# Patient Record
Sex: FEMALE | Race: BLACK OR AFRICAN AMERICAN | NOT HISPANIC OR LATINO | Employment: UNEMPLOYED | URBAN - METROPOLITAN AREA
[De-identification: names, ages, dates, MRNs, and addresses within clinical notes are randomized per-mention and may not be internally consistent; named-entity substitution may affect disease eponyms.]

---

## 2020-02-19 ENCOUNTER — HISTORICAL (OUTPATIENT)
Dept: ADMINISTRATIVE | Facility: HOSPITAL | Age: 25
End: 2020-02-19

## 2020-02-19 LAB
HAV IGM SERPL QL IA: NONREACTIVE
HBV CORE IGM SERPL QL IA: NONREACTIVE
HBV SURFACE AG SERPL QL IA: NONREACTIVE
HCV AB SERPL QL IA: NONREACTIVE
HIV 1+2 AB+HIV1 P24 AG SERPL QL IA: NONREACTIVE
RPR SER QL: REACTIVE
T PALLIDUM AB SER QL: REACTIVE

## 2022-01-03 ENCOUNTER — PATIENT OUTREACH (OUTPATIENT)
Dept: EMERGENCY MEDICINE | Facility: HOSPITAL | Age: 27
End: 2022-01-03

## 2022-04-10 ENCOUNTER — HISTORICAL (OUTPATIENT)
Dept: ADMINISTRATIVE | Facility: HOSPITAL | Age: 27
End: 2022-04-10
Payer: MEDICAID

## 2022-04-29 VITALS
HEIGHT: 64 IN | BODY MASS INDEX: 50.02 KG/M2 | DIASTOLIC BLOOD PRESSURE: 76 MMHG | SYSTOLIC BLOOD PRESSURE: 120 MMHG | OXYGEN SATURATION: 98 % | WEIGHT: 293 LBS
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
## 2022-05-11 ENCOUNTER — HOSPITAL ENCOUNTER (EMERGENCY)
Facility: HOSPITAL | Age: 27
Discharge: HOME | End: 2022-05-11
Attending: EMERGENCY MEDICINE
Payer: MEDICAID

## 2022-05-11 VITALS
TEMPERATURE: 99 F | WEIGHT: 293 LBS | HEART RATE: 94 BPM | RESPIRATION RATE: 20 BRPM | SYSTOLIC BLOOD PRESSURE: 116 MMHG | BODY MASS INDEX: 50.02 KG/M2 | DIASTOLIC BLOOD PRESSURE: 85 MMHG | OXYGEN SATURATION: 98 % | HEIGHT: 64 IN

## 2022-05-11 DIAGNOSIS — R10.84 GENERALIZED ABDOMINAL PAIN: Primary | ICD-10-CM

## 2022-05-11 DIAGNOSIS — R11.0 NAUSEA: ICD-10-CM

## 2022-05-11 DIAGNOSIS — N39.0 URINARY TRACT INFECTION WITHOUT HEMATURIA, SITE UNSPECIFIED: ICD-10-CM

## 2022-05-11 LAB
ALBUMIN SERPL-MCNC: 3.6 GM/DL (ref 3.5–5)
ALBUMIN/GLOB SERPL: 1 RATIO (ref 1.1–2)
ALP SERPL-CCNC: 85 UNIT/L (ref 40–150)
ALT SERPL-CCNC: 42 UNIT/L (ref 0–55)
APPEARANCE UR: CLEAR
AST SERPL-CCNC: 31 UNIT/L (ref 5–34)
B-HCG SERPL QL: NEGATIVE
BACTERIA #/AREA URNS AUTO: ABNORMAL /HPF
BASOPHILS # BLD AUTO: 0.02 X10(3)/MCL (ref 0–0.2)
BASOPHILS NFR BLD AUTO: 0.2 %
BILIRUB UR QL STRIP.AUTO: NEGATIVE MG/DL
BILIRUBIN DIRECT+TOT PNL SERPL-MCNC: 0.2 MG/DL (ref 0–0.5)
BILIRUBIN DIRECT+TOT PNL SERPL-MCNC: 0.3 MG/DL (ref 0–0.8)
BILIRUBIN DIRECT+TOT PNL SERPL-MCNC: 0.5 MG/DL
BUN SERPL-MCNC: 11.5 MG/DL (ref 7–18.7)
CALCIUM SERPL-MCNC: 9.6 MG/DL (ref 8.4–10.2)
CHLORIDE SERPL-SCNC: 105 MMOL/L (ref 98–107)
CO2 SERPL-SCNC: 27 MMOL/L (ref 22–29)
COLOR UR AUTO: YELLOW
CREAT SERPL-MCNC: 0.77 MG/DL (ref 0.55–1.02)
EOSINOPHIL # BLD AUTO: 0.13 X10(3)/MCL (ref 0–0.9)
EOSINOPHIL NFR BLD AUTO: 1.4 %
ERYTHROCYTE [DISTWIDTH] IN BLOOD BY AUTOMATED COUNT: 12.7 % (ref 11.5–17)
GLOBULIN SER-MCNC: 3.7 GM/DL (ref 2.4–3.5)
GLUCOSE SERPL-MCNC: 122 MG/DL (ref 74–100)
GLUCOSE UR QL STRIP.AUTO: NEGATIVE MG/DL
HCT VFR BLD AUTO: 44.2 % (ref 37–47)
HGB BLD-MCNC: 14.6 GM/DL (ref 12–16)
IMM GRANULOCYTES # BLD AUTO: 0.03 X10(3)/MCL (ref 0–0.02)
IMM GRANULOCYTES NFR BLD AUTO: 0.3 % (ref 0–0.43)
KETONES UR QL STRIP.AUTO: NEGATIVE MG/DL
LEUKOCYTE ESTERASE UR QL STRIP.AUTO: ABNORMAL UNIT/L
LIPASE SERPL-CCNC: 14 U/L
LYMPHOCYTES # BLD AUTO: 1.52 X10(3)/MCL (ref 0.6–4.6)
LYMPHOCYTES NFR BLD AUTO: 16.7 %
MCH RBC QN AUTO: 28.6 PG (ref 27–31)
MCHC RBC AUTO-ENTMCNC: 33 MG/DL (ref 33–36)
MCV RBC AUTO: 86.7 FL (ref 80–94)
MONOCYTES # BLD AUTO: 0.64 X10(3)/MCL (ref 0.1–1.3)
MONOCYTES NFR BLD AUTO: 7 %
NEUTROPHILS # BLD AUTO: 6.8 X10(3)/MCL (ref 2.1–9.2)
NEUTROPHILS NFR BLD AUTO: 74.4 %
NITRITE UR QL STRIP.AUTO: NEGATIVE
NRBC BLD AUTO-RTO: 0 %
PH UR STRIP.AUTO: 5.5 [PH]
PLATELET # BLD AUTO: 223 X10(3)/MCL (ref 130–400)
PMV BLD AUTO: 11.4 FL (ref 9.4–12.4)
POTASSIUM SERPL-SCNC: 4.3 MMOL/L (ref 3.5–5.1)
PROT SERPL-MCNC: 7.3 GM/DL (ref 6.4–8.3)
PROT UR QL STRIP.AUTO: NEGATIVE MG/DL
RBC # BLD AUTO: 5.1 X10(6)/MCL (ref 4.2–5.4)
RBC #/AREA URNS AUTO: ABNORMAL /HPF
RBC UR QL AUTO: NEGATIVE UNIT/L
SODIUM SERPL-SCNC: 142 MMOL/L (ref 136–145)
SP GR UR STRIP.AUTO: >=1.03
SQUAMOUS #/AREA URNS AUTO: ABNORMAL /LPF
UROBILINOGEN UR STRIP-ACNC: 0.2 MG/DL
WBC # SPEC AUTO: 9.1 X10(3)/MCL (ref 4.5–11.5)
WBC #/AREA URNS AUTO: ABNORMAL /HPF

## 2022-05-11 PROCEDURE — 96372 THER/PROPH/DIAG INJ SC/IM: CPT | Mod: 59 | Performed by: PHYSICIAN ASSISTANT

## 2022-05-11 PROCEDURE — 25000003 PHARM REV CODE 250: Performed by: PHYSICIAN ASSISTANT

## 2022-05-11 PROCEDURE — 81003 URINALYSIS AUTO W/O SCOPE: CPT | Performed by: PHYSICIAN ASSISTANT

## 2022-05-11 PROCEDURE — 85025 COMPLETE CBC W/AUTO DIFF WBC: CPT | Performed by: PHYSICIAN ASSISTANT

## 2022-05-11 PROCEDURE — 36415 COLL VENOUS BLD VENIPUNCTURE: CPT | Performed by: PHYSICIAN ASSISTANT

## 2022-05-11 PROCEDURE — 96361 HYDRATE IV INFUSION ADD-ON: CPT

## 2022-05-11 PROCEDURE — 81025 URINE PREGNANCY TEST: CPT | Performed by: PHYSICIAN ASSISTANT

## 2022-05-11 PROCEDURE — 81015 MICROSCOPIC EXAM OF URINE: CPT | Performed by: PHYSICIAN ASSISTANT

## 2022-05-11 PROCEDURE — 80053 COMPREHEN METABOLIC PANEL: CPT | Performed by: PHYSICIAN ASSISTANT

## 2022-05-11 PROCEDURE — 99284 EMERGENCY DEPT VISIT MOD MDM: CPT | Mod: 25

## 2022-05-11 PROCEDURE — 96374 THER/PROPH/DIAG INJ IV PUSH: CPT

## 2022-05-11 PROCEDURE — 63600175 PHARM REV CODE 636 W HCPCS: Performed by: PHYSICIAN ASSISTANT

## 2022-05-11 PROCEDURE — 83690 ASSAY OF LIPASE: CPT | Performed by: PHYSICIAN ASSISTANT

## 2022-05-11 RX ORDER — DICYCLOMINE HYDROCHLORIDE 20 MG/1
20 TABLET ORAL 4 TIMES DAILY
Qty: 20 TABLET | Refills: 0 | Status: SHIPPED | OUTPATIENT
Start: 2022-05-11 | End: 2022-05-16

## 2022-05-11 RX ORDER — FLUTICASONE PROPIONATE 50 MCG
SPRAY, SUSPENSION (ML) NASAL
COMMUNITY
Start: 2022-02-07 | End: 2022-06-30

## 2022-05-11 RX ORDER — DICYCLOMINE HYDROCHLORIDE 10 MG/ML
20 INJECTION INTRAMUSCULAR
Status: COMPLETED | OUTPATIENT
Start: 2022-05-11 | End: 2022-05-11

## 2022-05-11 RX ORDER — ONDANSETRON 2 MG/ML
4 INJECTION INTRAMUSCULAR; INTRAVENOUS
Status: COMPLETED | OUTPATIENT
Start: 2022-05-11 | End: 2022-05-11

## 2022-05-11 RX ORDER — NITROFURANTOIN 25; 75 MG/1; MG/1
100 CAPSULE ORAL 2 TIMES DAILY
Qty: 10 CAPSULE | Refills: 0 | Status: SHIPPED | OUTPATIENT
Start: 2022-05-11 | End: 2022-05-16

## 2022-05-11 RX ORDER — ONDANSETRON 4 MG/1
4 TABLET, ORALLY DISINTEGRATING ORAL EVERY 6 HOURS PRN
Qty: 20 TABLET | Refills: 0 | Status: SHIPPED | OUTPATIENT
Start: 2022-05-11 | End: 2022-05-16

## 2022-05-11 RX ORDER — CETIRIZINE HYDROCHLORIDE 10 MG/1
10 TABLET ORAL DAILY
COMMUNITY
Start: 2022-02-07 | End: 2022-06-30

## 2022-05-11 RX ADMIN — ONDANSETRON HYDROCHLORIDE 4 MG: 2 SOLUTION INTRAMUSCULAR; INTRAVENOUS at 12:05

## 2022-05-11 RX ADMIN — DICYCLOMINE HYDROCHLORIDE 20 MG: 20 INJECTION, SOLUTION INTRAMUSCULAR at 12:05

## 2022-05-11 RX ADMIN — SODIUM CHLORIDE 1000 ML: 9 INJECTION, SOLUTION INTRAVENOUS at 12:05

## 2022-05-11 NOTE — ED TRIAGE NOTES
Pt complains of weakness with diffuse abd pain. Onset this mor Nausea. Denies vomiting. One bout of diarrhea. Denies dysuria.

## 2022-05-11 NOTE — ED PROVIDER NOTES
Encounter Date: 5/11/2022       History     Chief Complaint   Patient presents with    Abdominal Pain    Weakness     Pt complains of weakness with diffuse abd pain. Onset this mor Nausea. Denies vomiting. One bout of diarrhea. Denies dysuria.      27 yo female presents to ED for evaluation of generalized abdominal pain and nausea since this morning. States feels like she needs to vomit but unable. Denies any urinary complaints or fever. Deneis any constipation or diarrhea. Hx of marijuana use.       Abdominal Pain  The current episode started just prior to arrival. The onset of the illness was abrupt. The abdominal pain is generalized. The abdominal pain does not radiate. The other symptoms of the illness include nausea. The other symptoms of the illness do not include fever, fatigue, shortness of breath, vomiting, diarrhea or dysuria.   Nausea began today.   The patient has not had a change in bowel habit. Additional symptoms associated with the illness include frequency. Symptoms associated with the illness do not include chills, diaphoresis, constipation, hematuria or back pain.     Review of patient's allergies indicates:  No Known Allergies  No past medical history on file.  No past surgical history on file.  No family history on file.  Social History     Tobacco Use    Smoking status: Former Smoker     Review of Systems   Constitutional: Negative for chills, diaphoresis, fatigue and fever.   Respiratory: Negative for cough and shortness of breath.    Cardiovascular: Negative for chest pain.   Gastrointestinal: Positive for abdominal pain and nausea. Negative for constipation, diarrhea and vomiting.   Genitourinary: Positive for frequency. Negative for dysuria and hematuria.   Musculoskeletal: Negative for back pain.   Neurological: Negative for dizziness, weakness, light-headedness and numbness.       Physical Exam     Initial Vitals [05/11/22 1111]   BP Pulse Resp Temp SpO2   (!) 141/93 (!) 115 18 97.7 °F  (36.5 °C) 97 %      MAP       --         Physical Exam    Nursing note and vitals reviewed.  Constitutional: She appears well-developed and well-nourished.   HENT:   Head: Normocephalic and atraumatic.   Eyes: EOM are normal. Pupils are equal, round, and reactive to light.   Neck: Neck supple.   Normal range of motion.  Cardiovascular: Normal rate, regular rhythm and normal heart sounds.   Pulmonary/Chest: Breath sounds normal. She has no wheezes. She has no rhonchi. She has no rales.   Abdominal: Abdomen is soft. Bowel sounds are normal. There is no abdominal tenderness. There is no rebound and no guarding.   Musculoskeletal:         General: Normal range of motion.      Cervical back: Normal range of motion and neck supple.     Neurological: She is alert and oriented to person, place, and time.   Skin: Skin is warm and dry.   Psychiatric: She has a normal mood and affect.         ED Course   Procedures  Labs Reviewed   COMPREHENSIVE METABOLIC PANEL - Abnormal; Notable for the following components:       Result Value    Glucose Level 122 (*)     Globulin 3.7 (*)     Albumin/Globulin Ratio 1.0 (*)     All other components within normal limits   URINALYSIS - Abnormal; Notable for the following components:    Leukocyte Esterase, UA Trace (*)     All other components within normal limits   CBC WITH DIFFERENTIAL - Abnormal; Notable for the following components:    IG# 0.03 (*)     All other components within normal limits   URINALYSIS, MICROSCOPIC - Abnormal; Notable for the following components:    Squamous Epithelial Cells, UA Many (*)     Bacteria, UA Moderate (*)     All other components within normal limits   LIPASE - Normal   PREGNANCY TEST, URINE RAPID - Normal   CBC W/ AUTO DIFFERENTIAL    Narrative:     The following orders were created for panel order CBC Auto Differential.  Procedure                               Abnormality         Status                     ---------                                -----------         ------                     CBC with Differential[[id number]]        Abnormal            Final result                 Please view results for these tests on the individual orders.          Imaging Results    None          Medications   sodium chloride 0.9% bolus 1,000 mL (1,000 mLs Intravenous New Bag 5/11/22 1245)   ondansetron injection 4 mg (4 mg Intravenous Given 5/11/22 1236)   dicyclomine injection 20 mg (20 mg Intramuscular Given 5/11/22 1238)     Medical Decision Making:   Differential Diagnosis:   Differential Diagnosis includes, but is not limited to:  AAA, aortic dissection, mesenteric ischemia, perforated viscous, MI/ACS, SBO/volvulus, incarcerated/strangulated hernia, intussusception, ileus, appendicitis, cholecystitis, cholangitis, diverticulitis, esophagitis, hepatitis, nephrolithiasis, pancreatitis, gastroenteritis, colitis, IBD/IBS, biliary colic, GERD, PUD, constipation, UTI/pyelonephritis,  disorder, cannabis hyperemesis.    ED Management:  Reassessed the pt.  The pt is resting comfortably and is NAD.  Pt states their sx have improved. Labs unremarkable. Abdomen, soft non tender and nonsurgical. Signs of UTI noted, will treat with antibiotics. Will give symptomatic care with zofran and bentyl. Discussed test results, shared treatment plan, specific conditions for return, and the need for f/u.  Answered their questions at this time.  Pt understands and agrees to the plan.  The pt has remained hemodynamically stable through ED course and is stable for discharge.                ED Course as of 05/11/22 1335   Wed May 11, 2022   1306 Bacteria, UA(!): Moderate  Patient with moderate bacteria and leukocytes in urine. Patient denies any dysuria or frequency. [SL]   1307 Patient reports pain and nausea better after zofran and bentyl. Awaiting completed lab work.  [SL]   1325 Labs unremarkable.  [SL]      ED Course User Index  [SL] ALIREZA Thomas             Clinical Impression:   Final  diagnoses:  [R10.84] Generalized abdominal pain (Primary)  [N39.0] Urinary tract infection without hematuria, site unspecified  [R11.0] Nausea          ED Disposition Condition    Discharge Stable        ED Prescriptions     Medication Sig Dispense Start Date End Date Auth. Provider    dicyclomine (BENTYL) 20 mg tablet Take 1 tablet (20 mg total) by mouth 4 (four) times daily. for 5 days 20 tablet 5/11/2022 5/16/2022 ALIREZA Thomas    ondansetron (ZOFRAN-ODT) 4 MG TbDL Take 1 tablet (4 mg total) by mouth every 6 (six) hours as needed (Nausea). 20 tablet 5/11/2022 5/16/2022 ALIREZA Thomas    nitrofurantoin, macrocrystal-monohydrate, (MACROBID) 100 MG capsule Take 1 capsule (100 mg total) by mouth 2 (two) times daily. for 5 days 10 capsule 5/11/2022 5/16/2022 ALIREZA Thomas        Follow-up Information    None          ALIREZA Thomas  05/11/22 1683

## 2022-06-02 ENCOUNTER — PATIENT OUTREACH (OUTPATIENT)
Dept: EMERGENCY MEDICINE | Facility: HOSPITAL | Age: 27
End: 2022-06-02
Payer: MEDICAID

## 2022-06-07 NOTE — PROGRESS NOTES
Patient did not return call 6/6/2022.  Attempted to reach patient 4 times without success.  Encounter closed.

## 2022-06-30 ENCOUNTER — HOSPITAL ENCOUNTER (OUTPATIENT)
Facility: HOSPITAL | Age: 27
Discharge: HOME | End: 2022-07-02
Attending: EMERGENCY MEDICINE | Admitting: STUDENT IN AN ORGANIZED HEALTH CARE EDUCATION/TRAINING PROGRAM
Payer: MEDICAID

## 2022-06-30 DIAGNOSIS — R07.9 CHEST PAIN: ICD-10-CM

## 2022-06-30 DIAGNOSIS — I47.10 SVT (SUPRAVENTRICULAR TACHYCARDIA): Primary | ICD-10-CM

## 2022-06-30 PROBLEM — E66.01 MORBID OBESITY: Status: ACTIVE | Noted: 2022-06-30

## 2022-06-30 PROBLEM — R03.0 ELEVATED BLOOD-PRESSURE READING, WITHOUT DIAGNOSIS OF HYPERTENSION: Status: ACTIVE | Noted: 2022-02-07

## 2022-06-30 PROBLEM — K64.4 HEMORRHOIDS, EXTERNAL: Status: ACTIVE | Noted: 2022-06-30

## 2022-06-30 PROBLEM — A53.9 SYPHILIS: Status: ACTIVE | Noted: 2022-06-30

## 2022-06-30 LAB
ALBUMIN SERPL-MCNC: 3.4 GM/DL (ref 3.5–5)
ALBUMIN/GLOB SERPL: 1 RATIO (ref 1.1–2)
ALP SERPL-CCNC: 91 UNIT/L (ref 40–150)
ALT SERPL-CCNC: 50 UNIT/L (ref 0–55)
AMPHET UR QL SCN: NEGATIVE
AST SERPL-CCNC: 33 UNIT/L (ref 5–34)
BARBITURATE SCN PRESENT UR: NEGATIVE
BASOPHILS # BLD AUTO: 0.04 X10(3)/MCL (ref 0–0.2)
BASOPHILS NFR BLD AUTO: 0.5 %
BENZODIAZ UR QL SCN: NEGATIVE
BILIRUBIN DIRECT+TOT PNL SERPL-MCNC: 0.3 MG/DL
BNP BLD-MCNC: 11.2 PG/ML
BUN SERPL-MCNC: 13 MG/DL (ref 7–18.7)
CALCIUM SERPL-MCNC: 10 MG/DL (ref 8.4–10.2)
CANNABINOIDS UR QL SCN: POSITIVE
CHLORIDE SERPL-SCNC: 107 MMOL/L (ref 98–107)
CO2 SERPL-SCNC: 24 MMOL/L (ref 22–29)
COCAINE UR QL SCN: NEGATIVE
CREAT SERPL-MCNC: 0.77 MG/DL (ref 0.55–1.02)
EOSINOPHIL # BLD AUTO: 0.12 X10(3)/MCL (ref 0–0.9)
EOSINOPHIL NFR BLD AUTO: 1.4 %
ERYTHROCYTE [DISTWIDTH] IN BLOOD BY AUTOMATED COUNT: 12.7 % (ref 11.5–17)
GLOBULIN SER-MCNC: 3.5 GM/DL (ref 2.4–3.5)
GLUCOSE SERPL-MCNC: 125 MG/DL (ref 74–100)
HCT VFR BLD AUTO: 42.9 % (ref 37–47)
HGB BLD-MCNC: 13.7 GM/DL (ref 12–16)
LYMPHOCYTES # BLD AUTO: 4.16 X10(3)/MCL (ref 0.6–4.6)
LYMPHOCYTES NFR BLD AUTO: 47.9 %
MCH RBC QN AUTO: 28.1 PG (ref 27–31)
MCHC RBC AUTO-ENTMCNC: 31.9 MG/DL (ref 33–36)
MCV RBC AUTO: 88.1 FL (ref 80–94)
MONOCYTES # BLD AUTO: 0.64 X10(3)/MCL (ref 0.1–1.3)
MONOCYTES NFR BLD AUTO: 7.4 %
NEUTROPHILS # BLD AUTO: 3.7 X10(3)/MCL (ref 2.1–9.2)
NEUTROPHILS NFR BLD AUTO: 42.6 %
OPIATES UR QL SCN: NEGATIVE
PCP UR QL: NEGATIVE
PH UR: 6 [PH] (ref 3–11)
PLATELET # BLD AUTO: 277 X10(3)/MCL (ref 130–400)
PMV BLD AUTO: 11.3 FL (ref 7.4–10.4)
POC CARDIAC TROPONIN I: 0 NG/ML
POC CARDIAC TROPONIN I: 0.16 NG/ML
POC CARDIAC TROPONIN I: 0.43 NG/ML
POTASSIUM SERPL-SCNC: 4.3 MMOL/L (ref 3.5–5.1)
PROT SERPL-MCNC: 6.9 GM/DL (ref 6.4–8.3)
RBC # BLD AUTO: 4.87 X10(6)/MCL (ref 4.2–5.4)
SAMPLE: ABNORMAL
SAMPLE: ABNORMAL
SAMPLE: NORMAL
SARS-COV-2 RDRP RESP QL NAA+PROBE: NEGATIVE
SODIUM SERPL-SCNC: 143 MMOL/L (ref 136–145)
SPECIFIC GRAVITY, URINE AUTO (.000) (OHS): >=1.03 (ref 1–1.03)
TROPONIN I SERPL-MCNC: 0.34 NG/ML (ref 0–0.04)
WBC # SPEC AUTO: 8.7 X10(3)/MCL (ref 4.5–11.5)

## 2022-06-30 PROCEDURE — G0378 HOSPITAL OBSERVATION PER HR: HCPCS

## 2022-06-30 PROCEDURE — 83880 ASSAY OF NATRIURETIC PEPTIDE: CPT | Performed by: EMERGENCY MEDICINE

## 2022-06-30 PROCEDURE — 99285 EMERGENCY DEPT VISIT HI MDM: CPT | Mod: 25

## 2022-06-30 PROCEDURE — 80307 DRUG TEST PRSMV CHEM ANLYZR: CPT | Performed by: STUDENT IN AN ORGANIZED HEALTH CARE EDUCATION/TRAINING PROGRAM

## 2022-06-30 PROCEDURE — 84484 ASSAY OF TROPONIN QUANT: CPT | Performed by: STUDENT IN AN ORGANIZED HEALTH CARE EDUCATION/TRAINING PROGRAM

## 2022-06-30 PROCEDURE — 36415 COLL VENOUS BLD VENIPUNCTURE: CPT | Performed by: EMERGENCY MEDICINE

## 2022-06-30 PROCEDURE — 93005 ELECTROCARDIOGRAM TRACING: CPT

## 2022-06-30 PROCEDURE — 25000003 PHARM REV CODE 250: Performed by: STUDENT IN AN ORGANIZED HEALTH CARE EDUCATION/TRAINING PROGRAM

## 2022-06-30 PROCEDURE — 87635 SARS-COV-2 COVID-19 AMP PRB: CPT | Performed by: EMERGENCY MEDICINE

## 2022-06-30 PROCEDURE — 96374 THER/PROPH/DIAG INJ IV PUSH: CPT

## 2022-06-30 PROCEDURE — 85025 COMPLETE CBC W/AUTO DIFF WBC: CPT | Performed by: EMERGENCY MEDICINE

## 2022-06-30 PROCEDURE — 63600175 PHARM REV CODE 636 W HCPCS

## 2022-06-30 PROCEDURE — 84484 ASSAY OF TROPONIN QUANT: CPT

## 2022-06-30 PROCEDURE — 25000003 PHARM REV CODE 250: Performed by: EMERGENCY MEDICINE

## 2022-06-30 PROCEDURE — 81025 URINE PREGNANCY TEST: CPT | Performed by: EMERGENCY MEDICINE

## 2022-06-30 PROCEDURE — 80053 COMPREHEN METABOLIC PANEL: CPT | Performed by: EMERGENCY MEDICINE

## 2022-06-30 PROCEDURE — 63600175 PHARM REV CODE 636 W HCPCS: Performed by: STUDENT IN AN ORGANIZED HEALTH CARE EDUCATION/TRAINING PROGRAM

## 2022-06-30 PROCEDURE — 96372 THER/PROPH/DIAG INJ SC/IM: CPT | Performed by: STUDENT IN AN ORGANIZED HEALTH CARE EDUCATION/TRAINING PROGRAM

## 2022-06-30 PROCEDURE — 36415 COLL VENOUS BLD VENIPUNCTURE: CPT | Performed by: STUDENT IN AN ORGANIZED HEALTH CARE EDUCATION/TRAINING PROGRAM

## 2022-06-30 RX ORDER — ADENOSINE 3 MG/ML
INJECTION INTRAVENOUS
Status: COMPLETED
Start: 2022-06-30 | End: 2022-06-30

## 2022-06-30 RX ORDER — TALC
9 POWDER (GRAM) TOPICAL NIGHTLY PRN
Status: DISCONTINUED | OUTPATIENT
Start: 2022-06-30 | End: 2022-07-02 | Stop reason: HOSPADM

## 2022-06-30 RX ORDER — ACETAMINOPHEN 325 MG/1
650 TABLET ORAL EVERY 8 HOURS PRN
Status: DISCONTINUED | OUTPATIENT
Start: 2022-06-30 | End: 2022-07-02 | Stop reason: HOSPADM

## 2022-06-30 RX ORDER — HYDROCODONE BITARTRATE AND ACETAMINOPHEN 5; 325 MG/1; MG/1
1 TABLET ORAL EVERY 6 HOURS PRN
Status: DISCONTINUED | OUTPATIENT
Start: 2022-06-30 | End: 2022-07-02 | Stop reason: HOSPADM

## 2022-06-30 RX ORDER — ENOXAPARIN SODIUM 100 MG/ML
40 INJECTION SUBCUTANEOUS EVERY 24 HOURS
Status: DISCONTINUED | OUTPATIENT
Start: 2022-06-30 | End: 2022-07-02 | Stop reason: HOSPADM

## 2022-06-30 RX ORDER — SODIUM CHLORIDE 0.9 % (FLUSH) 0.9 %
2 SYRINGE (ML) INJECTION EVERY 12 HOURS PRN
Status: DISCONTINUED | OUTPATIENT
Start: 2022-06-30 | End: 2022-07-02 | Stop reason: HOSPADM

## 2022-06-30 RX ORDER — ACETAMINOPHEN 325 MG/1
650 TABLET ORAL EVERY 4 HOURS PRN
Status: DISCONTINUED | OUTPATIENT
Start: 2022-06-30 | End: 2022-07-02 | Stop reason: HOSPADM

## 2022-06-30 RX ORDER — ASPIRIN 325 MG
325 TABLET ORAL
Status: COMPLETED | OUTPATIENT
Start: 2022-06-30 | End: 2022-06-30

## 2022-06-30 RX ORDER — METOPROLOL TARTRATE 25 MG/1
25 TABLET, FILM COATED ORAL 2 TIMES DAILY
Status: DISCONTINUED | OUTPATIENT
Start: 2022-06-30 | End: 2022-07-02 | Stop reason: HOSPADM

## 2022-06-30 RX ORDER — ONDANSETRON 2 MG/ML
4 INJECTION INTRAMUSCULAR; INTRAVENOUS EVERY 8 HOURS PRN
Status: DISCONTINUED | OUTPATIENT
Start: 2022-06-30 | End: 2022-07-02 | Stop reason: HOSPADM

## 2022-06-30 RX ORDER — LABETALOL HCL 20 MG/4 ML
10 SYRINGE (ML) INTRAVENOUS EVERY 4 HOURS PRN
Status: DISCONTINUED | OUTPATIENT
Start: 2022-06-30 | End: 2022-07-02 | Stop reason: HOSPADM

## 2022-06-30 RX ADMIN — ADENOSINE 6 MG: 3 INJECTION INTRAVENOUS at 08:06

## 2022-06-30 RX ADMIN — ASPIRIN 325 MG ORAL TABLET 325 MG: 325 PILL ORAL at 08:06

## 2022-06-30 RX ADMIN — METOPROLOL TARTRATE 25 MG: 25 TABLET, FILM COATED ORAL at 08:06

## 2022-06-30 RX ADMIN — ACETAMINOPHEN 650 MG: 325 TABLET ORAL at 08:06

## 2022-06-30 RX ADMIN — ENOXAPARIN SODIUM 40 MG: 100 INJECTION SUBCUTANEOUS at 05:06

## 2022-06-30 NOTE — ED NOTES
Faxed packet to [facility] called and spoke with Carmencita will talk to  And call back to let us know who will be taking consult

## 2022-06-30 NOTE — ASSESSMENT & PLAN NOTE
-resolved in the ED  -metoprolol 25 BID per cardio recs from ED  -Consult cardio in AM  -Pending echo

## 2022-06-30 NOTE — ED PROVIDER NOTES
Encounter Date: 6/30/2022       History     Chief Complaint   Patient presents with    Chest Pain     Was at work when chest pain started states had a panic attack chest pain ongoing     27 y/o began having chest pain an hour PTA while at work. She began to have a panic attack. EMS brought her in and her heart rate is 187. She denies smoking, drinking, street drugs, caffiene, and sinus meds. No prior episodes. Chest pain is anterior central 9/10. Ekg  Shows sinus tach at 187 with ST elevation in AVR and depression in I, II, AVF and V 4-6 suggestive of inferolateral subendocardial injury.        Review of patient's allergies indicates:  No Known Allergies  Past Medical History:   Diagnosis Date    Hypertension     Morbid obesity      History reviewed. No pertinent surgical history.  History reviewed. No pertinent family history.  Social History     Tobacco Use    Smoking status: Never Smoker    Smokeless tobacco: Never Used   Substance Use Topics    Alcohol use: Not Currently    Drug use: Never     Review of Systems   Constitutional: Negative for fever.   HENT: Negative for sore throat.    Respiratory: Negative for shortness of breath.    Cardiovascular: Negative for chest pain.   Gastrointestinal: Negative for nausea.   Genitourinary: Negative for dysuria.   Musculoskeletal: Negative for back pain.   Skin: Negative for rash.   Neurological: Negative for weakness.   Hematological: Does not bruise/bleed easily.       Physical Exam     Initial Vitals [06/30/22 0804]   BP Pulse Resp Temp SpO2   (!) 163/70 (!) 186 (!) 22 97.9 °F (36.6 °C) 99 %      MAP       --         Physical Exam    Nursing note and vitals reviewed.  Constitutional: She appears well-developed and well-nourished.   HENT:   Head: Normocephalic and atraumatic.   Eyes: Conjunctivae and EOM are normal. Pupils are equal, round, and reactive to light.   Neck: Neck supple.   Normal range of motion.  Cardiovascular: Regular rhythm, normal heart sounds  and intact distal pulses.   Tachycardia   Pulmonary/Chest: Breath sounds normal.   Abdominal: Abdomen is soft. Bowel sounds are normal.   Musculoskeletal:         General: Normal range of motion.      Cervical back: Normal range of motion and neck supple.     Neurological: She is alert and oriented to person, place, and time. She has normal strength.   Skin: Skin is warm and dry.   clammy   Psychiatric: She has a normal mood and affect. Her behavior is normal. Judgment and thought content normal.         ED Course   Procedures  Labs Reviewed   COMPREHENSIVE METABOLIC PANEL - Abnormal; Notable for the following components:       Result Value    Glucose Level 125 (*)     Albumin Level 3.4 (*)     Albumin/Globulin Ratio 1.0 (*)     All other components within normal limits   CBC WITH DIFFERENTIAL - Abnormal; Notable for the following components:    MCHC 31.9 (*)     MPV 11.3 (*)     All other components within normal limits   TROPONIN ISTAT - Abnormal; Notable for the following components:    POC Cardiac Troponin I 0.16 (*)     All other components within normal limits   B-TYPE NATRIURETIC PEPTIDE - Normal   TROPONIN ISTAT   CBC W/ AUTO DIFFERENTIAL    Narrative:     The following orders were created for panel order CBC auto differential.  Procedure                               Abnormality         Status                     ---------                               -----------         ------                     CBC with Differential[[id number]]        Abnormal            Final result                 Please view results for these tests on the individual orders.   PREGNANCY TEST, URINE RAPID   SARS-COV-2 RNA AMPLIFICATION, QUAL   POCT TROPONIN     EKG Readings: (Independently Interpreted)   Initial Reading: Ischemia. Rhythm: Sinus Tachycardia. Ectopy: No Ectopy. Conduction: Normal. ST Segment Elevation: AVR. ST Segment Depression: I, II, AVF, V4, V5 and V6. Clinical Impression: Supraventricular Tachycardia (SVT)   6/30/22  07:58   Other EKG Interpretations: EKG #2 (after adenosine 6 mg) 08:03  Rate 85, Normal sinus rhythm, Nonspecific ST abnormality       Imaging Results    None          Medications   adenosine (ADENOCARD) 3 mg/mL injection (6 mg Intravenous Given 6/30/22 0801)   aspirin tablet 325 mg (325 mg Oral Given 6/30/22 0824)                 ED Course as of 06/30/22 1154   Thu Jun 30, 2022 0812 The patient responded well to a single 6 mg push of adenocard and converted to sinus rhythm with resolution of her chest pain. [GA]   1034 Initial troponin was 0.00. 2 hours later 0.16. Will consult cardiology. [GA]   1144 I spoke with Dr Rubens Carson cardiology who reccomends metoprolol 25 mg BID and 24 hour observation with cardiology follow up as outpatient. [GA]      ED Course User Index  [GA] Chris Rosario MD             Clinical Impression:   Final diagnoses:  [R07.9] Chest pain  [I47.1] SVT (supraventricular tachycardia) (Primary)          ED Disposition Condition    Observation               Chris Rosario MD  06/30/22 5834

## 2022-06-30 NOTE — H&P
[facility] - Medical Surgical Unit  [facility] Medicine  History & Physical    Patient Name: Esthela Maddox  MRN: [id number]  Patient Class: OP- Observation  Admission Date: 6/30/2022  Attending Physician: No att. providers found   Primary Care Provider: Primary Doctor No         Patient information was obtained from patient and ER records.     Subjective:     Principal Problem:SVT (supraventricular tachycardia)    Chief Complaint:   Chief Complaint   Patient presents with    Chest Pain     Was at work when chest pain started states had a panic attack chest pain ongoing        HPI: 18-year-old female a past medical history as morbid obesity and marijuana use who presents with complaint of chest pain today.  Patient reports she was at work when all of a sudden had sudden-onset chest pain.  Patient reports it felt like she was having a panic attack however these symptoms were different and worse in severity.  She describes the pain as sternal, constant and did not relieved until she got medication in the ED.  A friend's to associated shortness of breath.  Denies cocaine use.  Heart rate in the ED was 187. Initial troponin was 0.00 and repeat was 0.16.  EKG done on presentation showed sinus tachycardia at a rate of 187 beats per minute with marked ST abnormalities repeat done once heart rate was well controlled showed normal sinus rhythm at 85 beats per minute with resolved ST depressions. Cardiology was consulted from the ED who recommended metoprolol 25 mg b.i.d. and 24 hour observation.  With outpatient follow-up.        Past Medical History:   Diagnosis Date    Hypertension     Morbid obesity        History reviewed. No pertinent surgical history.    Review of patient's allergies indicates:  No Known Allergies    No current facility-administered medications on file prior to encounter.     Current Outpatient Medications on File Prior to Encounter   Medication Sig    [DISCONTINUED] cetirizine (ZYRTEC) 10  MG tablet Take 10 mg by mouth once daily.    [DISCONTINUED] fluticasone propionate (FLONASE) 50 mcg/actuation nasal spray by Each Nostril route.     Family History    None       Tobacco Use    Smoking status: Never Smoker    Smokeless tobacco: Never Used   Substance and Sexual Activity    Alcohol use: Not Currently    Drug use: Never    Sexual activity: Yes     Review of Systems   Constitutional:  Negative for fatigue and fever.   HENT:  Negative for congestion, ear pain, sinus pain and sore throat.    Eyes:  Negative for pain, discharge and redness.   Respiratory:  Positive for shortness of breath. Negative for cough and wheezing.    Cardiovascular:  Positive for chest pain and palpitations. Negative for leg swelling.   Gastrointestinal:  Negative for abdominal pain, diarrhea, nausea and vomiting.   Endocrine: Negative for cold intolerance and heat intolerance.   Genitourinary:  Negative for dysuria, frequency and urgency.   Musculoskeletal:  Negative for back pain, joint swelling and neck pain.   Skin:  Negative for rash.   Neurological:  Negative for dizziness, weakness, numbness and headaches.   Hematological:  Does not bruise/bleed easily.   Objective:     Vital Signs (Most Recent):  Temp: 98.1 °F (36.7 °C) (06/30/22 1345)  Pulse: 93 (06/30/22 1345)  Resp: 18 (06/30/22 1345)  BP: 108/72 (06/30/22 1345)  SpO2: 98 % (06/30/22 1345) Vital Signs (24h Range):  Temp:  [97.9 °F (36.6 °C)-98.1 °F (36.7 °C)] 98.1 °F (36.7 °C)  Pulse:  [] 93  Resp:  [16-24] 18  SpO2:  [95 %-99 %] 98 %  BP: (101-163)/(62-87) 108/72     Weight: (!) 189 kg (416 lb 10.7 oz)  Body mass index is 71.49 kg/m².    Physical Exam  Constitutional:       General: She is not in acute distress.     Appearance: Normal appearance. She is morbidly obese.   HENT:      Mouth/Throat:      Mouth: Mucous membranes are moist.      Pharynx: Oropharynx is clear. No oropharyngeal exudate or posterior oropharyngeal erythema.   Eyes:      Extraocular  Movements: Extraocular movements intact.      Conjunctiva/sclera: Conjunctivae normal.      Pupils: Pupils are equal, round, and reactive to light.   Neck:      Thyroid: No thyromegaly.   Cardiovascular:      Rate and Rhythm: Normal rate and regular rhythm.      Heart sounds: No murmur heard.    No friction rub. No gallop.   Pulmonary:      Breath sounds: Normal breath sounds.   Abdominal:      General: Bowel sounds are normal. There is no distension.      Palpations: Abdomen is soft.      Tenderness: There is no abdominal tenderness.   Lymphadenopathy:      Cervical: No cervical adenopathy.   Skin:     General: Skin is warm.      Findings: No rash.   Neurological:      General: No focal deficit present.      Mental Status: She is oriented to person, place, and time.      Cranial Nerves: No cranial nerve deficit.   Psychiatric:         Mood and Affect: Mood is not anxious or depressed. Affect is not inappropriate.         CRANIAL NERVES     CN III, IV, VI   Pupils are equal, round, and reactive to light.     Significant Labs: All pertinent labs within the past 24 hours have been reviewed.    Significant Imaging: I have reviewed all pertinent imaging results/findings within the past 24 hours.    Assessment/Plan:     * SVT (supraventricular tachycardia)  -resolved in the ED  -metoprolol 25 BID per cardio recs from ED  -Consult cardio in AM  -Pending echo      Chest pain  -resolved, see above      Morbid obesity  Body mass index is 71.49 kg/m². Morbid obesity complicates all aspects of disease management from diagnostic modalities to treatment. Weight loss encouraged and health benefits explained to patient.     -dietary consult     Please admit observation status under my care  VTE Risk Mitigation (From admission, onward)         Ordered     enoxaparin injection 40 mg  Daily         06/30/22 1341     IP VTE HIGH RISK PATIENT  Once         06/30/22 1341                   Thairy G Reyes, DO  Department of Hospital  Medicine [facility] - Medical Surgical Unit

## 2022-06-30 NOTE — SUBJECTIVE & OBJECTIVE
Past Medical History:   Diagnosis Date    Hypertension     Morbid obesity        History reviewed. No pertinent surgical history.    Review of patient's allergies indicates:  No Known Allergies    No current facility-administered medications on file prior to encounter.     Current Outpatient Medications on File Prior to Encounter   Medication Sig    [DISCONTINUED] cetirizine (ZYRTEC) 10 MG tablet Take 10 mg by mouth once daily.    [DISCONTINUED] fluticasone propionate (FLONASE) 50 mcg/actuation nasal spray by Each Nostril route.     Family History    None       Tobacco Use    Smoking status: Never Smoker    Smokeless tobacco: Never Used   Substance and Sexual Activity    Alcohol use: Not Currently    Drug use: Never    Sexual activity: Yes     Review of Systems   Constitutional:  Negative for fatigue and fever.   HENT:  Negative for congestion, ear pain, sinus pain and sore throat.    Eyes:  Negative for pain, discharge and redness.   Respiratory:  Positive for shortness of breath. Negative for cough and wheezing.    Cardiovascular:  Positive for chest pain and palpitations. Negative for leg swelling.   Gastrointestinal:  Negative for abdominal pain, diarrhea, nausea and vomiting.   Endocrine: Negative for cold intolerance and heat intolerance.   Genitourinary:  Negative for dysuria, frequency and urgency.   Musculoskeletal:  Negative for back pain, joint swelling and neck pain.   Skin:  Negative for rash.   Neurological:  Negative for dizziness, weakness, numbness and headaches.   Hematological:  Does not bruise/bleed easily.   Objective:     Vital Signs (Most Recent):  Temp: 98.1 °F (36.7 °C) (06/30/22 1345)  Pulse: 93 (06/30/22 1345)  Resp: 18 (06/30/22 1345)  BP: 108/72 (06/30/22 1345)  SpO2: 98 % (06/30/22 1345) Vital Signs (24h Range):  Temp:  [97.9 °F (36.6 °C)-98.1 °F (36.7 °C)] 98.1 °F (36.7 °C)  Pulse:  [] 93  Resp:  [16-24] 18  SpO2:  [95 %-99 %] 98 %  BP: (101-163)/(62-87) 108/72     Weight: (!)  189 kg (416 lb 10.7 oz)  Body mass index is 71.49 kg/m².    Physical Exam  Constitutional:       General: She is not in acute distress.     Appearance: Normal appearance. She is morbidly obese.   HENT:      Mouth/Throat:      Mouth: Mucous membranes are moist.      Pharynx: Oropharynx is clear. No oropharyngeal exudate or posterior oropharyngeal erythema.   Eyes:      Extraocular Movements: Extraocular movements intact.      Conjunctiva/sclera: Conjunctivae normal.      Pupils: Pupils are equal, round, and reactive to light.   Neck:      Thyroid: No thyromegaly.   Cardiovascular:      Rate and Rhythm: Normal rate and regular rhythm.      Heart sounds: No murmur heard.    No friction rub. No gallop.   Pulmonary:      Breath sounds: Normal breath sounds.   Abdominal:      General: Bowel sounds are normal. There is no distension.      Palpations: Abdomen is soft.      Tenderness: There is no abdominal tenderness.   Lymphadenopathy:      Cervical: No cervical adenopathy.   Skin:     General: Skin is warm.      Findings: No rash.   Neurological:      General: No focal deficit present.      Mental Status: She is oriented to person, place, and time.      Cranial Nerves: No cranial nerve deficit.   Psychiatric:         Mood and Affect: Mood is not anxious or depressed. Affect is not inappropriate.         CRANIAL NERVES     CN III, IV, VI   Pupils are equal, round, and reactive to light.     Significant Labs: All pertinent labs within the past 24 hours have been reviewed.    Significant Imaging: I have reviewed all pertinent imaging results/findings within the past 24 hours.

## 2022-06-30 NOTE — LETTER
July 1, 2022         [address] [place] [address]  [place] [address]  Phone: [phone]       Patient: Esthela Maddox   YOB: 1995  Date of Visit: 07/01/2022    To Whom It May Concern:    Satish Maddox  was at [facility] from 6/29/2022- 7/2/2022. The patient may return to work/school on 7/2/2022. If you have any questions or concerns, or if I can be of further assistance, please do not hesitate to contact me.    Sincerely,          Thairy G Reyes, DO

## 2022-06-30 NOTE — LETTER
July 1, 2022         [address] [place] [address]  [place] [address]  Phone: [phone]       Patient: Esthela Maddox   YOB: 1995  Date of Visit: 07/01/2022    To Whom It May Concern:    Satish Maddox  was at [facility] on 07/01/2022. The patient may return to work/school on *** {With/no:47409} restrictions. If you have any questions or concerns, or if I can be of further assistance, please do not hesitate to contact me.    Sincerely,    Rubio Tam RN

## 2022-06-30 NOTE — ASSESSMENT & PLAN NOTE
Body mass index is 71.49 kg/m². Morbid obesity complicates all aspects of disease management from diagnostic modalities to treatment. Weight loss encouraged and health benefits explained to patient.     -dietary consult

## 2022-06-30 NOTE — PLAN OF CARE
[facility] - Medical Surgical Unit  Initial Discharge Assessment       Primary Care Provider: Primary Doctor No    Admission Diagnosis: SVT (supraventricular tachycardia) [I47.1]  Chest pain [R07.9]    Admission Date: 6/30/2022  Expected Discharge Date:     Discharge Barriers Identified: None    Payor: MEDICAID / Plan: [place] [employer] / Product Type: Managed Medicaid /     Extended Emergency Contact Information  Primary Emergency Contact: Nevin Campbell  Mobile Phone: [phone]  Relation: Friend  Preferred language: English   needed? No    Discharge Plan A: Home with family, Home Health         [id number] - [place], [place] - [address] AT [address]  [address]  [facility] [address]  Phone: [phone] Fax: [phone]      Initial Assessment (most recent)     Adult Discharge Assessment - 06/30/22 1803        Discharge Assessment    Assessment Type Discharge Planning Assessment     Confirmed/corrected address, phone number and insurance Yes     Confirmed Demographics Correct on Facesheet     Source of Information family     If unable to respond/provide information was family/caregiver contacted? Yes     Contact Name/Number Nevin Randle friend      Does patient/caregiver understand observation status Yes     Communicated NOEMI with patient/caregiver Date not available/Unable to determine     Reason For Admission Chest Pain     Lives With alone     Facility Arrived From: Home     Do you expect to return to your current living situation? Yes     Do you have help at home or someone to help you manage your care at home? Yes     Who are your caregiver(s) and their phone number(s)? Nevin Randle friend      Prior to hospitilization cognitive status: Alert/Oriented     Current cognitive status: Alert/Oriented     Walking or Climbing Stairs Difficulty none     Dressing/Bathing Difficulty none     Home Accessibility  wheelchair accessible     Home Layout Able to live on 1st floor     Equipment Currently Used at Home none     Readmission within 30 days? No     Patient currently being followed by outpatient case management? No     Do you currently have service(s) that help you manage your care at home? No     Do you take prescription medications? Yes     Do you have prescription coverage? Yes     Do you have any problems affording any of your prescribed medications? TBD     Is the patient taking medications as prescribed? yes     Who is going to help you get home at discharge? Nevin Randle friend      How do you get to doctors appointments? family or friend will provide     Are you on dialysis? No     Do you take coumadin? No     Discharge Plan A Home with family;Home Health     DME Needed Upon Discharge  none     Discharge Plan discussed with: Friend     Name(s) and Number(s) Nevin Randle friend      Discharge Barriers Identified None

## 2022-06-30 NOTE — ED NOTES
Patient reports she was at work, started with a panic attack, and started having chest pain. Denies drug use, caffeine intake, or medication use. Reports she has never had an episode like this.

## 2022-06-30 NOTE — HPI
18-year-old female a past medical history as morbid obesity and marijuana use who presents with complaint of chest pain today.  Patient reports she was at work when all of a sudden had sudden-onset chest pain.  Patient reports it felt like she was having a panic attack however these symptoms were different and worse in severity.  She describes the pain as sternal, constant and did not relieved until she got medication in the ED.  A friend's to associated shortness of breath.  Denies cocaine use.  Heart rate in the ED was 187. Initial troponin was 0.00 and repeat was 0.16.  EKG done on presentation showed sinus tachycardia at a rate of 187 beats per minute with marked ST abnormalities repeat done once heart rate was well controlled showed normal sinus rhythm at 85 beats per minute with resolved ST depressions. Cardiology was consulted from the ED who recommended metoprolol 25 mg b.i.d. and 24 hour observation.  With outpatient follow-up.

## 2022-06-30 NOTE — LETTER
July 2, 2022         [address] [place] [address]  [place] [address]  Phone: [phone]       Patient: Esthela Maddox   YOB: 1995  Date of Visit: 07/02/2022    To Whom It May Concern:    Satish Maddox  was at [facility] on 07/02/2022. The patient may return to work/school on *** {With/no:32667} restrictions. If you have any questions or concerns, or if I can be of further assistance, please do not hesitate to contact me.    Sincerely,    Angela Pinto LPN

## 2022-07-01 LAB
ANION GAP SERPL CALC-SCNC: 7 MEQ/L
B-HCG SERPL QL: NEGATIVE
BASOPHILS # BLD AUTO: 0.03 X10(3)/MCL (ref 0–0.2)
BASOPHILS NFR BLD AUTO: 0.4 %
BUN SERPL-MCNC: 10.3 MG/DL (ref 7–18.7)
CALCIUM SERPL-MCNC: 9.5 MG/DL (ref 8.4–10.2)
CHLORIDE SERPL-SCNC: 107 MMOL/L (ref 98–107)
CHOLEST SERPL-MCNC: 161 MG/DL
CHOLEST/HDLC SERPL: 4 {RATIO} (ref 0–5)
CO2 SERPL-SCNC: 26 MMOL/L (ref 22–29)
CREAT SERPL-MCNC: 0.68 MG/DL (ref 0.55–1.02)
CREAT/UREA NIT SERPL: 15
EOSINOPHIL # BLD AUTO: 0.14 X10(3)/MCL (ref 0–0.9)
EOSINOPHIL NFR BLD AUTO: 2 %
ERYTHROCYTE [DISTWIDTH] IN BLOOD BY AUTOMATED COUNT: 13 % (ref 11.5–17)
EST. AVERAGE GLUCOSE BLD GHB EST-MCNC: 119.8 MG/DL
GLUCOSE SERPL-MCNC: 113 MG/DL (ref 74–100)
HBA1C MFR BLD: 5.8 %
HCT VFR BLD AUTO: 39.7 % (ref 37–47)
HDLC SERPL-MCNC: 41 MG/DL (ref 35–60)
HGB BLD-MCNC: 12.5 GM/DL (ref 12–16)
IMM GRANULOCYTES # BLD AUTO: 0.01 X10(3)/MCL (ref 0–0.04)
IMM GRANULOCYTES NFR BLD AUTO: 0.1 %
LDLC SERPL CALC-MCNC: 95 MG/DL (ref 50–140)
LYMPHOCYTES # BLD AUTO: 3.27 X10(3)/MCL (ref 0.6–4.6)
LYMPHOCYTES NFR BLD AUTO: 46.6 %
MAGNESIUM SERPL-MCNC: 2.1 MG/DL (ref 1.6–2.6)
MCH RBC QN AUTO: 28.2 PG (ref 27–31)
MCHC RBC AUTO-ENTMCNC: 31.5 MG/DL (ref 33–36)
MCV RBC AUTO: 89.6 FL (ref 80–94)
MONOCYTES # BLD AUTO: 0.62 X10(3)/MCL (ref 0.1–1.3)
MONOCYTES NFR BLD AUTO: 8.8 %
NEUTROPHILS # BLD AUTO: 2.9 X10(3)/MCL (ref 2.1–9.2)
NEUTROPHILS NFR BLD AUTO: 42.1 %
PLATELET # BLD AUTO: 263 X10(3)/MCL (ref 130–400)
PMV BLD AUTO: 11.3 FL (ref 7.4–10.4)
POTASSIUM SERPL-SCNC: 3.6 MMOL/L (ref 3.5–5.1)
RBC # BLD AUTO: 4.43 X10(6)/MCL (ref 4.2–5.4)
SODIUM SERPL-SCNC: 140 MMOL/L (ref 136–145)
TRIGL SERPL-MCNC: 123 MG/DL (ref 37–140)
TSH SERPL-ACNC: 0.77 UIU/ML (ref 0.35–4.94)
VLDLC SERPL CALC-MCNC: 25 MG/DL
WBC # SPEC AUTO: 7 X10(3)/MCL (ref 4.5–11.5)

## 2022-07-01 PROCEDURE — 36415 COLL VENOUS BLD VENIPUNCTURE: CPT | Performed by: STUDENT IN AN ORGANIZED HEALTH CARE EDUCATION/TRAINING PROGRAM

## 2022-07-01 PROCEDURE — 96372 THER/PROPH/DIAG INJ SC/IM: CPT | Performed by: STUDENT IN AN ORGANIZED HEALTH CARE EDUCATION/TRAINING PROGRAM

## 2022-07-01 PROCEDURE — 25000003 PHARM REV CODE 250: Performed by: STUDENT IN AN ORGANIZED HEALTH CARE EDUCATION/TRAINING PROGRAM

## 2022-07-01 PROCEDURE — 63600175 PHARM REV CODE 636 W HCPCS: Performed by: STUDENT IN AN ORGANIZED HEALTH CARE EDUCATION/TRAINING PROGRAM

## 2022-07-01 PROCEDURE — G0378 HOSPITAL OBSERVATION PER HR: HCPCS

## 2022-07-01 PROCEDURE — 80048 BASIC METABOLIC PNL TOTAL CA: CPT | Performed by: STUDENT IN AN ORGANIZED HEALTH CARE EDUCATION/TRAINING PROGRAM

## 2022-07-01 PROCEDURE — 36415 COLL VENOUS BLD VENIPUNCTURE: CPT | Performed by: NURSE PRACTITIONER

## 2022-07-01 PROCEDURE — 83036 HEMOGLOBIN GLYCOSYLATED A1C: CPT | Performed by: STUDENT IN AN ORGANIZED HEALTH CARE EDUCATION/TRAINING PROGRAM

## 2022-07-01 PROCEDURE — 83735 ASSAY OF MAGNESIUM: CPT | Performed by: NURSE PRACTITIONER

## 2022-07-01 PROCEDURE — 84443 ASSAY THYROID STIM HORMONE: CPT | Performed by: NURSE PRACTITIONER

## 2022-07-01 PROCEDURE — 85025 COMPLETE CBC W/AUTO DIFF WBC: CPT | Performed by: STUDENT IN AN ORGANIZED HEALTH CARE EDUCATION/TRAINING PROGRAM

## 2022-07-01 PROCEDURE — 80061 LIPID PANEL: CPT | Performed by: STUDENT IN AN ORGANIZED HEALTH CARE EDUCATION/TRAINING PROGRAM

## 2022-07-01 PROCEDURE — 25000003 PHARM REV CODE 250: Performed by: NURSE PRACTITIONER

## 2022-07-01 RX ORDER — METOPROLOL TARTRATE 25 MG/1
25 TABLET, FILM COATED ORAL 2 TIMES DAILY
Qty: 60 TABLET | Refills: 11 | Status: SHIPPED | OUTPATIENT
Start: 2022-07-01 | End: 2022-08-01 | Stop reason: SDUPTHER

## 2022-07-01 RX ORDER — POTASSIUM CHLORIDE 20 MEQ/1
40 TABLET, EXTENDED RELEASE ORAL ONCE
Status: DISCONTINUED | OUTPATIENT
Start: 2022-07-01 | End: 2022-07-01

## 2022-07-01 RX ORDER — ASPIRIN 81 MG/1
81 TABLET ORAL DAILY
Status: DISCONTINUED | OUTPATIENT
Start: 2022-07-01 | End: 2022-07-02 | Stop reason: HOSPADM

## 2022-07-01 RX ORDER — ASPIRIN 81 MG/1
81 TABLET ORAL DAILY
Qty: 30 TABLET | Refills: 3 | Status: SHIPPED | OUTPATIENT
Start: 2022-07-02 | End: 2023-02-02 | Stop reason: SDUPTHER

## 2022-07-01 RX ADMIN — ENOXAPARIN SODIUM 40 MG: 100 INJECTION SUBCUTANEOUS at 04:07

## 2022-07-01 RX ADMIN — ASPIRIN 81 MG: 81 TABLET, COATED ORAL at 10:07

## 2022-07-01 RX ADMIN — POTASSIUM BICARBONATE 25 MEQ: 977.5 TABLET, EFFERVESCENT ORAL at 09:07

## 2022-07-01 RX ADMIN — POTASSIUM BICARBONATE 25 MEQ: 977.5 TABLET, EFFERVESCENT ORAL at 02:07

## 2022-07-01 RX ADMIN — METOPROLOL TARTRATE 25 MG: 25 TABLET, FILM COATED ORAL at 09:07

## 2022-07-01 RX ADMIN — METOPROLOL TARTRATE 25 MG: 25 TABLET, FILM COATED ORAL at 08:07

## 2022-07-01 NOTE — HOSPITAL COURSE
In supraventricular tachycardia broke in the ED after 6 mg IV push of adenosine, she has maintained heart rate in the 80s with metoprolol tartrate 25 p.o. b.i.d., no further recurrence.  Troponin did uptrend to a peak of 0.4 that has now started to decrease to 0.345.  A1c 5.8, TSH 0.76, lipid panel all within normal limits-->assessed for risk stratification. Echocardiogram Echocardiogram performed for further risk stratification, preliminary read is showing normal systolic function, preserved ejection fraction and normal diastolic function.  Aortic valve appears structurally normal, pulmonic valve also structurally normal.  Pericardium is normal and no pericardial effusion.  We have already scheduled patient her follow-up appointments with cardiology for preventive monitor and stress test.  Patient has been educated that she is to adhere to those follow-up appointments. In the meantime continue with aspirin, metoprolol.

## 2022-07-01 NOTE — PLAN OF CARE
Problem: Adult Inpatient Plan of Care  Goal: Plan of Care Review  Outcome: Ongoing, Progressing  Goal: Patient-Specific Goal (Individualized)  Outcome: Ongoing, Progressing  Goal: Absence of Hospital-Acquired Illness or Injury  Outcome: Ongoing, Progressing  Intervention: Identify and Manage Fall Risk  Flowsheets (Taken 7/1/2022 1721)  Safety Promotion/Fall Prevention:   assistive device/personal item within reach   bed alarm set   instructed to call staff for mobility   nonskid shoes/socks when out of bed  Intervention: Prevent and Manage VTE (Venous Thromboembolism) Risk  Flowsheets (Taken 7/1/2022 1721)  Activity Management: Ambulated to bathroom - L4  VTE Prevention/Management: bleeding risk assessed  Intervention: Prevent Infection  Flowsheets (Taken 7/1/2022 1721)  Infection Prevention:   hand hygiene promoted   personal protective equipment utilized  Goal: Optimal Comfort and Wellbeing  Outcome: Ongoing, Progressing  Intervention: Monitor Pain and Promote Comfort  Flowsheets (Taken 7/1/2022 1721)  Pain Management Interventions: quiet environment facilitated  Intervention: Provide Person-Centered Care  Flowsheets (Taken 7/1/2022 1721)  Trust Relationship/Rapport:   care explained   choices provided   questions answered   thoughts/feelings acknowledged   reassurance provided   questions encouraged  Goal: Readiness for Transition of Care  Outcome: Ongoing, Progressing     Problem: Bariatric Environmental Safety  Goal: Safety Maintained with Care  Outcome: Ongoing, Progressing     Problem: Fall Injury Risk  Goal: Absence of Fall and Fall-Related Injury  Outcome: Ongoing, Progressing  Intervention: Identify and Manage Contributors  Flowsheets (Taken 7/1/2022 1721)  Self-Care Promotion: independence encouraged  Medication Review/Management: medications reviewed     Problem: Cardiac Output Decreased  Goal: Effective Cardiac Output  Outcome: Ongoing, Progressing  Intervention: Optimize Cardiac Output  Flowsheets  (Taken 7/1/2022 1721)  VTE Prevention/Management: bleeding risk assessed  Environmental Support: calm environment promoted

## 2022-07-01 NOTE — CONSULTS
" [facility]  Adult Nutrition  Consult Note    SUMMARY     Recommendations    Recommendation/Intervention: 1. continue heart healthy diet  Goals: Consumed more fruits and vegetables, whole grains with meal. Wt loss 1-2# per week.  Nutrition Goal Status: new    Assessment and Plan    No new Assessment & Plan notes have been filed under this hospital service since the last note was generated.  Service: Nutrition       Malnutrition Assessment                                       Reason for Assessment    Reason For Assessment: consult  Diagnosis: other (see comments) (Noted   SVT (supraventricular tachycardia) 6/30/2022    Morbid obesity 6/30/2022    Chest pain)  Relevant Medical History: HTN, morbid obesity  General Information Comments: Pt intake is good. Pt felt hungry during evening. Consult for wt loss and calorie count. Educated patient on heart healthy diet, choosing healthier food options, limiting fast food, calorie counting ([employer] pal) as journal, caloric needs to lose weight. Verbalized understanding. Pt reports does not drink soda. Sprite at bedside table. Expect fair compliance. Pt did set up my fitness pal on her phone. Educational materials in discharged folder.    Nutrition Risk Screen    Nutrition Risk Screen: no indicators present    Nutrition/Diet History         Anthropometrics    Temp: 97.6 °F (36.4 °C)  Height Method: Stated  Height: 5' 4.02" (162.6 cm)  Height (inches): 64.02 in  Weight Method: Bed Scale  Weight: (!) 189 kg (416 lb 10.7 oz)  Weight (lb): (!) 416.67 lb  Ideal Body Weight (IBW), Female: 120.1 lb  % Ideal Body Weight, Female (lb): 346.94 %  BMI (Calculated): 71.5  BMI Grade: greater than 40 - morbid obesity       Lab/Procedures/Meds    Pertinent Labs Reviewed: reviewed  Pertinent Labs Comments: 7/1/22: Glucose 113 H  Pertinent Medications Reviewed: reviewed    Physical Findings/Assessment         Estimated/Assessed Needs    Weight Used For Calorie " Calculations: (!) 189 kg (416 lb 10.7 oz)  Energy Calorie Requirements (kcal): 2105.32 (MSJ X 1.0 stress factor,-500 kcal )  Energy Need Method: [place]  Protein Requirements: 151.52 (0.8 gm/kg/CBW)  Weight Used For Protein Calculations: (!) 189 kg (416 lb 10.7 oz)     Estimated Fluid Requirement Method: RDA Method  RDA Method (mL): 2105.32         Nutrition Prescription Ordered    Current Diet Order: heart healthy    Evaluation of Received Nutrient/Fluid Intake    Tolerance: tolerating      Nutrition Risk    Level of Risk/Frequency of Follow-up: low       Monitor and Evaluation    Food and Nutrient Intake: food and beverage intake  Food and Nutrient Adminstration: diet order  Knowledge/Beliefs/Attitudes: food and nutrition knowledge/skill  Anthropometric Measurements: weight  Biochemical Data, Medical Tests and Procedures: electrolyte and renal panel       Nutrition Follow-Up    RD Follow-up?: Yes

## 2022-07-01 NOTE — ASSESSMENT & PLAN NOTE
Body mass index is 71.48 kg/m². Morbid obesity complicates all aspects of disease management from diagnostic modalities to treatment. Weight loss encouraged and health benefits explained to patient.     -dietary consult

## 2022-07-01 NOTE — PLAN OF CARE
Problem: Adult Inpatient Plan of Care  Goal: Plan of Care Review  7/1/2022 0455 by Nancie Corral RN  Outcome: Ongoing, Progressing  Flowsheets (Taken 7/1/2022 0455)  Plan of Care Reviewed With: patient  7/1/2022 0452 by Nancie Corral RN  Outcome: Ongoing, Progressing  Flowsheets (Taken 7/1/2022 0452)  Plan of Care Reviewed With: patient  Goal: Absence of Hospital-Acquired Illness or Injury  7/1/2022 0455 by Nancie Corral RN  Outcome: Ongoing, Progressing  7/1/2022 0452 by Nancie Corral RN  Outcome: Ongoing, Progressing  Intervention: Identify and Manage Fall Risk  7/1/2022 0455 by LEANA Bravoheets (Taken 7/1/2022 0455)  Safety Promotion/Fall Prevention:   assistive device/personal item within reach   bed alarm set   nonskid shoes/socks when out of bed   Fall Risk reviewed with patient/family   side rails raised x 3   supervised activity   Supervised toileting - stay within arms reach   instructed to call staff for mobility  7/1/2022 0452 by Nancie Corral RN  Flowsheets (Taken 7/1/2022 0452)  Safety Promotion/Fall Prevention:   assistive device/personal item within reach   bed alarm set   Fall Risk reviewed with patient/family   nonskid shoes/socks when out of bed   side rails raised x 3   supervised activity   instructed to call staff for mobility   Supervised toileting - stay within arms reach  Intervention: Prevent Skin Injury  7/1/2022 0455 by LEANA Bravoheets (Taken 7/1/2022 0455)  Body Position: position changed independently  7/1/2022 0452 by LEANA Bravo (Taken 7/1/2022 0452)  Body Position: position changed independently  Intervention: Prevent and Manage VTE (Venous Thromboembolism) Risk  7/1/2022 0455 by LEANA Bravoheets (Taken 7/1/2022 0455)  Activity Management: Ambulated -L4  VTE Prevention/Management: ambulation promoted  7/1/2022 0452 by LEANA Bravo (Taken 7/1/2022 0452)  Activity Management: Ambulated -L4  VTE  Prevention/Management: ambulation promoted  Intervention: Prevent Infection  7/1/2022 0455 by Nancie Corral RN  Flowsheets (Taken 7/1/2022 0455)  Infection Prevention: hand hygiene promoted  7/1/2022 0452 by Nancie Corral RN  Flowsheets (Taken 7/1/2022 0452)  Infection Prevention: hand hygiene promoted

## 2022-07-01 NOTE — PROGRESS NOTES
[facility]  [facility] Medicine  Progress Note    Patient Name: Esthela Maddox  MRN: [id number]  Patient Class: OP- Observation   Admission Date: 6/30/2022  Length of Stay: 0 days  Attending Physician: No att. providers found  Primary Care Provider: Primary Doctor No        Subjective:     Principal Problem:SVT (supraventricular tachycardia)        HPI:  18-year-old female a past medical history as morbid obesity and marijuana use who presents with complaint of chest pain today.  Patient reports she was at work when all of a sudden had sudden-onset chest pain.  Patient reports it felt like she was having a panic attack however these symptoms were different and worse in severity.  She describes the pain as sternal, constant and did not relieved until she got medication in the ED.  A friend's to associated shortness of breath.  Denies cocaine use.  Heart rate in the ED was 187. Initial troponin was 0.00 and repeat was 0.16.  EKG done on presentation showed sinus tachycardia at a rate of 187 beats per minute with marked ST abnormalities repeat done once heart rate was well controlled showed normal sinus rhythm at 85 beats per minute with resolved ST depressions. Cardiology was consulted from the ED who recommended metoprolol 25 mg b.i.d. and 24 hour observation.  With outpatient follow-up.        Overview/Hospital Course:  In supraventricular tachycardia broke in the ED after 6 mg IV push of adenosine, she has maintained heart rate in the 80s with metoprolol tartrate 25 p.o. b.i.d., no further recurrence.  Troponin did uptrend to a peak of 0.4 that has now started to decrease to 0.345.  A1c 5.8, TSH 0.76, lipid panel all within normal limits an order for risk stratification.  Pending echocardiogram for further recommendations regarding ischemic workup.  In the meantime continue with aspirin, metoprolol.      Interval History:  Seen and evaluated lying comfortably in bed.  Eyes any  further chest pain or palpitations    Review of Systems   Constitutional:  Negative for fatigue and fever.   HENT:  Negative for congestion, sore throat and tinnitus.    Respiratory:  Negative for chest tightness, shortness of breath and wheezing.    Cardiovascular:  Negative for chest pain and leg swelling.   Gastrointestinal:  Negative for diarrhea and rectal pain.   Endocrine: Negative for cold intolerance and heat intolerance.   Genitourinary:  Negative for dysuria and urgency.   Musculoskeletal:  Negative for back pain.   Skin:  Negative for wound.   Neurological:  Negative for dizziness, syncope and weakness.   Hematological:  Does not bruise/bleed easily.   Psychiatric/Behavioral:  Negative for agitation. The patient is not nervous/anxious.    Objective:     Vital Signs (Most Recent):  Temp: 97.8 °F (36.6 °C) (07/01/22 1136)  Pulse: 72 (07/01/22 1136)  Resp: 20 (07/01/22 0345)  BP: 116/68 (07/01/22 1136)  SpO2: 98 % (07/01/22 1136) Vital Signs (24h Range):  Temp:  [97.6 °F (36.4 °C)-98.4 °F (36.9 °C)] 97.8 °F (36.6 °C)  Pulse:  [72-98] 72  Resp:  [18-20] 20  SpO2:  [96 %-98 %] 98 %  BP: (116-158)/(68-95) 116/68     Weight: (!) 189 kg (416 lb 10.7 oz)  Body mass index is 71.48 kg/m².    Intake/Output Summary (Last 24 hours) at 7/1/2022 1415  Last data filed at 7/1/2022 1200  Gross per 24 hour   Intake 1080 ml   Output 2 ml   Net 1078 ml      Physical Exam  Constitutional:       General: She is not in acute distress.     Appearance: She is morbidly obese.   HENT:      Head: Normocephalic and atraumatic.      Nose: No congestion.   Cardiovascular:      Rate and Rhythm: Normal rate and regular rhythm.      Heart sounds: No murmur heard.  Pulmonary:      Effort: Pulmonary effort is normal.      Breath sounds: Normal breath sounds.   Abdominal:      General: Bowel sounds are normal. There is no distension.      Palpations: Abdomen is soft. There is no mass.      Tenderness: There is no abdominal tenderness.    Musculoskeletal:         General: Normal range of motion.   Skin:     General: Skin is warm.      Findings: No lesion or rash.   Neurological:      General: No focal deficit present.      Cranial Nerves: No cranial nerve deficit.   Psychiatric:         Mood and Affect: Mood normal.         Behavior: Behavior normal.       Significant Labs: All pertinent labs within the past 24 hours have been reviewed.    Significant Imaging: I have reviewed all pertinent imaging results/findings within the past 24 hours.      Assessment/Plan:      * SVT (supraventricular tachycardia)  -resolved in the ED w/adenosine  -metoprolol 25 BID per cardio recs from ED  -Pending echo  -If wall motion intact, plan outpatient Nuclear Exercise Treadmill Stress Test & follow up with [facility] in [place] for Results.  Plan 2-Week Preventice (MCT) Monitor at discharge Re: SVT (To be fit in clinic)  Recommend Outpatient Sleep Study         Chest pain  -resolved, see above      Morbid obesity  Body mass index is 71.48 kg/m². Morbid obesity complicates all aspects of disease management from diagnostic modalities to treatment. Weight loss encouraged and health benefits explained to patient.     -dietary consult         VTE Risk Mitigation (From admission, onward)         Ordered     enoxaparin injection 40 mg  Daily         06/30/22 1341     IP VTE HIGH RISK PATIENT  Once         06/30/22 1341                Discharge Planning   NOEMI:      Code Status: Full Code   Is the patient medically ready for discharge?:     Reason for patient still in hospital (select all that apply): Patient trending condition, Laboratory test, Treatment and Consult recommendations  Discharge Plan A: Home with family, Home Health                  Thairy G Reyes, DO  Department of Hospital Medicine   [facility]

## 2022-07-01 NOTE — SUBJECTIVE & OBJECTIVE
Interval History:  Seen and evaluated lying comfortably in bed.  Eyes any further chest pain or palpitations    Review of Systems   Constitutional:  Negative for fatigue and fever.   HENT:  Negative for congestion, sore throat and tinnitus.    Respiratory:  Negative for chest tightness, shortness of breath and wheezing.    Cardiovascular:  Negative for chest pain and leg swelling.   Gastrointestinal:  Negative for diarrhea and rectal pain.   Endocrine: Negative for cold intolerance and heat intolerance.   Genitourinary:  Negative for dysuria and urgency.   Musculoskeletal:  Negative for back pain.   Skin:  Negative for wound.   Neurological:  Negative for dizziness, syncope and weakness.   Hematological:  Does not bruise/bleed easily.   Psychiatric/Behavioral:  Negative for agitation. The patient is not nervous/anxious.    Objective:     Vital Signs (Most Recent):  Temp: 97.8 °F (36.6 °C) (07/01/22 1136)  Pulse: 72 (07/01/22 1136)  Resp: 20 (07/01/22 0345)  BP: 116/68 (07/01/22 1136)  SpO2: 98 % (07/01/22 1136) Vital Signs (24h Range):  Temp:  [97.6 °F (36.4 °C)-98.4 °F (36.9 °C)] 97.8 °F (36.6 °C)  Pulse:  [72-98] 72  Resp:  [18-20] 20  SpO2:  [96 %-98 %] 98 %  BP: (116-158)/(68-95) 116/68     Weight: (!) 189 kg (416 lb 10.7 oz)  Body mass index is 71.48 kg/m².    Intake/Output Summary (Last 24 hours) at 7/1/2022 1415  Last data filed at 7/1/2022 1200  Gross per 24 hour   Intake 1080 ml   Output 2 ml   Net 1078 ml      Physical Exam  Constitutional:       General: She is not in acute distress.     Appearance: She is morbidly obese.   HENT:      Head: Normocephalic and atraumatic.      Nose: No congestion.   Cardiovascular:      Rate and Rhythm: Normal rate and regular rhythm.      Heart sounds: No murmur heard.  Pulmonary:      Effort: Pulmonary effort is normal.      Breath sounds: Normal breath sounds.   Abdominal:      General: Bowel sounds are normal. There is no distension.      Palpations: Abdomen is soft.  There is no mass.      Tenderness: There is no abdominal tenderness.   Musculoskeletal:         General: Normal range of motion.   Skin:     General: Skin is warm.      Findings: No lesion or rash.   Neurological:      General: No focal deficit present.      Cranial Nerves: No cranial nerve deficit.   Psychiatric:         Mood and Affect: Mood normal.         Behavior: Behavior normal.       Significant Labs: All pertinent labs within the past 24 hours have been reviewed.    Significant Imaging: I have reviewed all pertinent imaging results/findings within the past 24 hours.

## 2022-07-01 NOTE — CONSULTS
Inpatient consult to Cardiology  Consult performed by: GILDARDO Ross  Consult ordered by: Thairy G Reyes, DO  Reason for consult: SVT         [facility]  Cardiology  Consult Note    Patient Name: Esthela Maddox  MRN: [id number]  Admission Date: 6/30/2022  Hospital Length of Stay: 0 days  Code Status: Full Code   Attending Provider: No att. providers found   Consulting Provider: GILDARDO Ross  Primary Care Physician: Primary Doctor No  Principal Problem:SVT (supraventricular tachycardia)    Patient information was obtained from patient and ER records.     Subjective:     Chief Complaint: Consultation Reason: SVT     HPI:  Ms. Maddox is a 26 year old female, unknown to [facility], who presented to the hospital with anterior chest pressure in the setting of tachycardia. Patient found to be in SVT with HR in the 180's. She was given Adenosine 6 Mg IVP x 1 Dose with conversion to SR. Patient was evaluated by [facility] via Tele Cardiology Services and started on oral beta blocker. Troponin value is noted to be 0.4, max value. Patient reports no previous history of CP/SOB. Reports relief of symptoms once rhythm conversion took place. She is hemodynamically stable. No prior history of CAD or MI. Reports history of hypertension but takes no medications on outpatient basis. She is active. [facility] is consulted for cardiac evaluation concerning her SVT.    PMH: Hypertension, Elevated BMI  PSH: Negative  Family History: Mother- DM   Social History: Tobacco- Negative, Alcohol- Occasional Use, Substance Abuse- Drug Tox Positive for Marijuana    Review of patient's allergies indicates:  No Known Allergies    No current facility-administered medications on file prior to encounter.     Current Outpatient Medications on File Prior to Encounter   Medication Sig    [DISCONTINUED] cetirizine (ZYRTEC) 10 MG tablet Take 10 mg by mouth once daily.     Review of Systems:  Review of Systems   Respiratory:  Negative for chest tightness and shortness of breath.    Cardiovascular: Negative for chest pain and leg swelling.   All other systems reviewed and are negative.    Objective:     Vital Signs (Most Recent):  Temp: 97.6 °F (36.4 °C) (07/01/22 0724)  Pulse: 83 (07/01/22 0724)  Resp: 20 (07/01/22 0345)  BP: 119/74 (07/01/22 0724)  SpO2: 97 % (07/01/22 0724) Vital Signs (24h Range):  Temp:  [97.6 °F (36.4 °C)-98.4 °F (36.9 °C)] 97.6 °F (36.4 °C)  Pulse:  [83-99] 83  Resp:  [16-20] 20  SpO2:  [95 %-98 %] 97 %  BP: (101-158)/(62-95) 119/74     Weight: (!) 189 kg (416 lb 10.7 oz)  Body mass index is 71.48 kg/m².    SpO2: 97 %  O2 Device (Oxygen Therapy): room air      Intake/Output Summary (Last 24 hours) at 7/1/2022 0906  Last data filed at 7/1/2022 0200  Gross per 24 hour   Intake 720 ml   Output 302 ml   Net 418 ml       Lines/Drains/Airways     Peripheral Intravenous Line  Duration                Peripheral IV - Single Lumen 06/30/22 2225 20 G Posterior;Right Hand <1 day              Significant Labs:  Recent Results (from the past 72 hour(s))   Troponin ISTAT    Collection Time: 06/30/22  8:05 AM   Result Value Ref Range    POC Cardiac Troponin I 0.00 <0.09 ng/mL    Sample unknown    Comprehensive metabolic panel    Collection Time: 06/30/22  8:29 AM   Result Value Ref Range    Sodium Level 143 136 - 145 mmol/L    Potassium Level 4.3 3.5 - 5.1 mmol/L    Chloride 107 98 - 107 mmol/L    Carbon Dioxide 24 22 - 29 mmol/L    Glucose Level 125 (H) 74 - 100 mg/dL    Blood Urea Nitrogen 13.0 7.0 - 18.7 mg/dL    Creatinine 0.77 0.55 - 1.02 mg/dL    Calcium Level Total 10.0 8.4 - 10.2 mg/dL    Protein Total 6.9 6.4 - 8.3 gm/dL    Albumin Level 3.4 (L) 3.5 - 5.0 gm/dL    Globulin 3.5 2.4 - 3.5 gm/dL    Albumin/Globulin Ratio 1.0 (L) 1.1 - 2.0 ratio    Bilirubin Total 0.3 <=1.5 mg/dL    Alkaline Phosphatase 91 40 - 150 unit/L    Alanine Aminotransferase 50 0 - 55 unit/L    Aspartate Aminotransferase 33 5 - 34 unit/L    Estimated  GFR- >60 mls/min/1.73/m2   CBC with Differential    Collection Time: 06/30/22  8:29 AM   Result Value Ref Range    WBC 8.7 4.5 - 11.5 x10(3)/mcL    RBC 4.87 4.20 - 5.40 x10(6)/mcL    Hgb 13.7 12.0 - 16.0 gm/dL    Hct 42.9 37.0 - 47.0 %    MCV 88.1 80.0 - 94.0 fL    MCH 28.1 27.0 - 31.0 pg    MCHC 31.9 (L) 33.0 - 36.0 mg/dL    RDW 12.7 11.5 - 17.0 %    Platelet 277 130 - 400 x10(3)/mcL    MPV 11.3 (H) 7.4 - 10.4 fL    Neut % 42.6 %    Lymph % 47.9 %    Mono % 7.4 %    Eos % 1.4 %    Basophil % 0.5 %    Lymph # 4.16 0.6 - 4.6 x10(3)/mcL    Neut # 3.7 2.1 - 9.2 x10(3)/mcL    Mono # 0.64 0.1 - 1.3 x10(3)/mcL    Eos # 0.12 0 - 0.9 x10(3)/mcL    Baso # 0.04 0 - 0.2 x10(3)/mcL   BNP    Collection Time: 06/30/22  8:29 AM   Result Value Ref Range    Natriuretic Peptide 11.2 <=100.0 pg/mL   Troponin ISTAT    Collection Time: 06/30/22  9:58 AM   Result Value Ref Range    POC Cardiac Troponin I 0.16 (H) <0.09 ng/mL    Sample unknown    COVID-19 Rapid Screening    Collection Time: 06/30/22 11:56 AM   Result Value Ref Range    SARS COV-2 MOLECULAR Negative Negative   Troponin ISTAT    Collection Time: 06/30/22 12:24 PM   Result Value Ref Range    POC Cardiac Troponin I 0.43 (H) <0.09 ng/mL    Sample unknown    Pregnancy, urine rapid    Collection Time: 06/30/22  3:06 PM   Result Value Ref Range    Beta hCG Qualitative, Urine Negative Negative   Drug Screen, Urine    Collection Time: 06/30/22  3:08 PM   Result Value Ref Range    Amphetamines, Urine Negative Negative    Barbituates, Urine Negative Negative    Benzodiazepine, Urine Negative Negative    Cannabinoids, Urine Positive (A) Negative    Cocaine, Urine Negative Negative    Opiates, Urine Negative Negative    Phencyclidine, Urine Negative Negative    pH, Urine 6.0 3.0 - 11.0    Specific Gravity, Urine Auto >=1.030 1.001 - 1.035   Troponin I    Collection Time: 06/30/22  7:32 PM   Result Value Ref Range    Troponin-I 0.345 (H) 0.000 - 0.045 ng/mL   Basic  Metabolic Panel    Collection Time: 07/01/22  6:23 AM   Result Value Ref Range    Sodium Level 140 136 - 145 mmol/L    Potassium Level 3.6 3.5 - 5.1 mmol/L    Chloride 107 98 - 107 mmol/L    Carbon Dioxide 26 22 - 29 mmol/L    Glucose Level 113 (H) 74 - 100 mg/dL    Blood Urea Nitrogen 10.3 7.0 - 18.7 mg/dL    Creatinine 0.68 0.55 - 1.02 mg/dL    BUN/Creatinine Ratio 15     Calcium Level Total 9.5 8.4 - 10.2 mg/dL    Estimated GFR- >60 mls/min/1.73/m2    Anion Gap 7.0 mEq/L   Lipid Panel    Collection Time: 07/01/22  6:23 AM   Result Value Ref Range    Cholesterol Total 161 <=200 mg/dL    HDL Cholesterol 41 35 - 60 mg/dL    Triglyceride 123 37 - 140 mg/dL    Cholesterol/HDL Ratio 4 0 - 5    Very Low Density Lipoprotein 25     LDL Cholesterol 95.00 50.00 - 140.00 mg/dL   Hemoglobin A1C    Collection Time: 07/01/22  6:23 AM   Result Value Ref Range    Hemoglobin A1c 5.8 <=7.0 %    Estimated Average Glucose 119.8 mg/dL   CBC with Differential    Collection Time: 07/01/22  6:23 AM   Result Value Ref Range    WBC 7.0 4.5 - 11.5 x10(3)/mcL    RBC 4.43 4.20 - 5.40 x10(6)/mcL    Hgb 12.5 12.0 - 16.0 gm/dL    Hct 39.7 37.0 - 47.0 %    MCV 89.6 80.0 - 94.0 fL    MCH 28.2 27.0 - 31.0 pg    MCHC 31.5 (L) 33.0 - 36.0 mg/dL    RDW 13.0 11.5 - 17.0 %    Platelet 263 130 - 400 x10(3)/mcL    MPV 11.3 (H) 7.4 - 10.4 fL    Neut % 42.1 %    Lymph % 46.6 %    Mono % 8.8 %    Eos % 2.0 %    Basophil % 0.4 %    Lymph # 3.27 0.6 - 4.6 x10(3)/mcL    Neut # 2.9 2.1 - 9.2 x10(3)/mcL    Mono # 0.62 0.1 - 1.3 x10(3)/mcL    Eos # 0.14 0 - 0.9 x10(3)/mcL    Baso # 0.03 0 - 0.2 x10(3)/mcL    IG# 0.01 0 - 0.04 x10(3)/mcL    IG% 0.1 %     EKG:        Telemetry: Sinus Rhythm    Physical Exam  Vitals reviewed.   Constitutional:       Appearance: She is obese.   HENT:      Head: Normocephalic.      Mouth/Throat:      Mouth: Mucous membranes are moist.      Pharynx: Oropharynx is clear.   Cardiovascular:      Rate and Rhythm: Normal rate  and regular rhythm.      Heart sounds: Normal heart sounds. No murmur heard.  Pulmonary:      Effort: Pulmonary effort is normal. No respiratory distress.      Breath sounds: Normal breath sounds. No wheezing.   Abdominal:      Palpations: Abdomen is soft.   Musculoskeletal:      Cervical back: Neck supple.      Right lower leg: No edema.      Left lower leg: No edema.   Skin:     General: Skin is warm and dry.   Neurological:      General: No focal deficit present.      Mental Status: She is alert and oriented to person, place, and time. Mental status is at baseline.   Psychiatric:         Mood and Affect: Mood normal.         Behavior: Behavior normal.       Home Medications:   No current facility-administered medications on file prior to encounter.     Current Outpatient Medications on File Prior to Encounter   Medication Sig Dispense Refill    [DISCONTINUED] cetirizine (ZYRTEC) 10 MG tablet Take 10 mg by mouth once daily.         Current Inpatient Medications:    Current Facility-Administered Medications:     acetaminophen tablet 650 mg, 650 mg, Oral, Q4H PRN, Emanuel G Reyes, DO, 650 mg at 06/30/22 2048    acetaminophen tablet 650 mg, 650 mg, Oral, Q8H PRN, Emanuel G Reyes, DO    enoxaparin injection 40 mg, 40 mg, Subcutaneous, Daily, Thairy G Reyes, DO, 40 mg at 06/30/22 1758    HYDROcodone-acetaminophen 5-325 mg per tablet 1 tablet, 1 tablet, Oral, Q6H PRN, Thairy G Reyes, DO    labetalol 20 mg/4 mL (5 mg/mL) IV syring, 10 mg, Intravenous, Q4H PRN, Emanuel Goldyes, DO    melatonin tablet 9 mg, 9 mg, Oral, Nightly PRN, Emanuel G Reyes, DO    metoprolol tartrate (LOPRESSOR) tablet 25 mg, 25 mg, Oral, BID, Emanuel G Reyes, DO, 25 mg at 07/01/22 0835    ondansetron injection 4 mg, 4 mg, Intravenous, Q8H PRN, Thairy G Reyes, DO    sodium chloride 0.9% flush 2 mL, 2 mL, Intravenous, Q12H PRN, Thairy G Reyes, DO    VTE Risk Mitigation (From admission, onward)         Ordered     enoxaparin injection 40 mg   Daily         06/30/22 1341     IP VTE HIGH RISK PATIENT  Once         06/30/22 1341              Assessment:   SVT (New Diagnosis) with Successful Conversion to SR with Adenosine  NSTEMI- Type II in the Setting of Tachycardia  Hypertension  Elevated BMI/Obesity  Nocturnal Snoring with no prior history of Diagnosed Sleep Apnea  No History of GI Bleed    Plan:   Continue Metoprolol Tartrate 25 Mg PO BID  Check Echocardiogram to rule out WMA  Replete K+; K Goal- 4, Mg Goal- 2  Continue Tele Monitoring while in Hospital  If wall motion intact, plan outpatient Nuclear Exercise Treadmill Stress Test & follow up with [facility] in [place] for Results.  Plan 2-Week Preventice (MCT) Monitor at discharge Re: SVT (To be fit in clinic)  Recommend Outpatient Sleep Study    Case/Plan of Care discussed with and approved by  Dr. Santamaria ([facility] Cardiologist).    GILDARDO Ross  Cardiology  [facility]  07/01/2022 9:06 AM

## 2022-07-01 NOTE — ASSESSMENT & PLAN NOTE
-resolved in the ED w/adenosine  -metoprolol 25 BID per cardio recs from ED  -Pending echo  -If wall motion intact, plan outpatient Nuclear Exercise Treadmill Stress Test & follow up with [facility] in [place] for Results.  Plan 2-Week Preventice (MCT) Monitor at discharge Re: SVT (To be fit in clinic)  Recommend Outpatient Sleep Study

## 2022-07-02 VITALS
HEIGHT: 64 IN | RESPIRATION RATE: 18 BRPM | SYSTOLIC BLOOD PRESSURE: 124 MMHG | DIASTOLIC BLOOD PRESSURE: 78 MMHG | TEMPERATURE: 98 F | WEIGHT: 293 LBS | BODY MASS INDEX: 50.02 KG/M2 | HEART RATE: 76 BPM | OXYGEN SATURATION: 97 %

## 2022-07-02 PROBLEM — I47.10 SVT (SUPRAVENTRICULAR TACHYCARDIA): Status: RESOLVED | Noted: 2022-06-30 | Resolved: 2022-07-02

## 2022-07-02 PROBLEM — R07.9 CHEST PAIN: Status: RESOLVED | Noted: 2022-06-30 | Resolved: 2022-07-02

## 2022-07-02 LAB
BSA FOR ECHO PROCEDURE: 2.92 M2
EJECTION FRACTION: 60 %

## 2022-07-02 PROCEDURE — 25000003 PHARM REV CODE 250: Performed by: NURSE PRACTITIONER

## 2022-07-02 PROCEDURE — G0378 HOSPITAL OBSERVATION PER HR: HCPCS

## 2022-07-02 PROCEDURE — 25000003 PHARM REV CODE 250: Performed by: STUDENT IN AN ORGANIZED HEALTH CARE EDUCATION/TRAINING PROGRAM

## 2022-07-02 RX ADMIN — POTASSIUM BICARBONATE 25 MEQ: 977.5 TABLET, EFFERVESCENT ORAL at 09:07

## 2022-07-02 RX ADMIN — METOPROLOL TARTRATE 25 MG: 25 TABLET, FILM COATED ORAL at 09:07

## 2022-07-02 RX ADMIN — ASPIRIN 81 MG: 81 TABLET, COATED ORAL at 09:07

## 2022-07-02 NOTE — PLAN OF CARE
Problem: Adult Inpatient Plan of Care  Goal: Plan of Care Review  Outcome: Met  Goal: Patient-Specific Goal (Individualized)  Outcome: Met  Goal: Absence of Hospital-Acquired Illness or Injury  Outcome: Met  Goal: Optimal Comfort and Wellbeing  Outcome: Met  Goal: Readiness for Transition of Care  Outcome: Met     Problem: Bariatric Environmental Safety  Goal: Safety Maintained with Care  Outcome: Met     Problem: Fall Injury Risk  Goal: Absence of Fall and Fall-Related Injury  Outcome: Met     Problem: Cardiac Output Decreased  Goal: Effective Cardiac Output  Outcome: Met

## 2022-07-02 NOTE — NURSING
Discharge instructions given to pt and significant other. Stressed the importance on all follow up appts listed and medication. Voiced understanding. Questions asked and answered.

## 2022-07-02 NOTE — PLAN OF CARE
[facility]  Discharge Final Note    Primary Care Provider: Primary Doctor No    Expected Discharge Date: 7/2/2022    Final Discharge Note (most recent)     Final Note - 07/02/22 1048        Final Note    Assessment Type Final Discharge Note     Anticipated Discharge Disposition Home or Self Care     What phone number can be called within the next 1-3 days to see how you are doing after discharge? [phone]     Hospital Resources/Appts/Education Provided Provided patient/caregiver with written discharge plan information        Post-Acute Status    Discharge Delays None known at this time                 Important Message from Medicare  Important Message from Medicare regarding Discharge Appeal Rights: Given to patient/caregiver          Contact Info     Darian Rogers MD   Specialty: Cardiovascular Disease, Cardiology    [address]  [facility] [address]   Phone: [phone]       Next Steps: Go to    Instructions: - Tuesday, July 5, 2022 @ 8:45 am ( Stress Test )     [address] [place] [address] / [phone]    -  Friday, July 8, 2022 @ 3:00 pm ( Preventice Monitor )     [address] [place] [address] / [phone]    -  Follow up appointment with Dr. Rogers's NP, Boyd, on Wednesday, July 27, 2022 @ 8:15 am.     [address], [place] [address] / [phone]    GILDARDO Oliva   Specialty: Nurse Practitioner    [place] [id number] [facility]  [address]  [facility] [place] [address]   Phone: [phone]       Next Steps: Follow up    Instructions: The office will contact the patient with appointment date and time.  Spoke to Yesica

## 2022-07-02 NOTE — ASSESSMENT & PLAN NOTE
-resolved in the ED w/adenosine  -metoprolol 25 BID per cardio recs from ED  -outpatient Nuclear Exercise Treadmill Stress Test & follow up with [facility] in [place] for Results.  Plan 2-Week Preventice (MCT) Monitor at discharge Re: SVT (To be fit in clinic)  Recommend Outpatient Sleep Study

## 2022-07-02 NOTE — PLAN OF CARE
Problem: Fall Injury Risk  Goal: Absence of Fall and Fall-Related Injury  Outcome: Ongoing, Progressing  Intervention: Identify and Manage Contributors  Flowsheets (Taken 7/2/2022 0358)  Self-Care Promotion: independence encouraged  Medication Review/Management: medications reviewed  Intervention: Promote Injury-Free Environment  Flowsheets (Taken 7/2/2022 0358)  Safety Promotion/Fall Prevention:   nonskid shoes/socks when out of bed   side rails raised x 2

## 2022-07-02 NOTE — ASSESSMENT & PLAN NOTE
Body mass index is 70.91 kg/m². Morbid obesity complicates all aspects of disease management from diagnostic modalities to treatment. Weight loss encouraged and health benefits explained to patient.     -dietary consult

## 2022-07-02 NOTE — DISCHARGE SUMMARY
[facility]  Hospital Medicine  Discharge Summary      Patient Name: Esthela Maddox  MRN: [id number]  Patient Class: OP- Observation  Admission Date: 6/30/2022  Hospital Length of Stay: 0 days  Discharge Date and Time:  07/02/2022 8:59 AM  Attending Physician: Kamryn att. providers found   Discharging Provider: Thairy G Reyes, DO  Primary Care Provider: Primary Doctor Kamryn      HPI:   18-year-old female a past medical history as morbid obesity and marijuana use who presents with complaint of chest pain today.  Patient reports she was at work when all of a sudden had sudden-onset chest pain.  Patient reports it felt like she was having a panic attack however these symptoms were different and worse in severity.  She describes the pain as sternal, constant and did not relieved until she got medication in the ED.  A friend's to associated shortness of breath.  Denies cocaine use.  Heart rate in the ED was 187. Initial troponin was 0.00 and repeat was 0.16.  EKG done on presentation showed sinus tachycardia at a rate of 187 beats per minute with marked ST abnormalities repeat done once heart rate was well controlled showed normal sinus rhythm at 85 beats per minute with resolved ST depressions. Cardiology was consulted from the ED who recommended metoprolol 25 mg b.i.d. and 24 hour observation.  With outpatient follow-up.        * No surgery found *      Hospital Course:   In supraventricular tachycardia broke in the ED after 6 mg IV push of adenosine, she has maintained heart rate in the 80s with metoprolol tartrate 25 p.o. b.i.d., no further recurrence.  Troponin did uptrend to a peak of 0.4 that has now started to decrease to 0.345.  A1c 5.8, TSH 0.76, lipid panel all within normal limits-->assessed for risk stratification. Echocardiogram Echocardiogram performed for further risk stratification, preliminary read is showing normal systolic function, preserved ejection fraction and normal  diastolic function.  Aortic valve appears structurally normal, pulmonic valve also structurally normal.  Pericardium is normal and no pericardial effusion.  We have already scheduled patient her follow-up appointments with cardiology for preventive monitor and stress test.  Patient has been educated that she is to adhere to those follow-up appointments. In the meantime continue with aspirin, metoprolol.       Goals of Care Treatment Preferences:  Code Status: Full Code      Consults:   Consults (From admission, onward)        Status Ordering Provider     Inpatient consult to Registered Dietitian/Nutritionist  Once        Provider:  (Not yet assigned)    Completed REYES, THAIRY G     Inpatient consult to Cardiology  Once        Provider:  Darian Rogers MD    Completed REYES, THAIRY G          * SVT (supraventricular tachycardia)  -resolved in the ED w/adenosine  -metoprolol 25 BID per cardio recs from ED  -outpatient Nuclear Exercise Treadmill Stress Test & follow up with [facility] in [place] for Results.  Plan 2-Week Preventice (MCT) Monitor at discharge Re: SVT (To be fit in clinic)  Recommend Outpatient Sleep Study         Chest pain  -resolved, see above      Morbid obesity  Body mass index is 70.91 kg/m². Morbid obesity complicates all aspects of disease management from diagnostic modalities to treatment. Weight loss encouraged and health benefits explained to patient.     -dietary consult         Final Active Diagnoses:    Diagnosis Date Noted POA    PRINCIPAL PROBLEM:  SVT (supraventricular tachycardia) [I47.1] 06/30/2022 Yes    Chest pain [R07.9] 06/30/2022 Yes    Morbid obesity [E66.01] 06/30/2022 Yes      Problems Resolved During this Admission:       Discharged Condition: stable    Disposition: Home or Self Care    Follow Up:   Follow-up Information     Darian Rogers MD. Go to.    Specialties: Cardiovascular Disease, Cardiology  Why: - Tuesday, July 5, 2022 @ 8:45 am ( Stress Test )     [address] [place] [address] / [phone]    -  Friday, July 8, 2022 @ 3:00 pm ( Preventice Monitor )     [place] [address] / [phone]    -  Follow up appointment with Dr. Rogers's NP, Hunter, on Wednesday, July 27, 2022 @ 8:15 am.     [address] [facility] [place] [address] / [phone]  Contact information:  [address] [facility] [address]  [phone]             GILDARDO Oliva Follow up.    Specialty: Nurse Practitioner  Why: The office will contact the patient with appointment date and time.  Spoke to Emely.  Contact information:  [address]  [address]  [facility] [address]  [phone]                       Patient Instructions:      Notify your health care provider if you experience any of the following:  difficulty breathing or increased cough       Significant Diagnostic Studies: Labs:   BMP:   Recent Labs   Lab 07/01/22  0623 07/01/22  1019     --    K 3.6  --    CO2 26  --    BUN 10.3  --    CREATININE 0.68  --    CALCIUM 9.5  --    MG  --  2.10       Pending Diagnostic Studies:     None         Medications:  Reconciled Home Medications:      Medication List      START taking these medications    aspirin 81 MG EC tablet  Commonly known as: ECOTRIN  Take 1 tablet (81 mg total) by mouth once daily.     metoprolol tartrate 25 MG tablet  Commonly known as: LOPRESSOR  Take 1 tablet (25 mg total) by mouth 2 (two) times daily.            Indwelling Lines/Drains at time of discharge:   Lines/Drains/Airways     None                 Time spent on the discharge of patient: 35 minutes         Thairy G Reyes, DO  Department of Hospital Medicine  [facility]

## 2022-07-19 ENCOUNTER — HOSPITAL ENCOUNTER (EMERGENCY)
Facility: HOSPITAL | Age: 27
Discharge: HOME | End: 2022-07-19
Attending: EMERGENCY MEDICINE
Payer: MEDICAID

## 2022-07-19 VITALS
OXYGEN SATURATION: 98 % | DIASTOLIC BLOOD PRESSURE: 78 MMHG | HEART RATE: 81 BPM | RESPIRATION RATE: 22 BRPM | BODY MASS INDEX: 50.02 KG/M2 | WEIGHT: 293 LBS | TEMPERATURE: 98 F | SYSTOLIC BLOOD PRESSURE: 133 MMHG | HEIGHT: 64 IN

## 2022-07-19 DIAGNOSIS — R00.2 PALPITATION: ICD-10-CM

## 2022-07-19 DIAGNOSIS — R00.2 PALPITATIONS: Primary | ICD-10-CM

## 2022-07-19 PROCEDURE — 93005 ELECTROCARDIOGRAM TRACING: CPT

## 2022-07-19 PROCEDURE — 93010 ELECTROCARDIOGRAM REPORT: CPT | Mod: ,,, | Performed by: INTERNAL MEDICINE

## 2022-07-19 PROCEDURE — 99283 EMERGENCY DEPT VISIT LOW MDM: CPT | Mod: 25

## 2022-07-19 PROCEDURE — 93010 EKG 12-LEAD: ICD-10-PCS | Mod: ,,, | Performed by: INTERNAL MEDICINE

## 2022-07-19 NOTE — ED PROVIDER NOTES
Encounter Date: 7/19/2022       History     Chief Complaint   Patient presents with    Palpitations     Reports when she woke up this morning was feeling weak with some chest tightness so she called the company that is monitoring her heart rate. They told her her heart rate was going up and down. Patient reports she took her metoprolol and it got better. The nurse told her to take another half of metoprolol  but she didn't. She reports she rather come check it out because she doesn't want to go to work and start feeling bad           Reports when she woke up this morning was feeling weak with some chest tightness so she called the company that is monitoring her heart rate. They told her her heart rate was going up and down. Patient reports she took her metoprolol and it got better. The nurse told her to take another half of metoprolol  but she didn't. She reports she rather come check it out because she doesn't want to go to work and start feeling bad          Review of patient's allergies indicates:  No Known Allergies  Past Medical History:   Diagnosis Date    Hypertension     Morbid obesity     SVT (supraventricular tachycardia)      History reviewed. No pertinent surgical history.  No family history on file.  Social History     Tobacco Use    Smoking status: Never Smoker    Smokeless tobacco: Never Used   Substance Use Topics    Alcohol use: Not Currently    Drug use: Never     Review of Systems   Cardiovascular: Positive for palpitations. Negative for chest pain.   All other systems reviewed and are negative.      Physical Exam     Initial Vitals [07/19/22 1235]   BP Pulse Resp Temp SpO2   135/81 84 (!) 22 98.3 °F (36.8 °C) 97 %      MAP       --         Physical Exam    Nursing note and vitals reviewed.  Constitutional: She appears well-developed and well-nourished.   HENT:   Head: Normocephalic and atraumatic.   Eyes: Conjunctivae are normal. Pupils are equal, round, and reactive to light.   Neck:  Neck supple.   Normal range of motion.  Cardiovascular: Normal rate, regular rhythm and normal heart sounds.   Pulmonary/Chest: Breath sounds normal.   Musculoskeletal:         General: Normal range of motion.      Cervical back: Normal range of motion and neck supple.     Neurological: She is alert and oriented to person, place, and time.   Skin: Skin is warm and dry. Capillary refill takes 2 to 3 seconds.   Psychiatric: She has a normal mood and affect.         ED Course   Procedures  Labs Reviewed - No data to display  EKG Readings: (Independently Interpreted)   Initial Reading: No STEMI. Rhythm: Normal Sinus Rhythm. Ectopy: No Ectopy. Conduction: Normal. Axis: Normal.       Imaging Results    None          Medications - No data to display              ED Course as of 07/19/22 1409   Tue Jul 19, 2022   1407 Follow up 7-21-22 as scheduled with Cardiology. Take Metoprolol as directed by Cardiology. Return to ER for any concerns [AH]      ED Course User Index  [AH] GILDARDO Silverman             Clinical Impression:   Final diagnoses:  [R00.2] Palpitation  [R00.2] Palpitations (Primary)          ED Disposition Condition    Discharge Stable        ED Prescriptions     None        Follow-up Information    None          Chris Rosario MD  07/20/22 8158

## 2022-07-19 NOTE — Clinical Note
"Esthela"Timoteo Maddox was seen and treated in our emergency department on 7/19/2022.  She may return to work on 07/20/2022.  PLEASE ALLOW THE PATIENT TO HAVE A CHAIR AT HER WORK STATION.     If you have any questions or concerns, please don't hesitate to call.      Chris Rosario MD"
English

## 2022-08-01 ENCOUNTER — OFFICE VISIT (OUTPATIENT)
Dept: FAMILY MEDICINE | Facility: CLINIC | Age: 27
End: 2022-08-01
Payer: MEDICAID

## 2022-08-01 VITALS
RESPIRATION RATE: 18 BRPM | SYSTOLIC BLOOD PRESSURE: 124 MMHG | BODY MASS INDEX: 50.02 KG/M2 | OXYGEN SATURATION: 100 % | DIASTOLIC BLOOD PRESSURE: 76 MMHG | HEIGHT: 64 IN | WEIGHT: 293 LBS | TEMPERATURE: 98 F | HEART RATE: 98 BPM

## 2022-08-01 DIAGNOSIS — Z13.31 POSITIVE DEPRESSION SCREENING: ICD-10-CM

## 2022-08-01 DIAGNOSIS — I47.10 SVT (SUPRAVENTRICULAR TACHYCARDIA): ICD-10-CM

## 2022-08-01 DIAGNOSIS — F32.A DEPRESSION, UNSPECIFIED DEPRESSION TYPE: ICD-10-CM

## 2022-08-01 DIAGNOSIS — E66.01 MORBID OBESITY: Primary | ICD-10-CM

## 2022-08-01 DIAGNOSIS — Z76.89 ENCOUNTER TO ESTABLISH CARE: ICD-10-CM

## 2022-08-01 DIAGNOSIS — I10 HYPERTENSION, UNSPECIFIED TYPE: ICD-10-CM

## 2022-08-01 DIAGNOSIS — F43.10 PTSD (POST-TRAUMATIC STRESS DISORDER): ICD-10-CM

## 2022-08-01 PROCEDURE — 3074F PR MOST RECENT SYSTOLIC BLOOD PRESSURE < 130 MM HG: ICD-10-PCS | Mod: CPTII,,, | Performed by: NURSE PRACTITIONER

## 2022-08-01 PROCEDURE — 1159F MED LIST DOCD IN RCRD: CPT | Mod: CPTII,,, | Performed by: NURSE PRACTITIONER

## 2022-08-01 PROCEDURE — 3008F PR BODY MASS INDEX (BMI) DOCUMENTED: ICD-10-PCS | Mod: CPTII,,, | Performed by: NURSE PRACTITIONER

## 2022-08-01 PROCEDURE — 3078F PR MOST RECENT DIASTOLIC BLOOD PRESSURE < 80 MM HG: ICD-10-PCS | Mod: CPTII,,, | Performed by: NURSE PRACTITIONER

## 2022-08-01 PROCEDURE — 99214 OFFICE O/P EST MOD 30 MIN: CPT | Mod: ,,, | Performed by: NURSE PRACTITIONER

## 2022-08-01 PROCEDURE — 1160F PR REVIEW ALL MEDS BY PRESCRIBER/CLIN PHARMACIST DOCUMENTED: ICD-10-PCS | Mod: CPTII,,, | Performed by: NURSE PRACTITIONER

## 2022-08-01 PROCEDURE — 3074F SYST BP LT 130 MM HG: CPT | Mod: CPTII,,, | Performed by: NURSE PRACTITIONER

## 2022-08-01 PROCEDURE — 3008F BODY MASS INDEX DOCD: CPT | Mod: CPTII,,, | Performed by: NURSE PRACTITIONER

## 2022-08-01 PROCEDURE — 1159F PR MEDICATION LIST DOCUMENTED IN MEDICAL RECORD: ICD-10-PCS | Mod: CPTII,,, | Performed by: NURSE PRACTITIONER

## 2022-08-01 PROCEDURE — 99214 PR OFFICE/OUTPT VISIT, EST, LEVL IV, 30-39 MIN: ICD-10-PCS | Mod: ,,, | Performed by: NURSE PRACTITIONER

## 2022-08-01 PROCEDURE — 1160F RVW MEDS BY RX/DR IN RCRD: CPT | Mod: CPTII,,, | Performed by: NURSE PRACTITIONER

## 2022-08-01 PROCEDURE — 3078F DIAST BP <80 MM HG: CPT | Mod: CPTII,,, | Performed by: NURSE PRACTITIONER

## 2022-08-01 RX ORDER — METOPROLOL TARTRATE 25 MG/1
25 TABLET, FILM COATED ORAL 2 TIMES DAILY
Qty: 60 TABLET | Refills: 11 | Status: SHIPPED | OUTPATIENT
Start: 2022-08-01 | End: 2023-02-02 | Stop reason: SDUPTHER

## 2022-08-01 NOTE — PROGRESS NOTES
Subjective:       Patient ID: Esthela Maddox is a 26 y.o. female.    Chief Complaint: No chief complaint on file.    This is a 26-year-old female who presents to the clinic to establish care.  Patient denies prior PCP.  Patient reports past medical history of hypertension, hemorrhoids, syphilis and morbid obesity.  Patient denies any past surgeries.  The    Review of Systems   Constitutional: Negative.    HENT: Negative.    Eyes: Negative.    Respiratory: Negative.    Cardiovascular: Negative.    Gastrointestinal: Negative.    Endocrine: Negative.    Genitourinary: Negative.    Musculoskeletal: Negative.    Integumentary:  Negative.   Allergic/Immunologic: Negative.    Neurological: Negative.    Hematological: Negative.    Psychiatric/Behavioral: Positive for dysphoric mood.         Objective:      Physical Exam  Constitutional:       Appearance: Normal appearance. She is obese.   HENT:      Head: Normocephalic.      Right Ear: Tympanic membrane, ear canal and external ear normal.      Left Ear: Tympanic membrane, ear canal and external ear normal.      Nose: Nose normal.      Mouth/Throat:      Mouth: Mucous membranes are moist.      Pharynx: Oropharynx is clear.   Eyes:      Extraocular Movements: Extraocular movements intact.      Conjunctiva/sclera: Conjunctivae normal.      Pupils: Pupils are equal, round, and reactive to light.   Cardiovascular:      Rate and Rhythm: Normal rate and regular rhythm.      Pulses: Normal pulses.      Heart sounds: Normal heart sounds.   Pulmonary:      Effort: Pulmonary effort is normal.      Breath sounds: Normal breath sounds.   Abdominal:      General: Bowel sounds are normal.      Palpations: Abdomen is soft.   Musculoskeletal:         General: Normal range of motion.      Cervical back: Normal range of motion and neck supple.   Skin:     General: Skin is warm and dry.   Neurological:      General: No focal deficit present.      Mental Status: She is alert and oriented  to person, place, and time. Mental status is at baseline.   Psychiatric:         Mood and Affect: Mood normal.         Behavior: Behavior normal.         Thought Content: Thought content normal.         Judgment: Judgment normal.         Assessment:       Problem List Items Addressed This Visit        Psychiatric    Depression     Referral sent to Mental Health.  Instructed patient to report to ED with any suicidal or homicidal ideations.           Relevant Orders    Ambulatory referral/consult to Psychiatry       Cardiac/Vascular    SVT (supraventricular tachycardia)     Stable.  Continue current management.  Keep scheduled follow-up appointments with cardiology.           Hypertension     Blood pressure 124/76.  Continue current management.  Dash diet advised.              Endocrine    Morbid obesity - Primary     Low fat, low carb diet advised.  Increase physical activity to least 30 minutes a day on most days of the week as tolerated.              Other    Encounter to establish care      Other Visit Diagnoses     Positive depression screening        I have reviewed the positive depression score which warrants active treatment with psychotherapy and/or medications.    PTSD (post-traumatic stress disorder)        Relevant Orders    Ambulatory referral/consult to Psychiatry          Plan:       Return to clinic as needed and 6 months for wellness exam.  Labs to be completed prior to visit.    I have used clinical judgement based on duration and functional status to consider definite necessity for treatment.

## 2022-08-01 NOTE — ASSESSMENT & PLAN NOTE
Low fat, low carb diet advised.  Increase physical activity to least 30 minutes a day on most days of the week as tolerated.

## 2022-08-01 NOTE — ASSESSMENT & PLAN NOTE
Referral sent to Mental Health.  Instructed patient to report to ED with any suicidal or homicidal ideations.

## 2022-08-14 ENCOUNTER — HOSPITAL ENCOUNTER (EMERGENCY)
Facility: HOSPITAL | Age: 27
Discharge: HOME | End: 2022-08-14
Payer: MEDICAID

## 2022-08-14 VITALS
HEIGHT: 64 IN | BODY MASS INDEX: 50.02 KG/M2 | TEMPERATURE: 98 F | SYSTOLIC BLOOD PRESSURE: 163 MMHG | WEIGHT: 293 LBS | DIASTOLIC BLOOD PRESSURE: 83 MMHG | HEART RATE: 85 BPM | OXYGEN SATURATION: 98 % | RESPIRATION RATE: 23 BRPM

## 2022-08-14 DIAGNOSIS — F41.9 ANXIETY: Primary | ICD-10-CM

## 2022-08-14 PROCEDURE — 99282 EMERGENCY DEPT VISIT SF MDM: CPT

## 2022-08-14 NOTE — ED PROVIDER NOTES
Encounter Date: 8/14/2022       History     Chief Complaint   Patient presents with    Chest Pain     Chest pain started last night. Came from urgent care with EKG NSR. Pt initial complaint SOB but in triage c/o CP.      27 yo F c/o lack of sleep, fatigue from working 10 days in a row and increased stress from work causing sob and chest pressure today        Review of patient's allergies indicates:  No Known Allergies  Past Medical History:   Diagnosis Date    Hypertension     Morbid obesity     SVT (supraventricular tachycardia)      No past surgical history on file.  No family history on file.  Social History     Tobacco Use    Smoking status: Never Smoker    Smokeless tobacco: Never Used   Substance Use Topics    Alcohol use: Not Currently     Comment: ocass    Drug use: Yes     Frequency: 2.0 times per week     Types: Marijuana     Review of Systems   Respiratory: Positive for shortness of breath.    Cardiovascular:        Chest pressure   All other systems reviewed and are negative.      Physical Exam     Initial Vitals [08/14/22 1220]   BP Pulse Resp Temp SpO2   135/79 89 20 97.8 °F (36.6 °C) 100 %      MAP       --         Physical Exam    Nursing note and vitals reviewed.  Constitutional: She appears well-developed and well-nourished.   HENT:   Head: Normocephalic and atraumatic.   Eyes: Conjunctivae are normal. Pupils are equal, round, and reactive to light.   Neck: Neck supple.   Normal range of motion.  Cardiovascular: Normal rate, regular rhythm and normal heart sounds.   Musculoskeletal:         General: Normal range of motion.      Cervical back: Normal range of motion and neck supple.     Neurological: She is alert and oriented to person, place, and time.   Skin: Skin is dry. Capillary refill takes less than 2 seconds.   Psychiatric: She has a normal mood and affect.         ED Course   Procedures  Labs Reviewed - No data to display  EKG Readings: (Independently Interpreted)   Rhythm: Normal  Sinus Rhythm. Ectopy: No Ectopy. Conduction: Normal. ST Segments: Normal ST Segments. T Waves: Normal. Clinical Impression: Normal Sinus Rhythm       Imaging Results    None          Medications - No data to display                       Clinical Impression:   Final diagnoses:  [F41.9] Anxiety (Primary)          ED Disposition Condition    Discharge Stable        ED Prescriptions     None        Follow-up Information     Follow up With Specialties Details Why Contact Info    GILDARDO Oliva Nurse Practitioner In 1 day  [address] [facility] [address]  [phone]             GILDARDO Silverman  08/14/22 4511     Render Note In Bullet Format When Appropriate: No Detail Level: Zone Duration Of Freeze Thaw-Cycle (Seconds): 5 Consent: The patient's consent was obtained including but not limited to risks of crusting, scabbing, blistering, scarring, darker or lighter pigmentary change, recurrence, incomplete removal and infection. Post-Care Instructions: I reviewed with the patient in detail post-care instructions. Patient is to wear sunprotection, and avoid picking at any of the treated lesions. Pt may apply Vaseline to crusted or scabbing areas.

## 2022-08-14 NOTE — Clinical Note
"Esthela Bellajune Maddox was seen and treated in our emergency department on 8/14/2022.  She may return to work on 08/17/2022.       If you have any questions or concerns, please don't hesitate to call.      GILDARDO Silverman"

## 2022-08-14 NOTE — ED NOTES
"Reports she started with chest pain and shortness of breath while at work today. "I don't know if I'm just too stressed at work".   "

## 2023-01-26 DIAGNOSIS — Z00.00 ENCOUNTER FOR WELLNESS EXAMINATION: Primary | ICD-10-CM

## 2023-01-26 DIAGNOSIS — I10 HYPERTENSION, UNSPECIFIED TYPE: ICD-10-CM

## 2023-01-26 DIAGNOSIS — Z00.00 WELL ADULT EXAM: Primary | ICD-10-CM

## 2023-02-01 ENCOUNTER — LAB VISIT (OUTPATIENT)
Dept: LAB | Facility: HOSPITAL | Age: 28
End: 2023-02-01
Attending: NURSE PRACTITIONER
Payer: MEDICAID

## 2023-02-01 DIAGNOSIS — Z00.00 ENCOUNTER FOR WELLNESS EXAMINATION: ICD-10-CM

## 2023-02-01 DIAGNOSIS — Z00.00 WELL ADULT EXAM: ICD-10-CM

## 2023-02-01 LAB
ALBUMIN SERPL-MCNC: 3.6 G/DL (ref 3.5–5)
ALBUMIN/GLOB SERPL: 0.9 RATIO (ref 1.1–2)
ALP SERPL-CCNC: 99 UNIT/L (ref 40–150)
ALT SERPL-CCNC: 71 UNIT/L (ref 0–55)
AST SERPL-CCNC: 49 UNIT/L (ref 5–34)
BASOPHILS # BLD AUTO: 0.02 X10(3)/MCL (ref 0–0.2)
BASOPHILS NFR BLD AUTO: 0.3 %
BILIRUBIN DIRECT+TOT PNL SERPL-MCNC: 0.6 MG/DL
BUN SERPL-MCNC: 6.3 MG/DL (ref 7–18.7)
CALCIUM SERPL-MCNC: 9.6 MG/DL (ref 8.4–10.2)
CHLORIDE SERPL-SCNC: 104 MMOL/L (ref 98–107)
CHOLEST SERPL-MCNC: 163 MG/DL
CHOLEST/HDLC SERPL: 4 {RATIO} (ref 0–5)
CO2 SERPL-SCNC: 28 MMOL/L (ref 22–29)
CREAT SERPL-MCNC: 0.79 MG/DL (ref 0.55–1.02)
CREAT UR-MCNC: 241.3 MG/DL (ref 47–110)
DEPRECATED CALCIDIOL+CALCIFEROL SERPL-MC: 5.4 NG/ML (ref 30–80)
EOSINOPHIL # BLD AUTO: 0.11 X10(3)/MCL (ref 0–0.9)
EOSINOPHIL NFR BLD AUTO: 1.6 %
ERYTHROCYTE [DISTWIDTH] IN BLOOD BY AUTOMATED COUNT: 12.7 % (ref 11.5–17)
EST. AVERAGE GLUCOSE BLD GHB EST-MCNC: 119.8 MG/DL
GFR SERPLBLD CREATININE-BSD FMLA CKD-EPI: >60 MLS/MIN/1.73/M2
GLOBULIN SER-MCNC: 3.8 GM/DL (ref 2.4–3.5)
GLUCOSE SERPL-MCNC: 103 MG/DL (ref 74–100)
HBA1C MFR BLD: 5.8 %
HCT VFR BLD AUTO: 45.4 % (ref 37–47)
HDLC SERPL-MCNC: 37 MG/DL (ref 35–60)
HGB BLD-MCNC: 14.4 GM/DL (ref 12–16)
IMM GRANULOCYTES # BLD AUTO: 0.01 X10(3)/MCL (ref 0–0.04)
IMM GRANULOCYTES NFR BLD AUTO: 0.1 %
LDLC SERPL CALC-MCNC: 104 MG/DL (ref 50–140)
LYMPHOCYTES # BLD AUTO: 3.01 X10(3)/MCL (ref 0.6–4.6)
LYMPHOCYTES NFR BLD AUTO: 42.9 %
MCH RBC QN AUTO: 28 PG
MCHC RBC AUTO-ENTMCNC: 31.7 MG/DL (ref 33–36)
MCV RBC AUTO: 88.2 FL (ref 80–94)
MICROALBUMIN UR-MCNC: 35.1 UG/ML
MICROALBUMIN/CREAT RATIO PNL UR: 14.5 MG/GM CR (ref 0–30)
MONOCYTES # BLD AUTO: 0.57 X10(3)/MCL (ref 0.1–1.3)
MONOCYTES NFR BLD AUTO: 8.1 %
NEUTROPHILS # BLD AUTO: 3.3 X10(3)/MCL (ref 2.1–9.2)
NEUTROPHILS NFR BLD AUTO: 47 %
PLATELET # BLD AUTO: 281 X10(3)/MCL (ref 130–400)
PMV BLD AUTO: 11 FL (ref 7.4–10.4)
POTASSIUM SERPL-SCNC: 3.8 MMOL/L (ref 3.5–5.1)
PROT SERPL-MCNC: 7.4 GM/DL (ref 6.4–8.3)
RBC # BLD AUTO: 5.15 X10(6)/MCL (ref 4.2–5.4)
SODIUM SERPL-SCNC: 141 MMOL/L (ref 136–145)
TRIGL SERPL-MCNC: 109 MG/DL (ref 37–140)
TSH SERPL-ACNC: 2.03 UIU/ML (ref 0.35–4.94)
VLDLC SERPL CALC-MCNC: 22 MG/DL
WBC # SPEC AUTO: 7 X10(3)/MCL (ref 4.5–11.5)

## 2023-02-01 PROCEDURE — 85025 COMPLETE CBC W/AUTO DIFF WBC: CPT

## 2023-02-01 PROCEDURE — 80053 COMPREHEN METABOLIC PANEL: CPT

## 2023-02-01 PROCEDURE — 84443 ASSAY THYROID STIM HORMONE: CPT

## 2023-02-01 PROCEDURE — 83036 HEMOGLOBIN GLYCOSYLATED A1C: CPT

## 2023-02-01 PROCEDURE — 82306 VITAMIN D 25 HYDROXY: CPT

## 2023-02-01 PROCEDURE — 82043 UR ALBUMIN QUANTITATIVE: CPT

## 2023-02-01 PROCEDURE — 80061 LIPID PANEL: CPT

## 2023-02-01 PROCEDURE — 36415 COLL VENOUS BLD VENIPUNCTURE: CPT

## 2023-02-02 ENCOUNTER — OFFICE VISIT (OUTPATIENT)
Dept: FAMILY MEDICINE | Facility: CLINIC | Age: 28
End: 2023-02-02
Payer: MEDICAID

## 2023-02-02 VITALS
BODY MASS INDEX: 50.02 KG/M2 | TEMPERATURE: 98 F | HEIGHT: 64 IN | RESPIRATION RATE: 18 BRPM | WEIGHT: 293 LBS | DIASTOLIC BLOOD PRESSURE: 78 MMHG | HEART RATE: 79 BPM | SYSTOLIC BLOOD PRESSURE: 122 MMHG | OXYGEN SATURATION: 99 %

## 2023-02-02 DIAGNOSIS — I10 HYPERTENSION, UNSPECIFIED TYPE: ICD-10-CM

## 2023-02-02 DIAGNOSIS — R79.89 ELEVATED LFTS: Primary | ICD-10-CM

## 2023-02-02 DIAGNOSIS — Z00.00 WELLNESS EXAMINATION: ICD-10-CM

## 2023-02-02 DIAGNOSIS — E55.9 VITAMIN D DEFICIENCY: ICD-10-CM

## 2023-02-02 DIAGNOSIS — Z00.00 ENCOUNTER FOR WELLNESS EXAMINATION: ICD-10-CM

## 2023-02-02 DIAGNOSIS — I47.10 SVT (SUPRAVENTRICULAR TACHYCARDIA): ICD-10-CM

## 2023-02-02 DIAGNOSIS — F32.A DEPRESSION, UNSPECIFIED DEPRESSION TYPE: ICD-10-CM

## 2023-02-02 DIAGNOSIS — E66.01 MORBID OBESITY: ICD-10-CM

## 2023-02-02 PROCEDURE — 3008F BODY MASS INDEX DOCD: CPT | Mod: CPTII,,, | Performed by: NURSE PRACTITIONER

## 2023-02-02 PROCEDURE — 99213 OFFICE O/P EST LOW 20 MIN: CPT | Mod: 25,,, | Performed by: NURSE PRACTITIONER

## 2023-02-02 PROCEDURE — 3066F PR DOCUMENTATION OF TREATMENT FOR NEPHROPATHY: ICD-10-PCS | Mod: CPTII,,, | Performed by: NURSE PRACTITIONER

## 2023-02-02 PROCEDURE — 1159F MED LIST DOCD IN RCRD: CPT | Mod: CPTII,,, | Performed by: NURSE PRACTITIONER

## 2023-02-02 PROCEDURE — 3074F SYST BP LT 130 MM HG: CPT | Mod: CPTII,,, | Performed by: NURSE PRACTITIONER

## 2023-02-02 PROCEDURE — 3008F PR BODY MASS INDEX (BMI) DOCUMENTED: ICD-10-PCS | Mod: CPTII,,, | Performed by: NURSE PRACTITIONER

## 2023-02-02 PROCEDURE — 3060F POS MICROALBUMINURIA REV: CPT | Mod: CPTII,,, | Performed by: NURSE PRACTITIONER

## 2023-02-02 PROCEDURE — 99213 PR OFFICE/OUTPT VISIT, EST, LEVL III, 20-29 MIN: ICD-10-PCS | Mod: 25,,, | Performed by: NURSE PRACTITIONER

## 2023-02-02 PROCEDURE — 99395 PR PREVENTIVE VISIT,EST,18-39: ICD-10-PCS | Mod: ,,, | Performed by: NURSE PRACTITIONER

## 2023-02-02 PROCEDURE — 99395 PREV VISIT EST AGE 18-39: CPT | Mod: ,,, | Performed by: NURSE PRACTITIONER

## 2023-02-02 PROCEDURE — 3074F PR MOST RECENT SYSTOLIC BLOOD PRESSURE < 130 MM HG: ICD-10-PCS | Mod: CPTII,,, | Performed by: NURSE PRACTITIONER

## 2023-02-02 PROCEDURE — 1159F PR MEDICATION LIST DOCUMENTED IN MEDICAL RECORD: ICD-10-PCS | Mod: CPTII,,, | Performed by: NURSE PRACTITIONER

## 2023-02-02 PROCEDURE — 3078F PR MOST RECENT DIASTOLIC BLOOD PRESSURE < 80 MM HG: ICD-10-PCS | Mod: CPTII,,, | Performed by: NURSE PRACTITIONER

## 2023-02-02 PROCEDURE — 3066F NEPHROPATHY DOC TX: CPT | Mod: CPTII,,, | Performed by: NURSE PRACTITIONER

## 2023-02-02 PROCEDURE — 3078F DIAST BP <80 MM HG: CPT | Mod: CPTII,,, | Performed by: NURSE PRACTITIONER

## 2023-02-02 PROCEDURE — 3060F PR POS MICROALBUMINURIA RESULT DOCUMENTED/REVIEW: ICD-10-PCS | Mod: CPTII,,, | Performed by: NURSE PRACTITIONER

## 2023-02-02 RX ORDER — ASPIRIN 81 MG/1
81 TABLET ORAL DAILY
Qty: 30 TABLET | Refills: 11 | Status: SHIPPED | OUTPATIENT
Start: 2023-02-02 | End: 2023-06-07 | Stop reason: SDUPTHER

## 2023-02-02 RX ORDER — ESCITALOPRAM OXALATE 10 MG/1
10 TABLET ORAL DAILY
Qty: 30 TABLET | Refills: 11 | Status: SHIPPED | OUTPATIENT
Start: 2023-02-02 | End: 2023-03-08 | Stop reason: SDUPTHER

## 2023-02-02 RX ORDER — ASPIRIN 325 MG
50000 TABLET, DELAYED RELEASE (ENTERIC COATED) ORAL WEEKLY
Qty: 12 CAPSULE | Refills: 0 | Status: SHIPPED | OUTPATIENT
Start: 2023-02-02 | End: 2023-03-08 | Stop reason: SDUPTHER

## 2023-02-02 RX ORDER — LEVOCETIRIZINE DIHYDROCHLORIDE 5 MG/1
5 TABLET, FILM COATED ORAL NIGHTLY
Qty: 30 TABLET | Refills: 11 | Status: SHIPPED | OUTPATIENT
Start: 2023-02-02 | End: 2023-11-09

## 2023-02-02 RX ORDER — METOPROLOL TARTRATE 25 MG/1
25 TABLET, FILM COATED ORAL 2 TIMES DAILY
Qty: 60 TABLET | Refills: 11 | Status: SHIPPED | OUTPATIENT
Start: 2023-02-02 | End: 2023-06-07 | Stop reason: SDUPTHER

## 2023-02-02 NOTE — PROGRESS NOTES
ANNUAL WELLNESS NOTE    Chief Complaint:  Annual Exam     HPI:  Esthela Maddox is a 27 y.o. female presents to the clinic for a wellness exam lab review.  Patient reports that she was contacted by mental health but did not make any of the appointments.  Patient reports depression symptoms are still present.  Denies any suicidal or homicidal ideations.  Symptoms are triggered due to recent break-up and history of PTSD.    ----------------------------  Hypertension  Morbid obesity  SVT (supraventricular tachycardia)    Patient Care Team:  GILDARDO Oliva as PCP - General (Nurse Practitioner)    Nursing Assessments and Health Risk Assessment:  No flowsheet data found.  Fall Risk Assessment - Outpatient 2/2/2023 8/1/2022   Mobility Status Ambulatory Ambulatory   Number of falls 0 0   Identified as fall risk 0 0     Review of Systems   Constitutional: Negative.    HENT: Negative.     Eyes: Negative.    Respiratory: Negative.     Cardiovascular: Negative.    Gastrointestinal: Negative.    Endocrine: Negative.    Genitourinary: Negative.    Musculoskeletal: Negative.    Integumentary:  Negative.   Allergic/Immunologic: Negative.    Neurological: Negative.    Hematological: Negative.    Psychiatric/Behavioral:  Positive for dysphoric mood. The patient is nervous/anxious.      Physical Exam  Constitutional:       Appearance: Normal appearance. She is obese.   HENT:      Head: Normocephalic.      Right Ear: Tympanic membrane, ear canal and external ear normal.      Left Ear: Tympanic membrane, ear canal and external ear normal.      Nose: Nose normal.      Mouth/Throat:      Mouth: Mucous membranes are moist.      Pharynx: Oropharynx is clear.   Eyes:      Extraocular Movements: Extraocular movements intact.      Conjunctiva/sclera: Conjunctivae normal.      Pupils: Pupils are equal, round, and reactive to light.   Cardiovascular:      Rate and Rhythm: Normal rate and regular rhythm.      Pulses: Normal pulses.       Heart sounds: Normal heart sounds.   Pulmonary:      Effort: Pulmonary effort is normal.      Breath sounds: Normal breath sounds.   Abdominal:      General: Bowel sounds are normal.      Palpations: Abdomen is soft.   Musculoskeletal:         General: Normal range of motion.      Cervical back: Normal range of motion and neck supple.   Skin:     General: Skin is warm and dry.   Neurological:      General: No focal deficit present.      Mental Status: She is alert and oriented to person, place, and time. Mental status is at baseline.   Psychiatric:         Mood and Affect: Mood normal. Affect is tearful.         Behavior: Behavior normal.         Thought Content: Thought content normal.         Judgment: Judgment normal.     Results for orders placed or performed in visit on 02/01/23   TSH   Result Value Ref Range    Thyroid Stimulating Hormone 2.026 0.350 - 4.940 uIU/mL   Lipid Panel   Result Value Ref Range    Cholesterol Total 163 <=200 mg/dL    HDL Cholesterol 37 35 - 60 mg/dL    Triglyceride 109 37 - 140 mg/dL    Cholesterol/HDL Ratio 4 0 - 5    Very Low Density Lipoprotein 22     LDL Cholesterol 104.00 50.00 - 140.00 mg/dL   Hemoglobin A1C   Result Value Ref Range    Hemoglobin A1c 5.8 <=7.0 %    Estimated Average Glucose 119.8 mg/dL   Vitamin D   Result Value Ref Range    Vit D 25 OH 5.4 (L) 30.0 - 80.0 ng/mL   Comprehensive Metabolic Panel   Result Value Ref Range    Sodium Level 141 136 - 145 mmol/L    Potassium Level 3.8 3.5 - 5.1 mmol/L    Chloride 104 98 - 107 mmol/L    Carbon Dioxide 28 22 - 29 mmol/L    Glucose Level 103 (H) 74 - 100 mg/dL    Blood Urea Nitrogen 6.3 (L) 7.0 - 18.7 mg/dL    Creatinine 0.79 0.55 - 1.02 mg/dL    Calcium Level Total 9.6 8.4 - 10.2 mg/dL    Protein Total 7.4 6.4 - 8.3 gm/dL    Albumin Level 3.6 3.5 - 5.0 g/dL    Globulin 3.8 (H) 2.4 - 3.5 gm/dL    Albumin/Globulin Ratio 0.9 (L) 1.1 - 2.0 ratio    Bilirubin Total 0.6 <=1.5 mg/dL    Alkaline Phosphatase 99 40 - 150  unit/L    Alanine Aminotransferase 71 (H) 0 - 55 unit/L    Aspartate Aminotransferase 49 (H) 5 - 34 unit/L    eGFR >60 mls/min/1.73/m2   Microalbumin/Creatinine Ratio, Urine   Result Value Ref Range    Urine Microalbumin 35.1 (H) <=30.0 ug/ml    Urine Creatinine 241.3 (H) 47.0 - 110.0 mg/dL    Microalbumin Creatinine Ratio 14.5 0.0 - 30.0 mg/gm Cr   CBC with Differential   Result Value Ref Range    WBC 7.0 4.5 - 11.5 x10(3)/mcL    RBC 5.15 4.20 - 5.40 x10(6)/mcL    Hgb 14.4 12.0 - 16.0 gm/dL    Hct 45.4 37.0 - 47.0 %    MCV 88.2 80.0 - 94.0 fL    MCH 28.0 pg    MCHC 31.7 (L) 33.0 - 36.0 mg/dL    RDW 12.7 11.5 - 17.0 %    Platelet 281 130 - 400 x10(3)/mcL    MPV 11.0 (H) 7.4 - 10.4 fL    Neut % 47.0 %    Lymph % 42.9 %    Mono % 8.1 %    Eos % 1.6 %    Basophil % 0.3 %    Lymph # 3.01 0.6 - 4.6 x10(3)/mcL    Neut # 3.30 2.1 - 9.2 x10(3)/mcL    Mono # 0.57 0.1 - 1.3 x10(3)/mcL    Eos # 0.11 0 - 0.9 x10(3)/mcL    Baso # 0.02 0 - 0.2 x10(3)/mcL    IG# 0.01 0 - 0.04 x10(3)/mcL    IG% 0.1 %         Health Maintenance:  Health Maintenance Topics with due status: Not Due       Topic Last Completion Date    Hemoglobin A1c (Prediabetes) 02/01/2023     Health Maintenance Due   Topic Date Due    COVID-19 Vaccine (1) Never done    Pap Smear  Never done    TETANUS VACCINE  03/12/2022    Influenza Vaccine (1) Never done       Assessment/Plan:  1. Elevated LFTs  -     US Abdomen Complete; Future; Expected date: 02/02/2023    2. Encounter for wellness examination    3. Hypertension, unspecified type  -     metoprolol tartrate (LOPRESSOR) 25 MG tablet; Take 1 tablet (25 mg total) by mouth 2 (two) times daily.  Dispense: 60 tablet; Refill: 11  -     aspirin (ECOTRIN) 81 MG EC tablet; Take 1 tablet (81 mg total) by mouth once daily.  Dispense: 30 tablet; Refill: 11    4. Wellness examination    5. Depression, unspecified depression type  Assessment & Plan:  Not at goal.  Lexapro 10 mg p.o. daily sent to pharmacy.  Report to ED with any  suicidal or homicidal ideations.  Return to clinic in 1 month for follow-up appointment.      6. SVT (supraventricular tachycardia)  Assessment & Plan:  Stable.  Keep scheduled follow-up appointments with cardiology.  ED precautions given.      7. Morbid obesity  Assessment & Plan:  Increase physical activity to at least 30 minutes a day most days of the week as tolerated.      8. Vitamin D deficiency  Assessment & Plan:  Vitamin D3 80702 IU p.o. q.week x12 weeks sent to pharmacy.      Other orders  -     EScitalopram oxalate (LEXAPRO) 10 MG tablet; Take 1 tablet (10 mg total) by mouth once daily.  Dispense: 30 tablet; Refill: 11  -     levocetirizine (XYZAL) 5 MG tablet; Take 1 tablet (5 mg total) by mouth every evening.  Dispense: 30 tablet; Refill: 11  -     cholecalciferol, vitamin D3, 1,250 mcg (50,000 unit) capsule; Take 1 capsule (50,000 Units total) by mouth once a week. for 12 doses  Dispense: 12 capsule; Refill: 0        Return to clinic as needed and in 1 month for follow-up appointment.

## 2023-02-15 ENCOUNTER — TELEPHONE (OUTPATIENT)
Dept: FAMILY MEDICINE | Facility: CLINIC | Age: 28
End: 2023-02-15
Payer: MEDICAID

## 2023-02-15 NOTE — TELEPHONE ENCOUNTER
Spoke with pt.    ----- Message from July Cowan sent at 2/15/2023  1:05 PM CST -----  Regarding: Mediation Question for Nurse  Please call patient back to talk about medication   [phone]

## 2023-02-16 ENCOUNTER — HOSPITAL ENCOUNTER (OUTPATIENT)
Dept: RADIOLOGY | Facility: HOSPITAL | Age: 28
Discharge: HOME | End: 2023-02-16
Attending: NURSE PRACTITIONER
Payer: MEDICAID

## 2023-02-16 DIAGNOSIS — R79.89 ELEVATED LFTS: Primary | ICD-10-CM

## 2023-02-16 DIAGNOSIS — R79.89 ELEVATED LFTS: ICD-10-CM

## 2023-02-16 PROCEDURE — 76700 US EXAM ABDOM COMPLETE: CPT | Mod: TC

## 2023-02-17 NOTE — ASSESSMENT & PLAN NOTE
Not at goal.  Lexapro 10 mg p.o. daily sent to pharmacy.  Report to ED with any suicidal or homicidal ideations.  Return to clinic in 1 month for follow-up appointment.

## 2023-03-08 ENCOUNTER — OFFICE VISIT (OUTPATIENT)
Dept: FAMILY MEDICINE | Facility: CLINIC | Age: 28
End: 2023-03-08
Payer: MEDICAID

## 2023-03-08 VITALS
HEIGHT: 64 IN | RESPIRATION RATE: 18 BRPM | HEART RATE: 80 BPM | DIASTOLIC BLOOD PRESSURE: 82 MMHG | BODY MASS INDEX: 50.02 KG/M2 | SYSTOLIC BLOOD PRESSURE: 112 MMHG | OXYGEN SATURATION: 98 % | WEIGHT: 293 LBS | TEMPERATURE: 98 F

## 2023-03-08 DIAGNOSIS — E55.9 VITAMIN D DEFICIENCY: ICD-10-CM

## 2023-03-08 DIAGNOSIS — F32.A DEPRESSION, UNSPECIFIED DEPRESSION TYPE: Primary | ICD-10-CM

## 2023-03-08 DIAGNOSIS — E66.01 MORBID OBESITY: ICD-10-CM

## 2023-03-08 PROCEDURE — 3066F NEPHROPATHY DOC TX: CPT | Mod: CPTII,,, | Performed by: NURSE PRACTITIONER

## 2023-03-08 PROCEDURE — 3074F SYST BP LT 130 MM HG: CPT | Mod: CPTII,,, | Performed by: NURSE PRACTITIONER

## 2023-03-08 PROCEDURE — 1159F PR MEDICATION LIST DOCUMENTED IN MEDICAL RECORD: ICD-10-PCS | Mod: CPTII,,, | Performed by: NURSE PRACTITIONER

## 2023-03-08 PROCEDURE — 3008F PR BODY MASS INDEX (BMI) DOCUMENTED: ICD-10-PCS | Mod: CPTII,,, | Performed by: NURSE PRACTITIONER

## 2023-03-08 PROCEDURE — 99213 PR OFFICE/OUTPT VISIT, EST, LEVL III, 20-29 MIN: ICD-10-PCS | Mod: ,,, | Performed by: NURSE PRACTITIONER

## 2023-03-08 PROCEDURE — 3079F PR MOST RECENT DIASTOLIC BLOOD PRESSURE 80-89 MM HG: ICD-10-PCS | Mod: CPTII,,, | Performed by: NURSE PRACTITIONER

## 2023-03-08 PROCEDURE — 3008F BODY MASS INDEX DOCD: CPT | Mod: CPTII,,, | Performed by: NURSE PRACTITIONER

## 2023-03-08 PROCEDURE — 3079F DIAST BP 80-89 MM HG: CPT | Mod: CPTII,,, | Performed by: NURSE PRACTITIONER

## 2023-03-08 PROCEDURE — 3060F PR POS MICROALBUMINURIA RESULT DOCUMENTED/REVIEW: ICD-10-PCS | Mod: CPTII,,, | Performed by: NURSE PRACTITIONER

## 2023-03-08 PROCEDURE — 1159F MED LIST DOCD IN RCRD: CPT | Mod: CPTII,,, | Performed by: NURSE PRACTITIONER

## 2023-03-08 PROCEDURE — 3066F PR DOCUMENTATION OF TREATMENT FOR NEPHROPATHY: ICD-10-PCS | Mod: CPTII,,, | Performed by: NURSE PRACTITIONER

## 2023-03-08 PROCEDURE — 3074F PR MOST RECENT SYSTOLIC BLOOD PRESSURE < 130 MM HG: ICD-10-PCS | Mod: CPTII,,, | Performed by: NURSE PRACTITIONER

## 2023-03-08 PROCEDURE — 3060F POS MICROALBUMINURIA REV: CPT | Mod: CPTII,,, | Performed by: NURSE PRACTITIONER

## 2023-03-08 PROCEDURE — 99213 OFFICE O/P EST LOW 20 MIN: CPT | Mod: ,,, | Performed by: NURSE PRACTITIONER

## 2023-03-08 RX ORDER — ESCITALOPRAM OXALATE 5 MG/1
5 TABLET ORAL DAILY
Qty: 30 TABLET | Refills: 11 | Status: SHIPPED | OUTPATIENT
Start: 2023-03-08 | End: 2023-11-09

## 2023-03-08 RX ORDER — ASPIRIN 325 MG
50000 TABLET, DELAYED RELEASE (ENTERIC COATED) ORAL WEEKLY
Qty: 12 CAPSULE | Refills: 0 | Status: SHIPPED | OUTPATIENT
Start: 2023-03-08 | End: 2023-05-25

## 2023-03-08 NOTE — PROGRESS NOTES
"   Patient ID: [id number]     Chief Complaint: Depression (1 mo fu)      HPI:     Esthela Maddox is a 27 y.o. female here today for a one-month follow up for depression.  Patient reports symptoms are controlled with Lexapro 5 mg p.o. daily.  Denies any suicidal homicidal ideations.  No other complaints today.       Past Medical History:  has a past medical history of Hypertension, Morbid obesity, and SVT (supraventricular tachycardia).    Surgical History:  has no past surgical history on file.    Family History: family history is not on file.    Social History:  reports that she has never smoked. She has never used smokeless tobacco. She reports that she does not currently use alcohol. She reports current drug use. Frequency: 2.00 times per week. Drug: Marijuana.    Current Outpatient Medications   Medication Instructions    aspirin (ECOTRIN) 81 mg, Oral, Daily    cholecalciferol (vitamin D3) 50,000 Units, Oral, Weekly    EScitalopram oxalate (LEXAPRO) 5 mg, Oral, Daily    levocetirizine (XYZAL) 5 mg, Oral, Nightly    metoprolol tartrate (LOPRESSOR) 25 mg, Oral, 2 times daily       Patient has No Known Allergies.     Patient Care Team:  GILDARDO Oliva as PCP - General (Nurse Practitioner)       Subjective:     Review of Systems    12 point review of systems conducted, negative except as stated in the history of present illness. See HPI for details.      Objective:     Visit Vitals  /82 (BP Location: Left arm, Patient Position: Sitting)   Pulse 80   Temp 98 °F (36.7 °C) (Oral)   Resp 18   Ht 5' 4" (1.626 m)   Wt (!) 186 kg (410 lb 1.6 oz)   LMP 02/15/2023 (Within Weeks)   SpO2 98%   BMI 70.39 kg/m²       Physical Exam    General: Alert and oriented, No acute distress.  Head: Normocephalic, Atraumatic.  Eye: Pupils are equal, round and reactive to light, Extraocular movements are intact, Sclera non-icteric.  Ears/Nose/Throat: Normal, Mucosa moist,Clear.  Neck/Thyroid: Supple, Non-tender, No carotid " bruit, No lymphadenopathy, No JVD, Full range of motion.  Respiratory: Clear to auscultation bilaterally; No wheezes, rales or rhonchi,Non-labored respirations, Symmetrical chest wall expansion.  Cardiovascular: Regular rate and rhythm, S1/S2 normal, No murmurs, rubs or gallops.  Gastrointestinal: Soft, Non-tender, Non-distended, Normal bowel sounds, No palpable organomegaly.  Musculoskeletal: Normal range of motion.  Integumentary: Warm, Dry, Intact, No suspicious lesions or rashes.  Extremities: No clubbing, cyanosis or edema  Neurologic: No focal deficits, Cranial Nerves II-XII are grossly intact, Motor strength normal upper and lower extremities, Sensory exam intact.  Psychiatric: Normal interaction, Coherent speech, Euthymic mood, Appropriate affect     Labs Reviewed:     Chemistry:  Lab Results   Component Value Date     02/01/2023    K 3.8 02/01/2023    CHLORIDE 104 02/01/2023    BUN 6.3 (L) 02/01/2023    CREATININE 0.79 02/01/2023    EGFRNORACEVR >60 02/01/2023    GLUCOSE 103 (H) 02/01/2023    CALCIUM 9.6 02/01/2023    ALKPHOS 99 02/01/2023    LABPROT 7.4 02/01/2023    ALBUMIN 3.6 02/01/2023    BILIDIR 0.2 05/11/2022    IBILI 0.30 05/11/2022    AST 49 (H) 02/01/2023    ALT 71 (H) 02/01/2023    MG 2.10 07/01/2022    [id number] 5.4 (L) 02/01/2023    TSH 2.026 02/01/2023        Lab Results   Component Value Date    HGBA1C 5.8 02/01/2023        Hematology:  Lab Results   Component Value Date    WBC 7.0 02/01/2023    HGB 14.4 02/01/2023    HCT 45.4 02/01/2023     02/01/2023       Lipid Panel:  Lab Results   Component Value Date    CHOL 163 02/01/2023    HDL 37 02/01/2023    .00 02/01/2023    TRIG 109 02/01/2023    TOTALCHOLEST 4 02/01/2023        Urine:  Lab Results   Component Value Date    COLORUA Yellow 05/11/2022    APPEARANCEUA Clear 05/11/2022    SGUA >=1.030 05/11/2022    PHUA 5.5 05/11/2022    PROTEINUA Negative 05/11/2022    GLUCOSEUA Negative 05/11/2022    KETONESUA Negative  05/11/2022    BLOODUA Negative 05/11/2022    NITRITESUA Negative 05/11/2022    LEUKOCYTESUR Trace (A) 05/11/2022    RBCUA None Seen 05/11/2022    WBCUA 0-2 05/11/2022    BACTERIA Moderate (A) 05/11/2022    CREATRANDUR 241.3 (H) 02/01/2023          Assessment:         Plan:   1. Depression, unspecified depression type  Controlled.  Continue Lexapro 5 mg p.o. daily.  Report to ED with any suicidal or homicidal ideations.  - EScitalopram oxalate (LEXAPRO) 5 MG Tab; Take 1 tablet (5 mg total) by mouth once daily.  Dispense: 30 tablet; Refill: 11    2. Vitamin D deficiency  - cholecalciferol, vitamin D3, 1,250 mcg (50,000 unit) capsule; Take 1 capsule (50,000 Units total) by mouth once a week. for 12 doses  Dispense: 12 capsule; Refill: 0    3. Morbid obesity  Increase physical activity to at least 30 minutes a day most days of the week as tolerated.    Follow up in about 6 months (around 9/8/2023) for Anxiety/Depression. In addition to their scheduled follow up, the patient has also been instructed to follow up on as needed basis.     Future Appointments   Date Time Provider Department Center   5/23/2023  9:15 AM Benjamin Porter MD [facility]   8/2/2023 10:10 AM DALTON Jj [facility] GYN [place]   9/12/2023 10:00 AM GILDARDO Oliva [facility]        GILDARDO Oliva

## 2023-05-08 PROBLEM — Z00.00 WELLNESS EXAMINATION: Status: RESOLVED | Noted: 2023-02-02 | Resolved: 2023-05-08

## 2023-05-22 ENCOUNTER — HOSPITAL ENCOUNTER (EMERGENCY)
Facility: HOSPITAL | Age: 28
Discharge: HOME | End: 2023-05-22
Attending: SPECIALIST
Payer: MEDICAID

## 2023-05-22 VITALS
OXYGEN SATURATION: 99 % | HEIGHT: 64 IN | SYSTOLIC BLOOD PRESSURE: 147 MMHG | DIASTOLIC BLOOD PRESSURE: 92 MMHG | BODY MASS INDEX: 50.02 KG/M2 | HEART RATE: 84 BPM | RESPIRATION RATE: 19 BRPM | TEMPERATURE: 99 F | WEIGHT: 293 LBS

## 2023-05-22 DIAGNOSIS — R07.9 CHEST PAIN: ICD-10-CM

## 2023-05-22 DIAGNOSIS — R07.89 CHEST WALL PAIN: Primary | ICD-10-CM

## 2023-05-22 LAB
ALBUMIN SERPL-MCNC: 3.4 G/DL (ref 3.5–5)
ALBUMIN/GLOB SERPL: 0.9 RATIO (ref 1.1–2)
ALP SERPL-CCNC: 86 UNIT/L (ref 40–150)
ALT SERPL-CCNC: 41 UNIT/L (ref 0–55)
AST SERPL-CCNC: 22 UNIT/L (ref 5–34)
B-HCG SERPL QL: NEGATIVE
BASOPHILS # BLD AUTO: 0.04 X10(3)/MCL
BASOPHILS NFR BLD AUTO: 0.5 %
BILIRUBIN DIRECT+TOT PNL SERPL-MCNC: 0.2 MG/DL
BUN SERPL-MCNC: 10.8 MG/DL (ref 7–18.7)
CALCIUM SERPL-MCNC: 9.3 MG/DL (ref 8.4–10.2)
CHLORIDE SERPL-SCNC: 104 MMOL/L (ref 98–107)
CO2 SERPL-SCNC: 25 MMOL/L (ref 22–29)
CREAT SERPL-MCNC: 0.71 MG/DL (ref 0.55–1.02)
EOSINOPHIL # BLD AUTO: 0.14 X10(3)/MCL (ref 0–0.9)
EOSINOPHIL NFR BLD AUTO: 1.9 %
ERYTHROCYTE [DISTWIDTH] IN BLOOD BY AUTOMATED COUNT: 12.7 % (ref 11.5–17)
GFR SERPLBLD CREATININE-BSD FMLA CKD-EPI: >60 MLS/MIN/1.73/M2
GLOBULIN SER-MCNC: 3.9 GM/DL (ref 2.4–3.5)
GLUCOSE SERPL-MCNC: 96 MG/DL (ref 74–100)
HCT VFR BLD AUTO: 42.7 % (ref 37–47)
HGB BLD-MCNC: 13.6 G/DL (ref 12–16)
IMM GRANULOCYTES # BLD AUTO: 0.01 X10(3)/MCL (ref 0–0.04)
IMM GRANULOCYTES NFR BLD AUTO: 0.1 %
LYMPHOCYTES # BLD AUTO: 2.62 X10(3)/MCL (ref 0.6–4.6)
LYMPHOCYTES NFR BLD AUTO: 35.7 %
MCH RBC QN AUTO: 28.5 PG (ref 27–31)
MCHC RBC AUTO-ENTMCNC: 31.9 G/DL (ref 33–36)
MCV RBC AUTO: 89.5 FL (ref 80–94)
MONOCYTES # BLD AUTO: 0.61 X10(3)/MCL (ref 0.1–1.3)
MONOCYTES NFR BLD AUTO: 8.3 %
NEUTROPHILS # BLD AUTO: 3.92 X10(3)/MCL (ref 2.1–9.2)
NEUTROPHILS NFR BLD AUTO: 53.5 %
PLATELET # BLD AUTO: 269 X10(3)/MCL (ref 130–400)
PMV BLD AUTO: 10.9 FL (ref 7.4–10.4)
POC CARDIAC TROPONIN I: 0 NG/ML (ref 0–0.08)
POTASSIUM SERPL-SCNC: 3.9 MMOL/L (ref 3.5–5.1)
PROT SERPL-MCNC: 7.3 GM/DL (ref 6.4–8.3)
RBC # BLD AUTO: 4.77 X10(6)/MCL (ref 4.2–5.4)
SAMPLE: NORMAL
SODIUM SERPL-SCNC: 139 MMOL/L (ref 136–145)
WBC # SPEC AUTO: 7.34 X10(3)/MCL (ref 4.5–11.5)

## 2023-05-22 PROCEDURE — 80053 COMPREHEN METABOLIC PANEL: CPT | Performed by: NURSE PRACTITIONER

## 2023-05-22 PROCEDURE — 96374 THER/PROPH/DIAG INJ IV PUSH: CPT

## 2023-05-22 PROCEDURE — 81025 URINE PREGNANCY TEST: CPT | Performed by: NURSE PRACTITIONER

## 2023-05-22 PROCEDURE — 99285 EMERGENCY DEPT VISIT HI MDM: CPT | Mod: 25

## 2023-05-22 PROCEDURE — 93005 ELECTROCARDIOGRAM TRACING: CPT

## 2023-05-22 PROCEDURE — 84484 ASSAY OF TROPONIN QUANT: CPT

## 2023-05-22 PROCEDURE — 85025 COMPLETE CBC W/AUTO DIFF WBC: CPT | Performed by: NURSE PRACTITIONER

## 2023-05-22 PROCEDURE — 63600175 PHARM REV CODE 636 W HCPCS: Performed by: NURSE PRACTITIONER

## 2023-05-22 RX ORDER — KETOROLAC TROMETHAMINE 30 MG/ML
15 INJECTION, SOLUTION INTRAMUSCULAR; INTRAVENOUS
Status: COMPLETED | OUTPATIENT
Start: 2023-05-22 | End: 2023-05-22

## 2023-05-22 RX ORDER — DICLOFENAC SODIUM 50 MG/1
50 TABLET, DELAYED RELEASE ORAL 3 TIMES DAILY PRN
Qty: 21 TABLET | Refills: 0 | Status: SHIPPED | OUTPATIENT
Start: 2023-05-22 | End: 2023-05-29

## 2023-05-22 RX ADMIN — KETOROLAC TROMETHAMINE 15 MG: 30 INJECTION, SOLUTION INTRAMUSCULAR; INTRAVENOUS at 08:05

## 2023-05-22 NOTE — Clinical Note
"Esthela Bellafany" Varsha was seen and treated in our emergency department on 5/22/2023.  She may return to work on 05/24/2023.       If you have any questions or concerns, please don't hesitate to call.      GILDARDO Jordan"

## 2023-05-23 NOTE — ED PROVIDER NOTES
Encounter Date: 5/22/2023       History     Chief Complaint   Patient presents with    Chest Pain     Pt c/o reproducible midsternal CP that started around 1600; reports pain increases with palpation. Pt states she feels dizzy and SOB.      27 year old female presents to ER via EMS complaining of midsternal chest pain with nausea and mild shortness of breath. She states pain started around 1600. She does report dizziness but no fever, cough, congestion or vomiting.     The history is provided by the patient and the EMS personnel. No  was used.   Review of patient's allergies indicates:  No Known Allergies  Past Medical History:   Diagnosis Date    Hypertension     Morbid obesity     SVT (supraventricular tachycardia)      No past surgical history on file.  No family history on file.  Social History     Tobacco Use    Smoking status: Never    Smokeless tobacco: Never   Substance Use Topics    Alcohol use: Not Currently     Comment: ocass    Drug use: Yes     Frequency: 2.0 times per week     Types: Marijuana     Review of Systems   Constitutional:  Negative for chills and fever.   Respiratory:  Positive for shortness of breath. Negative for cough.    Cardiovascular:  Positive for chest pain. Negative for palpitations.   Gastrointestinal:  Positive for nausea. Negative for abdominal pain and vomiting.   Neurological:  Positive for dizziness. Negative for syncope.   All other systems reviewed and are negative.    Physical Exam     Initial Vitals [05/22/23 1919]   BP Pulse Resp Temp SpO2   (!) 149/91 96 20 98.6 °F (37 °C) 100 %      MAP       --         Physical Exam    Constitutional: She appears well-developed and well-nourished.   HENT:   Head: Normocephalic.   Eyes: EOM are normal.   Neck: Neck supple.   Cardiovascular:  Normal rate, regular rhythm and normal heart sounds.           Pulmonary/Chest: Breath sounds normal. No respiratory distress.   Abdominal: Abdomen is soft. There is no abdominal  tenderness.   Musculoskeletal:         General: Normal range of motion.      Cervical back: Neck supple.     Neurological: She is alert and oriented to person, place, and time.   Skin: Skin is warm and dry. Capillary refill takes less than 2 seconds.   Psychiatric: She has a normal mood and affect.       ED Course   Procedures  Labs Reviewed   COMPREHENSIVE METABOLIC PANEL - Abnormal; Notable for the following components:       Result Value    Albumin Level 3.4 (*)     Globulin 3.9 (*)     Albumin/Globulin Ratio 0.9 (*)     All other components within normal limits   CBC WITH DIFFERENTIAL - Abnormal; Notable for the following components:    MCHC 31.9 (*)     MPV 10.9 (*)     All other components within normal limits   PREGNANCY TEST, URINE RAPID - Normal   CBC W/ AUTO DIFFERENTIAL    Narrative:     The following orders were created for panel order CBC auto differential.  Procedure                               Abnormality         Status                     ---------                               -----------         ------                     CBC with Differential[[id number]]        Abnormal            Final result                 Please view results for these tests on the individual orders.   TROPONIN ISTAT   POCT TROPONIN     EKG Readings: (Independently Interpreted)   Initial Reading: No STEMI. Rhythm: Normal Sinus Rhythm. Heart Rate: 93. Ectopy: No Ectopy. Conduction: Normal. ST Segments: Normal ST Segments. T Waves: Normal. Axis: Normal. Clinical Impression: Normal Sinus Rhythm     Imaging Results              X-Ray Chest AP Portable (Preliminary result)  Result time 05/22/23 20:43:53      Wet Read by Mo Tinajero MD (05/22/23 20:43:53, [facility] - Emergency Dept, Emergency Medicine)    Acute cardiopulmonary process                                     Medications   ketorolac injection 15 mg (15 mg Intravenous Given 5/22/23 2027)     Medical Decision Making:   Initial Assessment:   27 year old female  in no acute distress complaining of midsternal chest pressure.   Differential Diagnosis:   MI, costochondritis, pleurisy, pneumonia  Independently Interpreted Test(s):   I have ordered and independently interpreted X-rays - see summary below.       <> Summary of X-Ray Reading(s): No infiltrate, effusion or pneumothorax.   Clinical Tests:   Lab Tests: Ordered and Reviewed  The following lab test(s) were unremarkable: CBC, CMP, UPT and Troponin       <> Summary of Lab: No leukocytosis, normal H&H.  sodium, potassium, chloride, creatinine within normal limits.  UPT negative  ED Management:  Cardiac workup negative. Troponin negative. EKG NSR. No leukocytosis, no infiltrate or pneumo on CXR. Ketorolac given in Er. Patient to be discharged home with diclofenac.            ED Course as of 05/22/23 2046   Mon May 22, 2023   1953 WBC: 7.34 [LN]   1953 Hemoglobin: 13.6 [LN]   1953 Hematocrit: 42.7 [LN]   1959 POC Cardiac Troponin I: 0.00 [LN]   2014 Creatinine: 0.71 [LN]   2014 Potassium: 3.9 [LN]      ED Course User Index  [LN] GILDARDO Jordan                 Clinical Impression:   Final diagnoses:  [R07.9] Chest pain  [R07.89] Chest wall pain (Primary)        ED Disposition Condition    Discharge Stable          ED Prescriptions       Medication Sig Dispense Start Date End Date Auth. Provider    diclofenac (VOLTAREN) 50 MG EC tablet Take 1 tablet (50 mg total) by mouth 3 (three) times daily as needed (pain). 21 tablet 5/22/2023 5/29/2023 GILDARDO Jordan          Follow-up Information       Follow up With Specialties Details Why Contact Info    [facility]   [facility] [facility]  [address] [facility] [id number]  [phone]               GILDARDO Jordan  05/22/23 2046

## 2023-05-31 ENCOUNTER — LAB VISIT (OUTPATIENT)
Dept: LAB | Facility: HOSPITAL | Age: 28
End: 2023-05-31
Attending: PHYSICIAN ASSISTANT
Payer: MEDICAID

## 2023-05-31 DIAGNOSIS — R74.01 NONSPECIFIC ELEVATION OF LEVELS OF TRANSAMINASE OR LACTIC ACID DEHYDROGENASE (LDH): Primary | ICD-10-CM

## 2023-05-31 DIAGNOSIS — R74.02 NONSPECIFIC ELEVATION OF LEVELS OF TRANSAMINASE OR LACTIC ACID DEHYDROGENASE (LDH): Primary | ICD-10-CM

## 2023-05-31 LAB
ALBUMIN SERPL-MCNC: 3.3 G/DL (ref 3.5–5)
ALP SERPL-CCNC: 87 UNIT/L (ref 40–150)
ALT SERPL-CCNC: 45 UNIT/L (ref 0–55)
AST SERPL-CCNC: 21 UNIT/L (ref 5–34)
BILIRUBIN DIRECT+TOT PNL SERPL-MCNC: 0.2 MG/DL (ref 0–0.8)
BILIRUBIN DIRECT+TOT PNL SERPL-MCNC: 0.2 MG/DL (ref 0–?)
BILIRUBIN DIRECT+TOT PNL SERPL-MCNC: 0.4 MG/DL
FERRITIN SERPL-MCNC: 127.33 NG/ML (ref 4.63–204)
HAV IGM SERPL QL IA: NONREACTIVE
HBV CORE IGM SERPL QL IA: NONREACTIVE
HBV SURFACE AG SERPL QL IA: NONREACTIVE
HCV AB SERPL QL IA: NONREACTIVE
IRON SATN MFR SERPL: 34 % (ref 20–50)
IRON SERPL-MCNC: 100 UG/DL (ref 50–170)
PROT SERPL-MCNC: 6.8 GM/DL (ref 6.4–8.3)
TIBC SERPL-MCNC: 195 UG/DL (ref 70–310)
TIBC SERPL-MCNC: 295 UG/DL (ref 250–450)
TRANSFERRIN SERPL-MCNC: 249 MG/DL (ref 180–382)
TRANSFERRIN SERPL-MCNC: 249 MG/DL (ref 180–382)

## 2023-05-31 PROCEDURE — 80076 HEPATIC FUNCTION PANEL: CPT

## 2023-05-31 PROCEDURE — 80074 ACUTE HEPATITIS PANEL: CPT

## 2023-05-31 PROCEDURE — 82728 ASSAY OF FERRITIN: CPT

## 2023-05-31 PROCEDURE — 82390 ASSAY OF CERULOPLASMIN: CPT

## 2023-05-31 PROCEDURE — 86381 MITOCHONDRIAL ANTIBODY EACH: CPT

## 2023-05-31 PROCEDURE — 86015 ACTIN ANTIBODY EACH: CPT

## 2023-05-31 PROCEDURE — 84466 ASSAY OF TRANSFERRIN: CPT

## 2023-05-31 PROCEDURE — 83550 IRON BINDING TEST: CPT

## 2023-05-31 PROCEDURE — 36415 COLL VENOUS BLD VENIPUNCTURE: CPT

## 2023-05-31 PROCEDURE — 86039 ANTINUCLEAR ANTIBODIES (ANA): CPT

## 2023-06-01 DIAGNOSIS — R74.01 ELEVATED LIVER TRANSAMINASE LEVEL: Primary | ICD-10-CM

## 2023-06-01 LAB
ANA PAT SER IF-IMP: ABNORMAL
ANA SER QL HEP2 SUBST: ABNORMAL
ANA TITR SER HEP2 SUBST: ABNORMAL {TITER}
CERULOPLASMIN SERPL-MCNC: 33.5 MG/DL (ref 20–51)
MITOCHONDRIA M2 AB SER IA-ACNC: <0.1 U
PATH REV: NORMAL
SMOOTH MUSCLE AB SER QL IF: NEGATIVE

## 2023-06-07 DIAGNOSIS — I10 HYPERTENSION, UNSPECIFIED TYPE: ICD-10-CM

## 2023-06-08 RX ORDER — METOPROLOL TARTRATE 25 MG/1
25 TABLET, FILM COATED ORAL 2 TIMES DAILY
Qty: 60 TABLET | Refills: 11 | Status: SHIPPED | OUTPATIENT
Start: 2023-06-08 | End: 2023-08-09 | Stop reason: SDUPTHER

## 2023-06-08 RX ORDER — ASPIRIN 81 MG/1
81 TABLET ORAL DAILY
Qty: 30 TABLET | Refills: 11 | Status: SHIPPED | OUTPATIENT
Start: 2023-06-08 | End: 2023-08-09 | Stop reason: SDUPTHER

## 2023-06-13 ENCOUNTER — TELEPHONE (OUTPATIENT)
Dept: GYNECOLOGY | Facility: CLINIC | Age: 28
End: 2023-06-13

## 2023-06-13 ENCOUNTER — OFFICE VISIT (OUTPATIENT)
Dept: GYNECOLOGY | Facility: CLINIC | Age: 28
End: 2023-06-13
Payer: MEDICAID

## 2023-06-13 ENCOUNTER — LAB VISIT (OUTPATIENT)
Dept: LAB | Facility: HOSPITAL | Age: 28
End: 2023-06-13
Attending: NURSE PRACTITIONER
Payer: MEDICAID

## 2023-06-13 VITALS
BODY MASS INDEX: 50.02 KG/M2 | HEART RATE: 82 BPM | RESPIRATION RATE: 20 BRPM | DIASTOLIC BLOOD PRESSURE: 81 MMHG | TEMPERATURE: 98 F | SYSTOLIC BLOOD PRESSURE: 122 MMHG | OXYGEN SATURATION: 100 % | HEIGHT: 64 IN | WEIGHT: 293 LBS

## 2023-06-13 DIAGNOSIS — Z23 NEED FOR HPV VACCINE: ICD-10-CM

## 2023-06-13 DIAGNOSIS — Z12.4 PAP SMEAR FOR CERVICAL CANCER SCREENING: Primary | ICD-10-CM

## 2023-06-13 DIAGNOSIS — E66.9 OBESITY, UNSPECIFIED CLASSIFICATION, UNSPECIFIED OBESITY TYPE, UNSPECIFIED WHETHER SERIOUS COMORBIDITY PRESENT: ICD-10-CM

## 2023-06-13 DIAGNOSIS — N93.9 ABNORMAL UTERINE BLEEDING (AUB): ICD-10-CM

## 2023-06-13 DIAGNOSIS — Z11.3 ROUTINE SCREENING FOR STI (SEXUALLY TRANSMITTED INFECTION): ICD-10-CM

## 2023-06-13 LAB
B-HCG UR QL: NEGATIVE
C TRACH DNA SPEC QL NAA+PROBE: NOT DETECTED
CLUE CELLS VAG QL WET PREP: ABNORMAL
CTP QC/QA: YES
FSH SERPL-ACNC: 6.3 MIU/ML
HIV 1+2 AB+HIV1 P24 AG SERPL QL IA: NONREACTIVE
N GONORRHOEA DNA SPEC QL NAA+PROBE: NOT DETECTED
PROLACTIN LEVEL (OHS): 9.14 NG/ML (ref 5.18–26.53)
T PALLIDUM AB SER QL: REACTIVE
T VAGINALIS VAG QL WET PREP: ABNORMAL
TESTOST SERPL-MCNC: 39.95 NG/DL (ref 13.84–53.35)
WBC #/AREA VAG WET PREP: ABNORMAL
YEAST SPEC QL WET PREP: ABNORMAL

## 2023-06-13 PROCEDURE — 83001 ASSAY OF GONADOTROPIN (FSH): CPT

## 2023-06-13 PROCEDURE — 90471 IMMUNIZATION ADMIN: CPT | Mod: PBBFAC

## 2023-06-13 PROCEDURE — 90651 9VHPV VACCINE 2/3 DOSE IM: CPT | Mod: PBBFAC

## 2023-06-13 PROCEDURE — 81025 URINE PREGNANCY TEST: CPT | Mod: PBBFAC | Performed by: NURSE PRACTITIONER

## 2023-06-13 PROCEDURE — 87625 HPV TYPES 16 & 18 ONLY: CPT | Mod: 59

## 2023-06-13 PROCEDURE — 36415 COLL VENOUS BLD VENIPUNCTURE: CPT

## 2023-06-13 PROCEDURE — 3066F PR DOCUMENTATION OF TREATMENT FOR NEPHROPATHY: ICD-10-PCS | Mod: CPTII,,, | Performed by: NURSE PRACTITIONER

## 2023-06-13 PROCEDURE — 3074F SYST BP LT 130 MM HG: CPT | Mod: CPTII,,, | Performed by: NURSE PRACTITIONER

## 2023-06-13 PROCEDURE — 99214 OFFICE O/P EST MOD 30 MIN: CPT | Mod: PBBFAC | Performed by: NURSE PRACTITIONER

## 2023-06-13 PROCEDURE — 87491 CHLMYD TRACH DNA AMP PROBE: CPT | Performed by: NURSE PRACTITIONER

## 2023-06-13 PROCEDURE — 88174 CYTOPATH C/V AUTO IN FLUID: CPT | Performed by: NURSE PRACTITIONER

## 2023-06-13 PROCEDURE — 3060F PR POS MICROALBUMINURIA RESULT DOCUMENTED/REVIEW: ICD-10-PCS | Mod: CPTII,,, | Performed by: NURSE PRACTITIONER

## 2023-06-13 PROCEDURE — 3008F PR BODY MASS INDEX (BMI) DOCUMENTED: ICD-10-PCS | Mod: CPTII,,, | Performed by: NURSE PRACTITIONER

## 2023-06-13 PROCEDURE — 3066F NEPHROPATHY DOC TX: CPT | Mod: CPTII,,, | Performed by: NURSE PRACTITIONER

## 2023-06-13 PROCEDURE — 87591 N.GONORRHOEAE DNA AMP PROB: CPT | Performed by: NURSE PRACTITIONER

## 2023-06-13 PROCEDURE — 84403 ASSAY OF TOTAL TESTOSTERONE: CPT

## 2023-06-13 PROCEDURE — 1159F MED LIST DOCD IN RCRD: CPT | Mod: CPTII,,, | Performed by: NURSE PRACTITIONER

## 2023-06-13 PROCEDURE — 3079F PR MOST RECENT DIASTOLIC BLOOD PRESSURE 80-89 MM HG: ICD-10-PCS | Mod: CPTII,,, | Performed by: NURSE PRACTITIONER

## 2023-06-13 PROCEDURE — 87389 HIV-1 AG W/HIV-1&-2 AB AG IA: CPT

## 2023-06-13 PROCEDURE — 99395 PR PREVENTIVE VISIT,EST,18-39: ICD-10-PCS | Mod: S$PBB,,, | Performed by: NURSE PRACTITIONER

## 2023-06-13 PROCEDURE — 3079F DIAST BP 80-89 MM HG: CPT | Mod: CPTII,,, | Performed by: NURSE PRACTITIONER

## 2023-06-13 PROCEDURE — 86780 TREPONEMA PALLIDUM: CPT

## 2023-06-13 PROCEDURE — 3008F BODY MASS INDEX DOCD: CPT | Mod: CPTII,,, | Performed by: NURSE PRACTITIONER

## 2023-06-13 PROCEDURE — 84146 ASSAY OF PROLACTIN: CPT

## 2023-06-13 PROCEDURE — 1159F PR MEDICATION LIST DOCUMENTED IN MEDICAL RECORD: ICD-10-PCS | Mod: CPTII,,, | Performed by: NURSE PRACTITIONER

## 2023-06-13 PROCEDURE — 3074F PR MOST RECENT SYSTOLIC BLOOD PRESSURE < 130 MM HG: ICD-10-PCS | Mod: CPTII,,, | Performed by: NURSE PRACTITIONER

## 2023-06-13 PROCEDURE — 87624 HPV HI-RISK TYP POOLED RSLT: CPT

## 2023-06-13 PROCEDURE — 99395 PREV VISIT EST AGE 18-39: CPT | Mod: S$PBB,,, | Performed by: NURSE PRACTITIONER

## 2023-06-13 PROCEDURE — 86592 SYPHILIS TEST NON-TREP QUAL: CPT

## 2023-06-13 PROCEDURE — 87210 SMEAR WET MOUNT SALINE/INK: CPT | Performed by: NURSE PRACTITIONER

## 2023-06-13 PROCEDURE — 3060F POS MICROALBUMINURIA REV: CPT | Mod: CPTII,,, | Performed by: NURSE PRACTITIONER

## 2023-06-13 RX ORDER — NAPROXEN 500 MG/1
TABLET ORAL
COMMUNITY
Start: 2022-08-26 | End: 2023-12-12

## 2023-06-13 RX ADMIN — HUMAN PAPILLOMAVIRUS 9-VALENT VACCINE, RECOMBINANT 0.5 ML: 30; 40; 60; 40; 20; 20; 20; 20; 20 INJECTION, SUSPENSION INTRAMUSCULAR at 02:06

## 2023-06-13 NOTE — PROGRESS NOTES
"  Subjective:       Patient ID: Esthela Maddox is a 27 y.o. female.    Chief Complaint:  Gynecologic Exam      History of Present Illness  The patient G0 here for annual exam. Her LMP was 3 months ago. States periods have been irregular in 2018, prior to this time, cycles are occurring every 3-4 months prior. Pt was last seen in , she was to proceed with pelvic uls and wanted to think about IUD, however pelvic uls not completed and no f/u until today. PCOS labs in 2019 were normal. Admits hirsutism. Admits history of abnormal paps, pap in 2016-ASCUS and HPV neg. Pap was LSIL and HPV neg in 2019 and LSIL and HPV positive OHR in . Pt was n/s for colpo clinic in 2020, no f/u since this time. Denies breast or urinary complaints. Pt reports hx of ovarian cyst diagnosed by pelvic uls in 2018. Denies pelvic pain, abnormal bleeding or discharge. Pt reports trichomonas with treatment in the past and in . She was also treated for chlamydia in . Pt also treated for syphilis with treatment of 3 Bicillin shots. Dils per labs 2016-128 to 2016-16. Dils in 2019 of 8 and -dil of 4. Pt currently not on contraception, in same sex relationship. Denies tobacco use. Dep. screening 0. Denies fly hx of breast, ovarian, uterine or colon cancer.     GYN & OB History  No LMP recorded (lmp unknown).       Review of patient's allergies indicates:  No Known Allergies  Past Medical History:   Diagnosis Date    Hypertension     Morbid obesity     SVT (supraventricular tachycardia)      OB History    Para Term  AB Living   0 0 0 0 0 0   SAB IAB Ectopic Multiple Live Births   0 0 0 0 0        Review of Systems  Review of Systems    Negative except for pertinent findings for positives per HPI     Objective:    Physical Exam    /81 (BP Location: Left arm, Patient Position: Sitting, BP Method: Large (Automatic))   Pulse 82   Temp 97.6 °F (36.4 °C) (Oral)   Resp 20   Ht 5' 4" (1.626 m)   Wt " (!) 191.5 kg (422 lb 3.2 oz)   LMP  (LMP Unknown)   SpO2 100%   BMI 72.47 kg/m²   GENERAL: Well-developed female in no acute distress.  SKIN: Normal to inspection, warm and intact. Hirsutism noted.  BREASTS: No masses, lumps, discharge, tenderness.  VULVA: General appearance normal; external genitalia with no lesions or erythema.  VAGINA: Mucosa normal, pink, no abnormal discharge or lesions.  CERVIX: Grossly normal, pink, no erythema or abnormal discharge.  BIMANUAL EXAM: limited exam d/t body habitus. Prateek adnexa reveal no tenderness.  PSYCHIATRIC: Patient is oriented to person, place, and time. Mood and affect are normal.    Assessment:       1. Pap smear for cervical cancer screening    2. Need for HPV vaccine    3. Routine screening for STI (sexually transmitted infection)    4. Abnormal uterine bleeding (AUB)    5. Obesity, unspecified classification, unspecified obesity type, unspecified whether serious comorbidity present         Plan:   Esthela was seen today for gynecologic exam.    Diagnoses and all orders for this visit:    Pap smear for cervical cancer screening  -     Liquid-Based Pap Smear, Screening Screening    Need for HPV vaccine  -     hpv vaccine,9-brayan (GARDASIL 9) vaccine 0.5 mL    Routine screening for STI (sexually transmitted infection)  -     Chlamydia/GC, PCR  -     SYPHILIS ANTIBODY (WITH REFLEX RPR); Future  -     Wet Prep, Genital    Abnormal uterine bleeding (AUB)  -     US Pelvis Comp with Transvag NON-OB (xpd; Future  -     POCT urine pregnancy  -     Prolactin; Future  -     Testosterone; Future  -     Follicle Stimulating Hormone; Future  -     HIV 1/2 Ag/Ab (4th Gen); Future    Obesity, unspecified classification, unspecified obesity type, unspecified whether serious comorbidity present    Pap today  HPV #2  STI screen  AUB possible PCOS, PCOS labs today, pelvic uls and GYN for evaluation/management  Healthy lifestyle choices. Importance of maintaining a healthy BMI. Healthy  diet including limiting fast foods, processed foods, foods containing high amounts of saturated fats or high sodium content. Recommend low carb, low fat diet rich in lean meat and fish, non starchy vegetables, fruits and good fat while maintaining portion control. Limit sugar intake. Recommended plenty of water, avoiding carbonated beverages and high fructose corn syrup. Regular cardiovascular exercise, at least 150 minutes of cardiovascular exercise (at least 30 mins 5x/week).  Follow up in about 1 year (around 6/13/2024) for annual exam.

## 2023-06-13 NOTE — PROGRESS NOTES
2nd dose of Gardsil administered per [facility] HPV Standing Order/Protocol. Patient tolerated well and left in stable condition.

## 2023-06-13 NOTE — TELEPHONE ENCOUNTER
Per Liliane, patients appt with GYN needs to be moved up based off of her history. Patients appt is now on 8/2/23 at 9:45 AM. Please call patient and make her aware of date and time. Thank you.

## 2023-06-14 ENCOUNTER — TELEPHONE (OUTPATIENT)
Dept: GYNECOLOGY | Facility: CLINIC | Age: 28
End: 2023-06-14

## 2023-06-14 DIAGNOSIS — N76.0 BV (BACTERIAL VAGINOSIS): Primary | ICD-10-CM

## 2023-06-14 DIAGNOSIS — B96.89 BV (BACTERIAL VAGINOSIS): Primary | ICD-10-CM

## 2023-06-14 LAB
RPR SER QL: REACTIVE
RPR SER-TITR: ABNORMAL {TITER}

## 2023-06-14 RX ORDER — METRONIDAZOLE 500 MG/1
500 TABLET ORAL 2 TIMES DAILY
Qty: 14 TABLET | Refills: 0 | Status: SHIPPED | OUTPATIENT
Start: 2023-06-14 | End: 2023-06-21

## 2023-06-14 NOTE — TELEPHONE ENCOUNTER
Please advise on results. Thank you.       ----- Message from Estrellita Turcios RN sent at 6/14/2023  1:11 PM CDT -----  Regarding: FW: results    ----- Message -----  From: Malissa Jiang  Sent: 6/14/2023   1:09 PM CDT  To: [facility] Clinical Support Staff  Subject: results                                          Pt requesting nurse call her to review results pt viewed on portal

## 2023-06-20 LAB — PSYCHE PATHOLOGY RESULT: NORMAL

## 2023-07-01 ENCOUNTER — HOSPITAL ENCOUNTER (EMERGENCY)
Facility: HOSPITAL | Age: 28
Discharge: HOME | End: 2023-07-01
Attending: EMERGENCY MEDICINE
Payer: MEDICAID

## 2023-07-01 VITALS
HEIGHT: 64 IN | BODY MASS INDEX: 50.02 KG/M2 | TEMPERATURE: 98 F | HEART RATE: 83 BPM | SYSTOLIC BLOOD PRESSURE: 140 MMHG | RESPIRATION RATE: 18 BRPM | OXYGEN SATURATION: 98 % | DIASTOLIC BLOOD PRESSURE: 100 MMHG | WEIGHT: 293 LBS

## 2023-07-01 DIAGNOSIS — H66.92 LEFT OTITIS MEDIA, UNSPECIFIED OTITIS MEDIA TYPE: Primary | ICD-10-CM

## 2023-07-01 DIAGNOSIS — R51.9 ACUTE NONINTRACTABLE HEADACHE, UNSPECIFIED HEADACHE TYPE: ICD-10-CM

## 2023-07-01 LAB — B-HCG SERPL QL: NEGATIVE

## 2023-07-01 PROCEDURE — 81025 URINE PREGNANCY TEST: CPT

## 2023-07-01 PROCEDURE — 25000003 PHARM REV CODE 250

## 2023-07-01 PROCEDURE — 99284 EMERGENCY DEPT VISIT MOD MDM: CPT

## 2023-07-01 RX ORDER — IBUPROFEN 800 MG/1
800 TABLET ORAL EVERY 6 HOURS PRN
Qty: 20 TABLET | Refills: 0 | Status: SHIPPED | OUTPATIENT
Start: 2023-07-01 | End: 2023-12-12

## 2023-07-01 RX ORDER — IBUPROFEN 400 MG/1
800 TABLET ORAL
Status: COMPLETED | OUTPATIENT
Start: 2023-07-01 | End: 2023-07-01

## 2023-07-01 RX ORDER — AMOXICILLIN AND CLAVULANATE POTASSIUM 875; 125 MG/1; MG/1
1 TABLET, FILM COATED ORAL EVERY 12 HOURS
Qty: 14 TABLET | Refills: 0 | Status: SHIPPED | OUTPATIENT
Start: 2023-07-01 | End: 2023-11-09

## 2023-07-01 RX ADMIN — IBUPROFEN 800 MG: 400 TABLET, FILM COATED ORAL at 03:07

## 2023-07-01 NOTE — Clinical Note
"Esthela Bellajune Maddox was seen and treated in our emergency department on 7/1/2023.  She may return to work on 07/02/2023.       If you have any questions or concerns, please don't hesitate to call.      Elodia Bledsoe NP"

## 2023-07-01 NOTE — ED PROVIDER NOTES
Encounter Date: 7/1/2023       History     Chief Complaint   Patient presents with    Otalgia     Pt complaint of left ear pain and headache sincel astnight     27 y.o   female with a history of hypertension and SVT presents to Emergency Department with a chief complaint of L ear pain. Symptoms began on yesterday and have been constant since onset. Associated symptoms include headache. Symptoms are aggravated with palpation and there are no alleviating factors. The patient denies CP, SOB, cough, congestion, fever, or abdominal pain. No other reported symptoms at this time      The history is provided by the patient. No  was used.   Otalgia  This is a new problem. The current episode started yesterday. There is pain in the left ear. The problem occurs throughout the day. The problem has been unchanged. Associated symptoms include headaches. Pertinent negatives include no ear discharge, no sore throat, no abdominal pain, no diarrhea, no vomiting and no cough.   Review of patient's allergies indicates:  No Known Allergies  Past Medical History:   Diagnosis Date    Hypertension     Morbid obesity     SVT (supraventricular tachycardia)      History reviewed. No pertinent surgical history.  Family History   Problem Relation Age of Onset    Diabetes Mother     Hypertension Mother     Diabetes Sister     Stroke Sister     Hypertension Sister      Social History     Tobacco Use    Smoking status: Never    Smokeless tobacco: Never   Substance Use Topics    Alcohol use: Not Currently     Comment: ocass    Drug use: Yes     Frequency: 2.0 times per week     Types: Marijuana     Review of Systems   Constitutional:  Negative for chills, fatigue and fever.   HENT:  Positive for ear pain. Negative for congestion, ear discharge, sore throat, trouble swallowing and voice change.    Eyes:  Negative for photophobia and visual disturbance.   Respiratory:  Negative for cough, shortness of breath,  wheezing and stridor.    Cardiovascular:  Negative for chest pain, palpitations and leg swelling.   Gastrointestinal:  Negative for abdominal pain, diarrhea and vomiting.   Neurological:  Positive for headaches. Negative for dizziness, syncope and weakness.   All other systems reviewed and are negative.    Physical Exam     Initial Vitals [07/01/23 1357]   BP Pulse Resp Temp SpO2   (!) 140/100 83 18 98.2 °F (36.8 °C) 98 %      MAP       --         Physical Exam    Nursing note and vitals reviewed.  Constitutional: She appears well-developed and well-nourished. She is not diaphoretic. She is Obese . No distress.   HENT:   Head: Normocephalic and atraumatic.   Right Ear: Hearing, tympanic membrane, external ear and ear canal normal.   Left Ear: Hearing, external ear and ear canal normal. There is tenderness. Tympanic membrane is erythematous.   Nose: Nose normal.   Mouth/Throat: Oropharynx is clear and moist. No oropharyngeal exudate.   Eyes: Conjunctivae and EOM are normal. Pupils are equal, round, and reactive to light. Right eye exhibits no discharge. Left eye exhibits no discharge.   Neck: Neck supple.   Normal range of motion.  Cardiovascular:  Normal rate, regular rhythm, S1 normal, S2 normal, normal heart sounds, intact distal pulses and normal pulses.           Pulmonary/Chest: Effort normal and breath sounds normal. No respiratory distress. She has no wheezes. She exhibits no tenderness.   Abdominal: Abdomen is soft. Bowel sounds are normal.   Musculoskeletal:         General: Normal range of motion.      Cervical back: Normal range of motion and neck supple.     Neurological: She is alert and oriented to person, place, and time. She has normal strength. No sensory deficit. GCS score is 15. GCS eye subscore is 4. GCS verbal subscore is 5. GCS motor subscore is 6.   Skin: Skin is warm and dry. Capillary refill takes less than 2 seconds. No rash noted. No erythema.   Psychiatric: She has a normal mood and  affect. Thought content normal.       ED Course   Procedures  Labs Reviewed   PREGNANCY TEST, URINE RAPID - Normal          Imaging Results    None          Medications   ibuprofen tablet 800 mg (800 mg Oral Given 7/1/23 7852)     Medical Decision Making:   Initial Assessment:   Patient awake, alert, has non-labored breathing, and follows commands appropriately. C/o L ear pain and headache that began on yesterday. Denies any additional complaints. Afebrile. NAD noted.   Differential Diagnosis:   Otitis Media, Headache, Migraine   Clinical Tests:   Lab Tests: Ordered and Reviewed  ED Management:  Co-morbidities and/or factors adding to the complexity or risk for the patient?: none  Differential diagnoses: Otitis Media, Headache, Migraine   Decision to obtain previous or outside records?: I did not review records.   Chart Review (nursing home, outside records, CareEverywhere): none  Review of RX medications/new RX prescribed by me?: Prescribed Ibuprofen and Augmentin.   Labs/imaging/other tests obtained/considered (risk/benefits of testing discussed): upt  Labs/tests intepretation: upt-negative. Informed patient of results.   My independent imaging interpretation: none  Treatment/interventions, IV fluids, IV medications: Ibuprofen given in ER with improvement of symptoms.   Complex management (IV controlled substances, went to OR, DNR, meds requiring monitoring, transfer, etc)?: none  Workup/treatment affected by social determinants of health?:none   Point of care US done/interpretation: none  Consults/radiologist/EMS/social work/family discussion/alternate history: none  Advanced care planning/end of life discussion: none  Shared decision making: Discussed plan of care and interventions with patient. Agreed to and aware of plan of care. Comfortable being discharged home.   ETOH/smoking/drug cessation discussion: none  Dispo: Patient discharged home. Patient denies new or additional complaints; no further tests  indicated at this time. Verbalized understanding of instructions. No emergent or apparent distress noted prior to discharge. To follow up with PCP in 1 week as needed. Strict ER return precautions given.                ED Course as of 07/01/23 1552   Sat Jul 01, 2023   1534 Patient reports symptoms have improved since med admin.  [JA]      ED Course User Index  [JA] Elodia Bledsoe NP                 Clinical Impression:   Final diagnoses:  [H66.92] Left otitis media, unspecified otitis media type (Primary)  [R51.9] Acute nonintractable headache, unspecified headache type        ED Disposition Condition    Discharge Stable          ED Prescriptions       Medication Sig Dispense Start Date End Date Auth. Provider    ibuprofen (ADVIL,MOTRIN) 800 MG tablet Take 1 tablet (800 mg total) by mouth every 6 (six) hours as needed for Pain. 20 tablet 7/1/2023 -- Elodia Bledsoe NP    amoxicillin-clavulanate 875-125mg (AUGMENTIN) 875-125 mg per tablet Take 1 tablet by mouth every 12 (twelve) hours. 14 tablet 7/1/2023 -- Elodia Bledsoe NP          Follow-up Information       Follow up With Specialties Details Why Contact Info    [facility] Call in 1 week If symptoms worsen, As needed [address]  [address]  [facility] [id number] [phone]      [facility] - Emergency Dept Emergency Medicine Go to  If symptoms worsen, As needed [id number]  [facility] [id number] [phone]             Elodia Bledsoe NP  07/01/23 1552

## 2023-07-26 ENCOUNTER — TELEPHONE (OUTPATIENT)
Dept: GYNECOLOGY | Facility: CLINIC | Age: 28
End: 2023-07-26

## 2023-07-26 NOTE — TELEPHONE ENCOUNTER
Attempted to reach pt regarding pap and lab results.     Pap HPV positive OHR, will repeat next annual.    Hx of syphilis with treatment. RPR 2023 was 4 dils. RPR in 2020 (cerner) was 4 dils and has decreased since diagnosis and treatment. Dils stable at 4, does not require additional treatment at this time.

## 2023-08-09 DIAGNOSIS — I10 HYPERTENSION, UNSPECIFIED TYPE: ICD-10-CM

## 2023-08-09 RX ORDER — METOPROLOL TARTRATE 25 MG/1
25 TABLET, FILM COATED ORAL 2 TIMES DAILY
Qty: 60 TABLET | Refills: 11 | Status: SHIPPED | OUTPATIENT
Start: 2023-08-09 | End: 2023-11-21 | Stop reason: SDUPTHER

## 2023-08-09 RX ORDER — ASPIRIN 81 MG/1
81 TABLET ORAL DAILY
Qty: 30 TABLET | Refills: 11 | Status: SHIPPED | OUTPATIENT
Start: 2023-08-09 | End: 2023-11-21 | Stop reason: SDUPTHER

## 2023-09-09 ENCOUNTER — HOSPITAL ENCOUNTER (EMERGENCY)
Facility: HOSPITAL | Age: 28
Discharge: HOME | End: 2023-09-09
Attending: STUDENT IN AN ORGANIZED HEALTH CARE EDUCATION/TRAINING PROGRAM
Payer: MEDICAID

## 2023-09-09 VITALS
HEIGHT: 64 IN | WEIGHT: 293 LBS | RESPIRATION RATE: 18 BRPM | SYSTOLIC BLOOD PRESSURE: 144 MMHG | HEART RATE: 88 BPM | TEMPERATURE: 98 F | DIASTOLIC BLOOD PRESSURE: 94 MMHG | BODY MASS INDEX: 50.02 KG/M2 | OXYGEN SATURATION: 98 %

## 2023-09-09 DIAGNOSIS — U07.1 COVID-19: Primary | ICD-10-CM

## 2023-09-09 LAB
FLUAV AG UPPER RESP QL IA.RAPID: NOT DETECTED
FLUBV AG UPPER RESP QL IA.RAPID: NOT DETECTED
SARS-COV-2 RNA RESP QL NAA+PROBE: DETECTED
STREP A PCR (OHS): NOT DETECTED

## 2023-09-09 PROCEDURE — 87651 STREP A DNA AMP PROBE: CPT

## 2023-09-09 PROCEDURE — 0240U COVID/FLU A&B PCR: CPT

## 2023-09-09 PROCEDURE — 99284 EMERGENCY DEPT VISIT MOD MDM: CPT

## 2023-09-09 RX ORDER — LORATADINE 10 MG/1
10 TABLET ORAL DAILY
Qty: 30 TABLET | Refills: 0 | Status: SHIPPED | OUTPATIENT
Start: 2023-09-09 | End: 2023-12-12

## 2023-09-09 RX ORDER — BENZONATATE 100 MG/1
100 CAPSULE ORAL 3 TIMES DAILY PRN
Qty: 20 CAPSULE | Refills: 0 | Status: SHIPPED | OUTPATIENT
Start: 2023-09-09 | End: 2023-11-09

## 2023-09-09 RX ORDER — FLUTICASONE PROPIONATE 50 MCG
1 SPRAY, SUSPENSION (ML) NASAL 2 TIMES DAILY PRN
Qty: 15 G | Refills: 0 | Status: SHIPPED | OUTPATIENT
Start: 2023-09-09 | End: 2023-11-09

## 2023-09-09 RX ORDER — ALBUTEROL SULFATE 90 UG/1
1-2 AEROSOL, METERED RESPIRATORY (INHALATION) EVERY 6 HOURS PRN
Qty: 18 G | Refills: 0 | Status: SHIPPED | OUTPATIENT
Start: 2023-09-09 | End: 2023-11-09

## 2023-09-09 NOTE — ED PROVIDER NOTES
Encounter Date: 9/9/2023       History     Chief Complaint   Patient presents with    Nasal Congestion     C/o nasal congestion for 2 days. Temp. Not checked.     Sore Throat     27 y.o  female with a history of hypertension and SVT presents to Emergency Department with a chief complaint of cough. Symptoms began on yesterday and have been constant since onset. Associated symptoms include nasal congestion and sore throat. Symptoms are aggravated with coughing and there are no alleviating factors. The patient denies CP, SOB, fever, abdominal pain, or dizziness. No other reported symptoms at this time      The history is provided by the patient. No  was used.   Cough  This is a new problem. The current episode started yesterday. The problem occurs every few minutes. The problem has been unchanged. The cough is Non-productive. There has been no fever. Associated symptoms include rhinorrhea and sore throat. Pertinent negatives include no chest pain, no chills, no shortness of breath and no wheezing.     Review of patient's allergies indicates:  No Known Allergies  Past Medical History:   Diagnosis Date    Hypertension     Morbid obesity     SVT (supraventricular tachycardia)      History reviewed. No pertinent surgical history.  Family History   Problem Relation Age of Onset    Diabetes Mother     Hypertension Mother     Diabetes Sister     Stroke Sister     Hypertension Sister      Social History     Tobacco Use    Smoking status: Never    Smokeless tobacco: Never   Substance Use Topics    Alcohol use: Not Currently     Comment: ocass    Drug use: Yes     Frequency: 2.0 times per week     Types: Marijuana     Review of Systems   Constitutional:  Negative for chills, fatigue and fever.   HENT:  Positive for congestion, rhinorrhea and sore throat. Negative for trouble swallowing and voice change.    Eyes:  Negative for photophobia and visual disturbance.   Respiratory:  Positive for  cough. Negative for shortness of breath, wheezing and stridor.    Cardiovascular:  Negative for chest pain, palpitations and leg swelling.   Gastrointestinal:  Negative for abdominal pain, nausea and vomiting.   All other systems reviewed and are negative.      Physical Exam     Initial Vitals [09/09/23 1723]   BP Pulse Resp Temp SpO2   (!) 144/94 92 19 98.1 °F (36.7 °C) 98 %      MAP       --         Physical Exam    Nursing note and vitals reviewed.  Constitutional: She appears well-developed and well-nourished. She is not diaphoretic. She is Obese . She is cooperative.  Non-toxic appearance. No distress.   HENT:   Head: Normocephalic and atraumatic.   Right Ear: Hearing, tympanic membrane, external ear and ear canal normal.   Left Ear: Hearing, tympanic membrane, external ear and ear canal normal.   Nose: Rhinorrhea present.   Mouth/Throat: Oropharynx is clear and moist and mucous membranes are normal.   Eyes: Conjunctivae and EOM are normal. Pupils are equal, round, and reactive to light.   Neck: Neck supple.   Normal range of motion.  Cardiovascular:  Normal rate, regular rhythm, S1 normal, S2 normal, normal heart sounds, intact distal pulses and normal pulses.           Pulmonary/Chest: Effort normal and breath sounds normal. No respiratory distress. She has no wheezes. She has no rales.   Abdominal: Abdomen is soft. Bowel sounds are normal. She exhibits no distension. There is no abdominal tenderness. There is no rebound.   Musculoskeletal:         General: Normal range of motion.      Cervical back: Normal range of motion and neck supple.     Neurological: She is alert and oriented to person, place, and time. She has normal strength. No sensory deficit. GCS score is 15. GCS eye subscore is 4. GCS verbal subscore is 5. GCS motor subscore is 6.   Skin: Skin is warm and dry. Capillary refill takes less than 2 seconds.   Psychiatric: She has a normal mood and affect. Thought content normal.         ED Course    Procedures  Labs Reviewed   COVID/FLU A&B PCR - Abnormal; Notable for the following components:       Result Value    SARS-CoV-2 PCR Detected (*)     All other components within normal limits    Narrative:     The Xpert Xpress SARS-CoV-2/FLU/RSV plus is a rapid, multiplexed real-time PCR test intended for the simultaneous qualitative detection and differentiation of SARS-CoV-2, Influenza A, Influenza B, and respiratory syncytial virus (RSV) viral RNA in either nasopharyngeal swab or nasal swab specimens.         STREP GROUP A BY PCR - Normal    Narrative:     The Xpert Xpress Strep A test is a rapid, qualitative in vitro diagnostic test for the detection of Streptococcus pyogenes (Group A ß-hemolytic Streptococcus, Strep A) in throat swab specimens from patients with signs and symptoms of pharyngitis.            Imaging Results    None          Medications - No data to display  Medical Decision Making  Amount and/or Complexity of Data Reviewed  Labs: ordered. Decision-making details documented in ED Course.    Risk  OTC drugs.  Prescription drug management.               ED Course as of 09/09/23 1826   Sat Sep 09, 2023   1818 SARS-CoV2 (COVID-19) Qualitative PCR(!): Detected [JA]      ED Course User Index  [JA] Elodia Bledsoe, NP               Medical Decision Making:   Initial Assessment:   Patient awake, alert, has non-labored breathing, and follows commands appropriately. Arrived to ED due to cough, congestion, and sore throat that began on yesterday. Afebrile. NAD noted.  Differential Diagnosis:   COVID, Viral Syndrome, Cough  Clinical Tests:   Lab Tests: Ordered and Reviewed  ED Management:  Co-morbidities and/or factors adding to the complexity or risk for the patient?: none  Differential diagnoses: COVID, Viral Syndrome, Cough  Decision to obtain previous or outside records?: I did not review records.   Chart Review (nursing home, outside records, CareEverywhere): none  Review of RX medications/new RX  prescribed by me?: Prescribed Flonase, Albuterol, Claritin, Tessalon Perles, & Phenol.   Labs/imaging/other tests obtained/considered (risk/benefits of testing discussed): COVID/Flu & Strep  Labs/tests intepretation: COVID +. Informed patient of results.   My independent imaging interpretation: none  Treatment/interventions, IV fluids, IV medications: none  Complex management (IV controlled substances, went to OR, DNR, meds requiring monitoring, transfer, etc)?: none  Workup/treatment affected by social determinants of health?:none   Point of care US done/interpretation: none  Consults/radiologist/EMS/social work/family discussion/alternate history: none  Advanced care planning/end of life discussion: none  Shared decision making: Discussed plan of care and interventions with patient. Agreed to and aware of plan of care. Comfortable being discharged home.   ETOH/smoking/drug cessation discussion: none  Dispo: Patient discharged home. Patient denies new or additional complaints; no further tests indicated at this time. Verbalized understanding of instructions. No emergent or apparent distress noted prior to discharge. To follow up with PCP in 1 week as needed. Strict ER return precautions given.           Clinical Impression:   Final diagnoses:  [U07.1] COVID-19 (Primary)        ED Disposition Condition    Discharge Stable          ED Prescriptions       Medication Sig Dispense Start Date End Date Auth. Provider    fluticasone propionate (FLONASE) 50 mcg/actuation nasal spray 1 spray (50 mcg total) by Each Nostril route 2 (two) times daily as needed for Rhinitis or Allergies. 15 g 9/9/2023 -- Elodia Bledsoe, NP    loratadine (CLARITIN) 10 mg tablet Take 1 tablet (10 mg total) by mouth once daily. 30 tablet 9/9/2023 10/9/2023 Elodia Bledsoe, NP    benzonatate (TESSALON) 100 MG capsule Take 1 capsule (100 mg total) by mouth 3 (three) times daily as needed for Cough. 20 capsule 9/9/2023 -- Elodia Bledsoe, NP     phenoL (CHLORASEPTIC) 1.4 % SprA by Mucous Membrane route every 2 (two) hours. 20 mL 9/9/2023 -- Elodia Bledsoe, NP    albuterol (PROVENTIL/VENTOLIN HFA) 90 mcg/actuation inhaler Inhale 1-2 puffs into the lungs every 6 (six) hours as needed for Wheezing or Shortness of Breath. Rescue 18 g 9/9/2023 9/8/2024 Elodia Bledsoe, NP          Follow-up Information       Follow up With Specialties Details Why Contact Info    Cristela Benson, FNP Family Medicine Call in 1 week If symptoms worsen, As needed [address]  [address]  [facility] [address] [phone]      [facility] - Emergency Dept Emergency Medicine Go to  If symptoms worsen, As needed [id number]  [facility] [address] [phone]             Elodia Bledsoe, NP  09/09/23 4107

## 2023-09-09 NOTE — Clinical Note
"Esthela Bellajune Maddox was seen and treated in our emergency department on 9/9/2023.  She may return to work on 09/13/2023.       If you have any questions or concerns, please don't hesitate to call.      Elodia Bledsoe, NP"

## 2023-10-22 ENCOUNTER — HOSPITAL ENCOUNTER (EMERGENCY)
Facility: HOSPITAL | Age: 28
Discharge: HOME | End: 2023-10-22
Attending: INTERNAL MEDICINE
Payer: MEDICAID

## 2023-10-22 VITALS
SYSTOLIC BLOOD PRESSURE: 168 MMHG | OXYGEN SATURATION: 98 % | HEART RATE: 88 BPM | BODY MASS INDEX: 50.02 KG/M2 | WEIGHT: 293 LBS | DIASTOLIC BLOOD PRESSURE: 88 MMHG | RESPIRATION RATE: 18 BRPM | HEIGHT: 64 IN | TEMPERATURE: 99 F

## 2023-10-22 DIAGNOSIS — J06.9 VIRAL URI WITH COUGH: Primary | ICD-10-CM

## 2023-10-22 LAB
FLUAV AG UPPER RESP QL IA.RAPID: NOT DETECTED
FLUBV AG UPPER RESP QL IA.RAPID: NOT DETECTED
SARS-COV-2 RNA RESP QL NAA+PROBE: NOT DETECTED
STREP A PCR (OHS): NOT DETECTED

## 2023-10-22 PROCEDURE — 87651 STREP A DNA AMP PROBE: CPT | Performed by: INTERNAL MEDICINE

## 2023-10-22 PROCEDURE — 99284 EMERGENCY DEPT VISIT MOD MDM: CPT

## 2023-10-22 PROCEDURE — 63600175 PHARM REV CODE 636 W HCPCS: Performed by: INTERNAL MEDICINE

## 2023-10-22 PROCEDURE — 0240U COVID/FLU A&B PCR: CPT | Performed by: INTERNAL MEDICINE

## 2023-10-22 PROCEDURE — 96372 THER/PROPH/DIAG INJ SC/IM: CPT | Performed by: INTERNAL MEDICINE

## 2023-10-22 RX ORDER — DEXAMETHASONE SODIUM PHOSPHATE 4 MG/ML
10 INJECTION, SOLUTION INTRA-ARTICULAR; INTRALESIONAL; INTRAMUSCULAR; INTRAVENOUS; SOFT TISSUE ONCE
Status: COMPLETED | OUTPATIENT
Start: 2023-10-22 | End: 2023-10-22

## 2023-10-22 RX ORDER — PREDNISONE 20 MG/1
20 TABLET ORAL DAILY
Qty: 5 TABLET | Refills: 0 | Status: SHIPPED | OUTPATIENT
Start: 2023-10-22 | End: 2023-10-27

## 2023-10-22 RX ORDER — DEXAMETHASONE SODIUM PHOSPHATE 4 MG/ML
INJECTION, SOLUTION INTRA-ARTICULAR; INTRALESIONAL; INTRAMUSCULAR; INTRAVENOUS; SOFT TISSUE
Status: DISPENSED
Start: 2023-10-22 | End: 2023-10-23

## 2023-10-22 RX ORDER — AZELASTINE 1 MG/ML
1 SPRAY, METERED NASAL 2 TIMES DAILY
Qty: 30 ML | Refills: 0 | Status: SHIPPED | OUTPATIENT
Start: 2023-10-22 | End: 2023-11-09

## 2023-10-22 RX ORDER — PROMETHAZINE HYDROCHLORIDE AND DEXTROMETHORPHAN HYDROBROMIDE 6.25; 15 MG/5ML; MG/5ML
10 SYRUP ORAL EVERY 6 HOURS PRN
Qty: 180 ML | Refills: 0 | Status: SHIPPED | OUTPATIENT
Start: 2023-10-22 | End: 2023-11-09

## 2023-10-22 RX ADMIN — DEXAMETHASONE SODIUM PHOSPHATE 10 MG: 4 INJECTION, SOLUTION INTRA-ARTICULAR; INTRALESIONAL; INTRAMUSCULAR; INTRAVENOUS; SOFT TISSUE at 11:10

## 2023-10-23 NOTE — ED PROVIDER NOTES
Source of History:  Patient, no limitations    Chief complaint:  Cough (COUGH AND SORE THROAT X2 DAYS; )      HPI:  Esthela Maddox is a 28 y.o. female presenting with Cough (COUGH AND SORE THROAT X2 DAYS; )         Patient presents for evaluation of congestion, sore throat, cough. Onset of symptoms was abrupt starting 2 days ago, and has been stable since that time. Symptoms include congestion. The symptoms are worse with cold symptoms. Fluid intake has been good. Care prior to arrival consisted of  OTC cough and cold        Review of Systems   Constitutional symptoms:  Negative except as documented in HPI.   Skin symptoms:  Negative except as documented in HPI.   HEENT symptoms:  Negative except as documented in HPI.   Respiratory symptoms:  Negative except as documented in HPI.   Cardiovascular symptoms:  Negative except as documented in HPI.   Gastrointestinal symptoms:  Negative except as documented in HPI.    Genitourinary symptoms:  Negative except as documented in HPI.   Musculoskeletal symptoms:  Negative except as documented in HPI.   Neurologic symptoms:  Negative except as documented in HPI.   Psychiatric symptoms:  Negative except as documented in HPI.   Allergy/immunologic symptoms:  Negative except as documented in HPI.             Additional review of systems information: All other systems reviewed and otherwise negative.      Review of patient's allergies indicates:  No Known Allergies    PMH:  As per HPI and below:    Past Medical History:   Diagnosis Date    Hypertension     Morbid obesity     SVT (supraventricular tachycardia)         Family History   Problem Relation Age of Onset    Diabetes Mother     Hypertension Mother     Diabetes Sister     Stroke Sister     Hypertension Sister        History reviewed. No pertinent surgical history.    Social History     Tobacco Use    Smoking status: Never    Smokeless tobacco: Never   Substance Use Topics    Alcohol use: Not Currently      "Comment: ocass    Drug use: Yes     Frequency: 2.0 times per week     Types: Marijuana       Patient Active Problem List   Diagnosis    Elevated blood-pressure reading, without diagnosis of hypertension    Hemorrhoids, external    Morbid obesity    Syphilis    SVT (supraventricular tachycardia)    Chest pain    Encounter to establish care    Hypertension    Depression    Vitamin D deficiency        Physical Exam:    BP (!) 168/88 (Patient Position: Lying)   Pulse 88   Temp 98.7 °F (37.1 °C)   Resp 18   Ht 5' 4" (1.626 m)   Wt (!) 181.4 kg (400 lb)   LMP 10/21/2023   SpO2 98%   BMI 68.66 kg/m²     Nursing note and vital signs reviewed.    General:  Alert, no acute distress.   Skin: Normal for Ethnic Origin, No cyanosis  HEENT: Normocephalic and atraumatic, Vision unchanged, Pupils symmetric, No icterus , Nasal mucosa is pink and moist  Cardiovascular:  Regular rate and rhythm, No edema  Chest Wall: No deformity, equal chest rise  Respiratory:  Lungs are clear to auscultation, respirations are non-labored.    Musculoskeletal:  No deformity, Normal perfusion to all extremities  Gastrointestinal:  Soft, Non distended  Neurological:  Alert and oriented, normal motor observed, normal speech observed.    Psychiatric:  Cooperative, appropriate mood & affect.        Labs that have been ordered have been independently reviewed and interpreted by myself.     Old Chart Reviewed.      Initial Impression/ Differential Dx:  Viral upper respiratory infection, sinusitis, allergic rhinitis, bronchitis, influenza, pneumonia, dental infection, pharyngitis       MDM:      Reviewed Nurses Note.    Reviewed Pertinent old records.    Orders Placed This Encounter    COVID/FLU A&B PCR    Strep Group A by PCR    dexAMETHasone injection 10 mg    azelastine (ASTELIN) 137 mcg (0.1 %) nasal spray    promethazine-dextromethorphan (PROMETHAZINE-DM) 6.25-15 mg/5 mL Syrp    predniSONE (DELTASONE) 20 MG tablet                    Labs Reviewed "   COVID/FLU A&B PCR - Normal    Narrative:     The Xpert Xpress SARS-CoV-2/FLU/RSV plus is a rapid, multiplexed real-time PCR test intended for the simultaneous qualitative detection and differentiation of SARS-CoV-2, Influenza A, Influenza B, and respiratory syncytial virus (RSV) viral RNA in either nasopharyngeal swab or nasal swab specimens.         STREP GROUP A BY PCR - Normal    Narrative:     The Xpert Xpress Strep A test is a rapid, qualitative in vitro diagnostic test for the detection of Streptococcus pyogenes (Group A ß-hemolytic Streptococcus, Strep A) in throat swab specimens from patients with signs and symptoms of pharyngitis.            No orders to display        Admission on 10/22/2023, Discharged on 10/22/2023   Component Date Value Ref Range Status    Influenza A PCR 10/22/2023 Not Detected  Not Detected Final    Influenza B PCR 10/22/2023 Not Detected  Not Detected Final    SARS-CoV-2 PCR 10/22/2023 Not Detected  Not Detected, Negative, Invalid Final    STREP A PCR ([facility]) 10/22/2023 Not Detected  Not Detected Final       Imaging Results    None                                              Diagnostic Impression:    1. Viral URI with cough         ED Disposition Condition    Discharge Stable             Follow-up Information       [facility] - Emergency Dept.    Specialty: Emergency Medicine  Why: If symptoms worsen  Contact information:  [address]  [facility] [address] [phone]                            ED Prescriptions       Medication Sig Dispense Start Date End Date Auth. Provider    azelastine (ASTELIN) 137 mcg (0.1 %) nasal spray 1 spray (137 mcg total) by Nasal route 2 (two) times daily. 30 mL 10/22/2023 -- En Gonzales DO    promethazine-dextromethorphan (PROMETHAZINE-DM) 6.25-15 mg/5 mL Syrp Take 10 mLs by mouth every 6 (six) hours as needed (cough). 180 mL 10/22/2023 -- En Gonzales DO    predniSONE (DELTASONE) 20 MG  tablet Take 1 tablet (20 mg total) by mouth once daily. for 5 days 5 tablet 10/22/2023 10/27/2023 En Gonzales, DO          Follow-up Information       Follow up With Specialties Details Why Contact Info    [facility] - Emergency Dept Emergency Medicine  If symptoms worsen [id number]  [facility] [address]  [phone]             En Gonzales, DO  10/23/23 0037

## 2023-11-03 ENCOUNTER — TELEPHONE (OUTPATIENT)
Dept: GYNECOLOGY | Facility: CLINIC | Age: 28
End: 2023-11-03
Payer: MEDICAID

## 2023-11-03 NOTE — TELEPHONE ENCOUNTER
----- Message from Malissa Jiang sent at 11/3/2023 11:59 AM CDT -----  Regarding: HPV  Pt missed HPV visit 11/2/23 (&10/19/23) and would like to reschedule.   Pt asking what this visit is for, she is unfamiliar with HPV. I am not sure of where pt stands in the series of shots. Please consult pt.

## 2023-11-09 ENCOUNTER — OFFICE VISIT (OUTPATIENT)
Dept: FAMILY MEDICINE | Facility: CLINIC | Age: 28
End: 2023-11-09
Payer: MEDICAID

## 2023-11-09 VITALS
HEART RATE: 88 BPM | BODY MASS INDEX: 50.02 KG/M2 | DIASTOLIC BLOOD PRESSURE: 81 MMHG | TEMPERATURE: 98 F | HEIGHT: 64 IN | WEIGHT: 293 LBS | RESPIRATION RATE: 18 BRPM | SYSTOLIC BLOOD PRESSURE: 117 MMHG | OXYGEN SATURATION: 98 %

## 2023-11-09 DIAGNOSIS — F32.A DEPRESSION, UNSPECIFIED DEPRESSION TYPE: Primary | ICD-10-CM

## 2023-11-09 DIAGNOSIS — E66.01 MORBID OBESITY: ICD-10-CM

## 2023-11-09 DIAGNOSIS — F41.9 ANXIETY: ICD-10-CM

## 2023-11-09 PROCEDURE — 3044F HG A1C LEVEL LT 7.0%: CPT | Mod: CPTII,,, | Performed by: NURSE PRACTITIONER

## 2023-11-09 PROCEDURE — 99213 PR OFFICE/OUTPT VISIT, EST, LEVL III, 20-29 MIN: ICD-10-PCS | Mod: ,,, | Performed by: NURSE PRACTITIONER

## 2023-11-09 PROCEDURE — 1160F RVW MEDS BY RX/DR IN RCRD: CPT | Mod: CPTII,,, | Performed by: NURSE PRACTITIONER

## 2023-11-09 PROCEDURE — 3074F SYST BP LT 130 MM HG: CPT | Mod: CPTII,,, | Performed by: NURSE PRACTITIONER

## 2023-11-09 PROCEDURE — 1159F MED LIST DOCD IN RCRD: CPT | Mod: CPTII,,, | Performed by: NURSE PRACTITIONER

## 2023-11-09 PROCEDURE — 3044F PR MOST RECENT HEMOGLOBIN A1C LEVEL <7.0%: ICD-10-PCS | Mod: CPTII,,, | Performed by: NURSE PRACTITIONER

## 2023-11-09 PROCEDURE — 1159F PR MEDICATION LIST DOCUMENTED IN MEDICAL RECORD: ICD-10-PCS | Mod: CPTII,,, | Performed by: NURSE PRACTITIONER

## 2023-11-09 PROCEDURE — 3079F PR MOST RECENT DIASTOLIC BLOOD PRESSURE 80-89 MM HG: ICD-10-PCS | Mod: CPTII,,, | Performed by: NURSE PRACTITIONER

## 2023-11-09 PROCEDURE — 3008F BODY MASS INDEX DOCD: CPT | Mod: CPTII,,, | Performed by: NURSE PRACTITIONER

## 2023-11-09 PROCEDURE — 3079F DIAST BP 80-89 MM HG: CPT | Mod: CPTII,,, | Performed by: NURSE PRACTITIONER

## 2023-11-09 PROCEDURE — 3060F PR POS MICROALBUMINURIA RESULT DOCUMENTED/REVIEW: ICD-10-PCS | Mod: CPTII,,, | Performed by: NURSE PRACTITIONER

## 2023-11-09 PROCEDURE — 3074F PR MOST RECENT SYSTOLIC BLOOD PRESSURE < 130 MM HG: ICD-10-PCS | Mod: CPTII,,, | Performed by: NURSE PRACTITIONER

## 2023-11-09 PROCEDURE — 3066F NEPHROPATHY DOC TX: CPT | Mod: CPTII,,, | Performed by: NURSE PRACTITIONER

## 2023-11-09 PROCEDURE — 3060F POS MICROALBUMINURIA REV: CPT | Mod: CPTII,,, | Performed by: NURSE PRACTITIONER

## 2023-11-09 PROCEDURE — 3008F PR BODY MASS INDEX (BMI) DOCUMENTED: ICD-10-PCS | Mod: CPTII,,, | Performed by: NURSE PRACTITIONER

## 2023-11-09 PROCEDURE — 99213 OFFICE O/P EST LOW 20 MIN: CPT | Mod: ,,, | Performed by: NURSE PRACTITIONER

## 2023-11-09 PROCEDURE — 1160F PR REVIEW ALL MEDS BY PRESCRIBER/CLIN PHARMACIST DOCUMENTED: ICD-10-PCS | Mod: CPTII,,, | Performed by: NURSE PRACTITIONER

## 2023-11-09 PROCEDURE — 3066F PR DOCUMENTATION OF TREATMENT FOR NEPHROPATHY: ICD-10-PCS | Mod: CPTII,,, | Performed by: NURSE PRACTITIONER

## 2023-11-09 NOTE — PATIENT INSTRUCTIONS
Andres Proctor,     If you are due for any health screening(s) below please notify me so we can arrange them to be ordered and scheduled. Most healthy patients at your age complete them, but you are free to accept or refuse.     If you can't do it, I'll definitely understand. If you can, I'd certainly appreciate it!    All of your core healthy metrics are met.

## 2023-11-17 ENCOUNTER — HOSPITAL ENCOUNTER (EMERGENCY)
Facility: HOSPITAL | Age: 28
Discharge: HOME | End: 2023-11-17
Attending: EMERGENCY MEDICINE
Payer: MEDICAID

## 2023-11-17 VITALS
HEIGHT: 64 IN | HEART RATE: 110 BPM | DIASTOLIC BLOOD PRESSURE: 93 MMHG | RESPIRATION RATE: 28 BRPM | TEMPERATURE: 99 F | BODY MASS INDEX: 50.02 KG/M2 | WEIGHT: 293 LBS | SYSTOLIC BLOOD PRESSURE: 157 MMHG | OXYGEN SATURATION: 99 %

## 2023-11-17 DIAGNOSIS — R07.9 CHEST PAIN: ICD-10-CM

## 2023-11-17 DIAGNOSIS — F43.9 STRESS: ICD-10-CM

## 2023-11-17 DIAGNOSIS — R07.89 ATYPICAL CHEST PAIN: Primary | ICD-10-CM

## 2023-11-17 LAB
ALBUMIN SERPL-MCNC: 3.6 G/DL (ref 3.5–5)
ALBUMIN/GLOB SERPL: 0.9 RATIO (ref 1.1–2)
ALP SERPL-CCNC: 84 UNIT/L (ref 40–150)
ALT SERPL-CCNC: 40 UNIT/L (ref 0–55)
AST SERPL-CCNC: 28 UNIT/L (ref 5–34)
BASOPHILS # BLD AUTO: 0.05 X10(3)/MCL
BASOPHILS NFR BLD AUTO: 0.7 %
BILIRUB SERPL-MCNC: 0.5 MG/DL
BUN SERPL-MCNC: 8.3 MG/DL (ref 7–18.7)
CALCIUM SERPL-MCNC: 9.8 MG/DL (ref 8.4–10.2)
CHLORIDE SERPL-SCNC: 107 MMOL/L (ref 98–107)
CO2 SERPL-SCNC: 26 MMOL/L (ref 22–29)
CREAT SERPL-MCNC: 0.67 MG/DL (ref 0.55–1.02)
EOSINOPHIL # BLD AUTO: 0.12 X10(3)/MCL (ref 0–0.9)
EOSINOPHIL NFR BLD AUTO: 1.6 %
ERYTHROCYTE [DISTWIDTH] IN BLOOD BY AUTOMATED COUNT: 12.8 % (ref 11.5–17)
GFR SERPLBLD CREATININE-BSD FMLA CKD-EPI: >60 MLS/MIN/1.73/M2
GLOBULIN SER-MCNC: 4 GM/DL (ref 2.4–3.5)
GLUCOSE SERPL-MCNC: 85 MG/DL (ref 74–100)
HCT VFR BLD AUTO: 41.6 % (ref 37–47)
HGB BLD-MCNC: 13.8 G/DL (ref 12–16)
IMM GRANULOCYTES # BLD AUTO: 0.01 X10(3)/MCL (ref 0–0.04)
IMM GRANULOCYTES NFR BLD AUTO: 0.1 %
LYMPHOCYTES # BLD AUTO: 2.6 X10(3)/MCL (ref 0.6–4.6)
LYMPHOCYTES NFR BLD AUTO: 35.3 %
MCH RBC QN AUTO: 28.8 PG (ref 27–31)
MCHC RBC AUTO-ENTMCNC: 33.2 G/DL (ref 33–36)
MCV RBC AUTO: 86.8 FL (ref 80–94)
MONOCYTES # BLD AUTO: 0.56 X10(3)/MCL (ref 0.1–1.3)
MONOCYTES NFR BLD AUTO: 7.6 %
NEUTROPHILS # BLD AUTO: 4.02 X10(3)/MCL (ref 2.1–9.2)
NEUTROPHILS NFR BLD AUTO: 54.7 %
NRBC BLD AUTO-RTO: 0 %
PLATELET # BLD AUTO: 305 X10(3)/MCL (ref 130–400)
PMV BLD AUTO: 11 FL (ref 7.4–10.4)
POTASSIUM SERPL-SCNC: 3.8 MMOL/L (ref 3.5–5.1)
PROT SERPL-MCNC: 7.6 GM/DL (ref 6.4–8.3)
RBC # BLD AUTO: 4.79 X10(6)/MCL (ref 4.2–5.4)
SODIUM SERPL-SCNC: 141 MMOL/L (ref 136–145)
TROPONIN I SERPL-MCNC: 0.01 NG/ML (ref 0–0.04)
WBC # SPEC AUTO: 7.36 X10(3)/MCL (ref 4.5–11.5)

## 2023-11-17 PROCEDURE — 63600175 PHARM REV CODE 636 W HCPCS: Performed by: EMERGENCY MEDICINE

## 2023-11-17 PROCEDURE — 99285 EMERGENCY DEPT VISIT HI MDM: CPT | Mod: 25

## 2023-11-17 PROCEDURE — 93005 ELECTROCARDIOGRAM TRACING: CPT

## 2023-11-17 PROCEDURE — 80053 COMPREHEN METABOLIC PANEL: CPT | Performed by: EMERGENCY MEDICINE

## 2023-11-17 PROCEDURE — 93010 ELECTROCARDIOGRAM REPORT: CPT | Mod: ,,, | Performed by: INTERNAL MEDICINE

## 2023-11-17 PROCEDURE — 96374 THER/PROPH/DIAG INJ IV PUSH: CPT

## 2023-11-17 PROCEDURE — 84484 ASSAY OF TROPONIN QUANT: CPT | Performed by: EMERGENCY MEDICINE

## 2023-11-17 PROCEDURE — 96375 TX/PRO/DX INJ NEW DRUG ADDON: CPT

## 2023-11-17 PROCEDURE — 93010 EKG 12-LEAD: ICD-10-PCS | Mod: ,,, | Performed by: INTERNAL MEDICINE

## 2023-11-17 PROCEDURE — 85025 COMPLETE CBC W/AUTO DIFF WBC: CPT | Performed by: EMERGENCY MEDICINE

## 2023-11-17 RX ORDER — HYDROXYZINE PAMOATE 50 MG/1
50 CAPSULE ORAL 4 TIMES DAILY
Qty: 40 CAPSULE | Refills: 0 | Status: SHIPPED | OUTPATIENT
Start: 2023-11-17 | End: 2023-11-27

## 2023-11-17 RX ORDER — KETOROLAC TROMETHAMINE 10 MG/1
10 TABLET, FILM COATED ORAL EVERY 6 HOURS
Qty: 20 TABLET | Refills: 0 | Status: SHIPPED | OUTPATIENT
Start: 2023-11-17 | End: 2023-11-22

## 2023-11-17 RX ORDER — KETOROLAC TROMETHAMINE 30 MG/ML
30 INJECTION, SOLUTION INTRAMUSCULAR; INTRAVENOUS
Status: COMPLETED | OUTPATIENT
Start: 2023-11-17 | End: 2023-11-17

## 2023-11-17 RX ORDER — LORAZEPAM 2 MG/ML
1 INJECTION INTRAMUSCULAR
Status: COMPLETED | OUTPATIENT
Start: 2023-11-17 | End: 2023-11-17

## 2023-11-17 RX ADMIN — KETOROLAC TROMETHAMINE 30 MG: 30 INJECTION, SOLUTION INTRAMUSCULAR; INTRAVENOUS at 06:11

## 2023-11-17 RX ADMIN — LORAZEPAM 1 MG: 2 INJECTION INTRAMUSCULAR; INTRAVENOUS at 06:11

## 2023-11-17 NOTE — ED PROVIDER NOTES
"Encounter Date: 11/17/2023       History     Chief Complaint   Patient presents with    Chest Pain     Reports chest tightness, feeling of fast heart rate, and SOB starting 15 mins pta. Hx of SVT      The history is provided by the patient.   Chest Pain  The current episode started just prior to arrival. Chest pain occurs constantly. The chest pain is unchanged. The quality of the pain is described as dull. The pain does not radiate. Primary symptoms include palpitations. Pertinent negatives for primary symptoms include no fever, no shortness of breath and no nausea.   The palpitations began just prior to arrival. An episode of palpitations lasts for 11 to 30 minutes. The palpitations did not occur with shortness of breath.   Pertinent negatives for associated symptoms include no weakness. Risk factors include obesity.   Patient has prior h/o SVT.  Compliant with her metoprolol  Admits to "a lot of stress" lately.    Review of patient's allergies indicates:  No Known Allergies  Past Medical History:   Diagnosis Date    Hypertension     Morbid obesity     SVT (supraventricular tachycardia)      No past surgical history on file.  Family History   Problem Relation Age of Onset    Diabetes Mother     Hypertension Mother     Diabetes Sister     Stroke Sister     Hypertension Sister      Social History     Tobacco Use    Smoking status: Never    Smokeless tobacco: Never   Substance Use Topics    Alcohol use: Not Currently     Comment: ocass    Drug use: Yes     Frequency: 2.0 times per week     Types: Marijuana     Review of Systems   Constitutional:  Negative for fever.   HENT:  Negative for sore throat.    Respiratory:  Negative for shortness of breath.    Cardiovascular:  Positive for chest pain and palpitations.   Gastrointestinal:  Negative for nausea.   Genitourinary:  Negative for dysuria.   Musculoskeletal:  Negative for back pain.   Skin:  Negative for rash.   Neurological:  Negative for weakness. "   Hematological:  Does not bruise/bleed easily.       Physical Exam     Initial Vitals [11/17/23 1737]   BP Pulse Resp Temp SpO2   (!) 157/93 110 (!) 28 99 °F (37.2 °C) 99 %      MAP       --         Physical Exam    Nursing note and vitals reviewed.  Constitutional: She appears well-developed and well-nourished. She appears distressed.   HENT:   Head: Normocephalic and atraumatic.   Right Ear: External ear normal.   Left Ear: External ear normal.   Eyes: Conjunctivae and EOM are normal. Pupils are equal, round, and reactive to light.   Neck: Neck supple.   Normal range of motion.  Cardiovascular:  Normal rate, regular rhythm, normal heart sounds and intact distal pulses.               Pain is reproducible with light palpation of chest wall   Pulmonary/Chest: Breath sounds normal.   Abdominal: Abdomen is soft. Bowel sounds are normal.   obese   Musculoskeletal:         General: Normal range of motion.      Cervical back: Normal range of motion and neck supple.     Neurological: She is alert and oriented to person, place, and time. GCS score is 15. GCS eye subscore is 4. GCS verbal subscore is 5. GCS motor subscore is 6.   Skin: Skin is warm and dry. Capillary refill takes less than 2 seconds.   Psychiatric: Her behavior is normal. Judgment and thought content normal. Her mood appears anxious.         ED Course   Procedures  Labs Reviewed   COMPREHENSIVE METABOLIC PANEL - Abnormal; Notable for the following components:       Result Value    Globulin 4.0 (*)     Albumin/Globulin Ratio 0.9 (*)     All other components within normal limits   CBC WITH DIFFERENTIAL - Abnormal; Notable for the following components:    MPV 11.0 (*)     All other components within normal limits   TROPONIN I - Normal   CBC W/ AUTO DIFFERENTIAL    Narrative:     The following orders were created for panel order CBC auto differential.  Procedure                               Abnormality         Status                     ---------                                -----------         ------                     CBC with Differential[[id number]]       Abnormal            Final result                 Please view results for these tests on the individual orders.     EKG Readings: (Independently Interpreted)   Initial Reading: No STEMI. Rhythm: Sinus Tachycardia. Heart Rate: 104. Ectopy: No Ectopy. Conduction: Normal. ST Segments: Normal ST Segments. T Waves: Normal. Clinical Impression: Sinus Tachycardia       Imaging Results              X-Ray Chest AP Portable (Final result)  Result time 11/17/23 18:00:02      Final result by Quirino Lees MD (11/17/23 18:00:02)                   Impression:      No acute findings.      Electronically signed by: Quirino Lees  Date:    11/17/2023  Time:    18:00               Narrative:    EXAMINATION:  XR CHEST AP PORTABLE    CLINICAL HISTORY:  chest pain;    COMPARISON:  22 May 2023    FINDINGS:  Portable frontal view of the chest was obtained. The heart is not enlarged.  Lungs are clear.  There is no pneumothorax or significant effusion.                                       Medications   LORazepam injection 1 mg (1 mg Intravenous Given 11/17/23 1810)   ketorolac injection 30 mg (30 mg Intravenous Given 11/17/23 1810)     Medical Decision Making  Amount and/or Complexity of Data Reviewed  Labs: ordered. Decision-making details documented in ED Course.  Radiology: ordered and independent interpretation performed. Decision-making details documented in ED Course.  ECG/medicine tests: ordered and independent interpretation performed. Decision-making details documented in ED Course.    Risk  Prescription drug management.  Parenteral controlled substances.    Differential includes:  angina, dysrhythmia, anxiety, GERD, MSK pain, PNA, effusion, CHF, PE.  Will obtain EKG, CXR, CBC, CMP, troponin and give analgesic and anxiolytic.                               Clinical Impression:  Final diagnoses:  [R07.9] Chest pain  [R07.89]  Atypical chest pain (Primary)  [F43.9] Stress          ED Disposition Condition    Discharge Stable          ED Prescriptions       Medication Sig Dispense Start Date End Date Auth. Provider    ketorolac (TORADOL) 10 mg tablet Take 1 tablet (10 mg total) by mouth every 6 (six) hours. for 5 days 20 tablet 11/17/2023 11/22/2023 Bishop Barbour MD    hydrOXYzine pamoate (VISTARIL) 50 MG Cap Take 1 capsule (50 mg total) by mouth 4 (four) times daily. for 10 days 40 capsule 11/17/2023 11/27/2023 Bishop Barbour MD          Follow-up Information       Follow up With Specialties Details Why Contact Info    Follow up with your primary MD in 3-5 days if not improved.  Return to ED for worsening symptoms.                 Bishop Barbour MD  11/17/23 3891

## 2023-11-21 DIAGNOSIS — I10 HYPERTENSION, UNSPECIFIED TYPE: ICD-10-CM

## 2023-11-22 RX ORDER — ASPIRIN 81 MG/1
81 TABLET ORAL DAILY
Qty: 30 TABLET | Refills: 11 | Status: SHIPPED | OUTPATIENT
Start: 2023-11-22 | End: 2024-03-27 | Stop reason: SDUPTHER

## 2023-11-22 RX ORDER — METOPROLOL TARTRATE 25 MG/1
25 TABLET, FILM COATED ORAL 2 TIMES DAILY
Qty: 60 TABLET | Refills: 11 | Status: SHIPPED | OUTPATIENT
Start: 2023-11-22 | End: 2024-03-27 | Stop reason: SDUPTHER

## 2023-12-12 PROBLEM — F41.9 ANXIETY: Status: ACTIVE | Noted: 2023-12-12

## 2023-12-13 NOTE — PROGRESS NOTES
"   Patient ID: [id number]     Chief Complaint: Anxiety (fu) and Depression      HPI:     Esthela Maddox is a 28 y.o. female here today for a follow up. No other complaints today.       Past Medical History:  has a past medical history of Hypertension, Morbid obesity, and SVT (supraventricular tachycardia).    Surgical History:  has no past surgical history on file.    Family History: family history includes Diabetes in her mother and sister; Hypertension in her mother and sister; Stroke in her sister.    Social History:  reports that she has never smoked. She has never used smokeless tobacco. She reports that she does not currently use alcohol. She reports current drug use. Frequency: 2.00 times per week. Drug: Marijuana.    Current Outpatient Medications   Medication Instructions    aspirin (ECOTRIN) 81 mg, Oral, Daily    ibuprofen (ADVIL,MOTRIN) 800 mg, Oral, Every 6 hours PRN    loratadine (CLARITIN) 10 mg, Oral, Daily    METOPROLOL SUCCINATE ORAL metoprolol succinate Take No date recorded No form recorded No frequency recorded No route recorded No set duration recorded No set duration amount recorded active No dosage strength recorded No dosage strength units of measure recorded    metoprolol tartrate (LOPRESSOR) 25 mg, Oral, 2 times daily    naproxen (NAPROSYN) 500 MG tablet naproxen Take 1 Tablet (oral) every 12 hours for 15 days [id number] tablet every 12 hours oral 15 days active 500 mg       Patient has No Known Allergies.     Patient Care Team:  Cristela Benson FNP as PCP - General (Nurse Practitioner)  Liliane Garcia ANP as Nurse Practitioner (Gynecology)       Subjective:     Review of Systems    12 point review of systems conducted, negative except as stated in the history of present illness. See HPI for details.      Objective:     Visit Vitals  /81 (BP Location: Left arm, Patient Position: Sitting)   Pulse 88   Temp 98.4 °F (36.9 °C) (Oral)   Resp 18   Ht 5' 4" (1.626 m)   Wt (!) 193.1 " kg (425 lb 12.8 oz)   LMP 11/04/2023 (Exact Date)   SpO2 98%   BMI 73.09 kg/m²       Physical Exam    General: Alert and oriented, No acute distress.  Head: Normocephalic, Atraumatic.  Eye: Pupils are equal, round and reactive to light, Extraocular movements are intact, Sclera non-icteric.  Ears/Nose/Throat: Normal, Mucosa moist,Clear.  Neck/Thyroid: Supple, Non-tender, No carotid bruit, No lymphadenopathy, No JVD, Full range of motion.  Respiratory: Clear to auscultation bilaterally; No wheezes, rales or rhonchi,Non-labored respirations, Symmetrical chest wall expansion.  Cardiovascular: Regular rate and rhythm, S1/S2 normal, No murmurs, rubs or gallops.  Gastrointestinal: Soft, Non-tender, Non-distended, Normal bowel sounds, No palpable organomegaly.  Musculoskeletal: Normal range of motion.  Integumentary: Warm, Dry, Intact, No suspicious lesions or rashes.  Extremities: No clubbing, cyanosis or edema  Neurologic: No focal deficits, Cranial Nerves II-XII are grossly intact, Motor strength normal upper and lower extremities, Sensory exam intact.  Psychiatric: Normal interaction, Coherent speech, Euthymic mood, Appropriate affect     Labs Reviewed:     Chemistry:  Lab Results   Component Value Date     11/17/2023    K 3.8 11/17/2023    CHLORIDE 107 11/17/2023    BUN 8.3 11/17/2023    CREATININE 0.67 11/17/2023    EGFRNORACEVR >60 11/17/2023    GLUCOSE 85 11/17/2023    CALCIUM 9.8 11/17/2023    ALKPHOS 84 11/17/2023    LABPROT 7.6 11/17/2023    ALBUMIN 3.6 11/17/2023    BILIDIR 0.2 05/31/2023    IBILI 0.20 05/31/2023    AST 28 11/17/2023    ALT 40 11/17/2023    MG 2.10 07/01/2022    [id number] 5.4 (L) 02/01/2023    TSH 2.026 02/01/2023        Lab Results   Component Value Date    HGBA1C 5.8 02/01/2023        Hematology:  Lab Results   Component Value Date    WBC 7.36 11/17/2023    HGB 13.8 11/17/2023    HCT 41.6 11/17/2023     11/17/2023       Lipid Panel:  Lab Results   Component Value Date    CHOL  163 02/01/2023    HDL 37 02/01/2023    .00 02/01/2023    TRIG 109 02/01/2023    TOTALCHOLEST 4 02/01/2023        Urine:  Lab Results   Component Value Date    COLORUA Yellow 05/11/2022    APPEARANCEUA Clear 05/11/2022    SGUA >=1.030 05/11/2022    PHUA 5.5 05/11/2022    PROTEINUA Negative 05/11/2022    GLUCOSEUA Negative 05/11/2022    KETONESUA Negative 05/11/2022    BLOODUA Negative 05/11/2022    NITRITESUA Negative 05/11/2022    LEUKOCYTESUR Trace (A) 05/11/2022    RBCUA None Seen 05/11/2022    WBCUA 0-2 05/11/2022    BACTERIA Moderate (A) 05/11/2022    CREATRANDUR 241.3 (H) 02/01/2023          Assessment:     No diagnosis found.     Plan:     1. Depression, unspecified depression type  Stable.  Exercise daily. Get sunlight daily.  Practice positive phrases and repeat throughout the day, along with yoga and relaxation techniques.  Establish good social support, make changes to reduce stress.  Reports any symptoms of suicidal/homicidal ideations or self harm immediately, if clinic is closed go to nearest emergency room.    2. Anxiety  Stable.   Practice deep breathing or abdominal breathing exercises when anxiety occurs.  Exercise daily. Get sunlight daily.  Avoid caffeine, alcohol and stimulants.  Practice positive phrases and repeat throughout the day, along with yoga and relaxation techniques.  Set healthy boundaries, avoid people and conversations that increase stress.  Reports any symptoms of suicidal or homicidal ideations immediately, if clinic is closed go to nearest emergency room.    3. Morbid obesity  Body mass index is 73.09 kg/m².  Goal BMI <30.  Exercise 5 times a week for 30 minutes per day.  Avoid soda, simple sugars, excessive rice, potatoes or bread. Limit fast foods and fried foods.  Choose complex carbs in moderation (example: green vegetables, beans, oatmeal). Eat plenty of fresh fruits and vegetables with lean meats daily.  Do not skip meals. Eat a balanced portion size.  Avoid fad  diets. Consider permanent healthy life style changes.         Follow up in about 3 months (around 2/9/2024) for Wellness. In addition to their scheduled follow up, the patient has also been instructed to follow up on as needed basis.     Future Appointments   Date Time Provider Department Center   1/11/2024  2:00 PM NURSE, [facility] [place]    2/12/2024  1:00 PM Cristela Benson FNP [facility]   3/1/2024 10:45 AM RESIDENTS, [facility] [place]    6/27/2024  1:30 PM [facility]

## 2024-01-03 ENCOUNTER — HOSPITAL ENCOUNTER (EMERGENCY)
Facility: HOSPITAL | Age: 29
Discharge: HOME | End: 2024-01-03
Attending: STUDENT IN AN ORGANIZED HEALTH CARE EDUCATION/TRAINING PROGRAM
Payer: MEDICAID

## 2024-01-03 VITALS
HEIGHT: 64 IN | OXYGEN SATURATION: 99 % | RESPIRATION RATE: 20 BRPM | BODY MASS INDEX: 50.02 KG/M2 | SYSTOLIC BLOOD PRESSURE: 155 MMHG | HEART RATE: 106 BPM | TEMPERATURE: 99 F | WEIGHT: 293 LBS | DIASTOLIC BLOOD PRESSURE: 96 MMHG

## 2024-01-03 DIAGNOSIS — B34.9 ACUTE VIRAL SYNDROME: Primary | ICD-10-CM

## 2024-01-03 DIAGNOSIS — R42 DIZZINESS: ICD-10-CM

## 2024-01-03 LAB
ALBUMIN SERPL-MCNC: 3.4 G/DL (ref 3.5–5)
ALBUMIN/GLOB SERPL: 0.9 RATIO (ref 1.1–2)
ALP SERPL-CCNC: 80 UNIT/L (ref 40–150)
ALT SERPL-CCNC: 39 UNIT/L (ref 0–55)
AST SERPL-CCNC: 24 UNIT/L (ref 5–34)
BASOPHILS # BLD AUTO: 0.04 X10(3)/MCL
BASOPHILS NFR BLD AUTO: 0.5 %
BILIRUB SERPL-MCNC: 0.3 MG/DL
BUN SERPL-MCNC: 10.8 MG/DL (ref 7–18.7)
CALCIUM SERPL-MCNC: 9.3 MG/DL (ref 8.4–10.2)
CHLORIDE SERPL-SCNC: 108 MMOL/L (ref 98–107)
CO2 SERPL-SCNC: 23 MMOL/L (ref 22–29)
CREAT SERPL-MCNC: 0.82 MG/DL (ref 0.55–1.02)
EOSINOPHIL # BLD AUTO: 0.14 X10(3)/MCL (ref 0–0.9)
EOSINOPHIL NFR BLD AUTO: 1.7 %
ERYTHROCYTE [DISTWIDTH] IN BLOOD BY AUTOMATED COUNT: 13.1 % (ref 11.5–17)
FLUAV AG UPPER RESP QL IA.RAPID: NOT DETECTED
FLUBV AG UPPER RESP QL IA.RAPID: NOT DETECTED
GFR SERPLBLD CREATININE-BSD FMLA CKD-EPI: >60 MLS/MIN/1.73/M2
GLOBULIN SER-MCNC: 3.7 GM/DL (ref 2.4–3.5)
GLUCOSE SERPL-MCNC: 97 MG/DL (ref 74–100)
HCT VFR BLD AUTO: 40 % (ref 37–47)
HGB BLD-MCNC: 13.2 G/DL (ref 12–16)
IMM GRANULOCYTES # BLD AUTO: 0.02 X10(3)/MCL (ref 0–0.04)
IMM GRANULOCYTES NFR BLD AUTO: 0.2 %
LYMPHOCYTES # BLD AUTO: 1.85 X10(3)/MCL (ref 0.6–4.6)
LYMPHOCYTES NFR BLD AUTO: 22.6 %
MCH RBC QN AUTO: 28.9 PG (ref 27–31)
MCHC RBC AUTO-ENTMCNC: 33 G/DL (ref 33–36)
MCV RBC AUTO: 87.5 FL (ref 80–94)
MONOCYTES # BLD AUTO: 0.63 X10(3)/MCL (ref 0.1–1.3)
MONOCYTES NFR BLD AUTO: 7.7 %
NEUTROPHILS # BLD AUTO: 5.49 X10(3)/MCL (ref 2.1–9.2)
NEUTROPHILS NFR BLD AUTO: 67.3 %
NRBC BLD AUTO-RTO: 0 %
PLATELET # BLD AUTO: 259 X10(3)/MCL (ref 130–400)
PMV BLD AUTO: 10.9 FL (ref 7.4–10.4)
POTASSIUM SERPL-SCNC: 4.1 MMOL/L (ref 3.5–5.1)
PROT SERPL-MCNC: 7.1 GM/DL (ref 6.4–8.3)
RBC # BLD AUTO: 4.57 X10(6)/MCL (ref 4.2–5.4)
SARS-COV-2 RNA RESP QL NAA+PROBE: NOT DETECTED
SODIUM SERPL-SCNC: 140 MMOL/L (ref 136–145)
STREP A PCR (OHS): NOT DETECTED
TROPONIN I SERPL-MCNC: <0.01 NG/ML (ref 0–0.04)
WBC # SPEC AUTO: 8.17 X10(3)/MCL (ref 4.5–11.5)

## 2024-01-03 PROCEDURE — 99284 EMERGENCY DEPT VISIT MOD MDM: CPT

## 2024-01-03 PROCEDURE — 0240U COVID/FLU A&B PCR: CPT | Performed by: PHYSICIAN ASSISTANT

## 2024-01-03 PROCEDURE — 85025 COMPLETE CBC W/AUTO DIFF WBC: CPT | Performed by: PHYSICIAN ASSISTANT

## 2024-01-03 PROCEDURE — 93005 ELECTROCARDIOGRAM TRACING: CPT

## 2024-01-03 PROCEDURE — 93010 ELECTROCARDIOGRAM REPORT: CPT | Mod: ,,, | Performed by: INTERNAL MEDICINE

## 2024-01-03 PROCEDURE — 25000003 PHARM REV CODE 250: Performed by: PHYSICIAN ASSISTANT

## 2024-01-03 PROCEDURE — 84484 ASSAY OF TROPONIN QUANT: CPT | Performed by: PHYSICIAN ASSISTANT

## 2024-01-03 PROCEDURE — 87651 STREP A DNA AMP PROBE: CPT | Performed by: PHYSICIAN ASSISTANT

## 2024-01-03 PROCEDURE — 80053 COMPREHEN METABOLIC PANEL: CPT | Performed by: PHYSICIAN ASSISTANT

## 2024-01-03 RX ORDER — CLONIDINE HYDROCHLORIDE 0.1 MG/1
0.1 TABLET ORAL
Status: COMPLETED | OUTPATIENT
Start: 2024-01-03 | End: 2024-01-03

## 2024-01-03 RX ORDER — GUAIFENESIN/DEXTROMETHORPHAN 100-10MG/5
5 SYRUP ORAL 4 TIMES DAILY PRN
Qty: 120 ML | Refills: 0 | Status: SHIPPED | OUTPATIENT
Start: 2024-01-03 | End: 2024-01-13

## 2024-01-03 RX ORDER — MECLIZINE HYDROCHLORIDE 25 MG/1
25 TABLET ORAL
Status: COMPLETED | OUTPATIENT
Start: 2024-01-03 | End: 2024-01-03

## 2024-01-03 RX ORDER — IBUPROFEN 400 MG/1
800 TABLET ORAL
Status: COMPLETED | OUTPATIENT
Start: 2024-01-03 | End: 2024-01-03

## 2024-01-03 RX ORDER — MECLIZINE HYDROCHLORIDE 25 MG/1
25 TABLET ORAL 3 TIMES DAILY PRN
Qty: 20 TABLET | Refills: 0 | Status: SHIPPED | OUTPATIENT
Start: 2024-01-03

## 2024-01-03 RX ADMIN — IBUPROFEN 800 MG: 400 TABLET ORAL at 06:01

## 2024-01-03 RX ADMIN — CLONIDINE HYDROCHLORIDE 0.1 MG: 0.1 TABLET ORAL at 06:01

## 2024-01-03 RX ADMIN — MECLIZINE HYDROCHLORIDE 25 MG: 25 TABLET ORAL at 07:01

## 2024-01-04 NOTE — ED PROVIDER NOTES
Encounter Date: 1/3/2024       History     Chief Complaint   Patient presents with    Sore Throat     Pt complaint of sore throat, ha, dizziness at standing too fast and runny nose since this am     28 y.o. female with a history of morbid obesity and hypertension presents to the ED with sore throat, headache, cough, congestion, body aches onset this morning with worsening throughout the day.  Notes that she got dizzy upon standing earlier today however had not had anything to eat or drink before.  States she has been compliant with her blood pressure medication however feels as though it was running high because she was not feeling well.  Denies chest pain, shortness breath, blurred vision, fever, chills. no known sick contacts.      The history is provided by the patient. No  was used.     Review of patient's allergies indicates:  No Known Allergies  Past Medical History:   Diagnosis Date    Hypertension     Morbid obesity     SVT (supraventricular tachycardia)      No past surgical history on file.  Family History   Problem Relation Age of Onset    Diabetes Mother     Hypertension Mother     Diabetes Sister     Stroke Sister     Hypertension Sister      Social History     Tobacco Use    Smoking status: Never    Smokeless tobacco: Never   Substance Use Topics    Alcohol use: Not Currently     Comment: ocass    Drug use: Yes     Frequency: 2.0 times per week     Types: Marijuana     Review of Systems   Constitutional:  Negative for chills and fever.   HENT:  Positive for congestion and sore throat.    Eyes:  Negative for visual disturbance.   Respiratory:  Positive for cough. Negative for shortness of breath.    Cardiovascular:  Negative for chest pain.   Gastrointestinal:  Negative for abdominal pain, nausea and vomiting.   Genitourinary:  Negative for dysuria.   Musculoskeletal:  Positive for arthralgias.   Skin:  Negative for color change and rash.   Neurological:  Positive for dizziness and  headaches.   Psychiatric/Behavioral:  Negative for behavioral problems.    All other systems reviewed and are negative.      Physical Exam     Initial Vitals   BP Pulse Resp Temp SpO2   01/03/24 1708 01/03/24 1655 01/03/24 1655 01/03/24 1655 01/03/24 1655   (!) 149/84 100 18 99 °F (37.2 °C) 98 %      MAP       --                Physical Exam    Nursing note and vitals reviewed.  Constitutional: She appears well-developed and well-nourished.   HENT:   Head: Normocephalic and atraumatic.   Right Ear: External ear normal.   Left Ear: External ear normal.   Mouth/Throat: Oropharynx is clear and moist. No oropharyngeal exudate.   Eyes: EOM are normal. Pupils are equal, round, and reactive to light.   Neck: Neck supple.   Cardiovascular:  Normal rate, regular rhythm and normal heart sounds.           Pulmonary/Chest: Breath sounds normal. No respiratory distress. She has no wheezes. She has no rhonchi. She has no rales.   Abdominal: Abdomen is soft. Bowel sounds are normal.   Musculoskeletal:         General: Normal range of motion.      Cervical back: Neck supple.     Neurological: She is alert and oriented to person, place, and time. She has normal strength. GCS score is 15. GCS eye subscore is 4. GCS verbal subscore is 5. GCS motor subscore is 6.   Skin: Skin is warm and dry.   Psychiatric: She has a normal mood and affect.         ED Course   Procedures  Labs Reviewed   COMPREHENSIVE METABOLIC PANEL - Abnormal; Notable for the following components:       Result Value    Chloride 108 (*)     Albumin Level 3.4 (*)     Globulin 3.7 (*)     Albumin/Globulin Ratio 0.9 (*)     All other components within normal limits   CBC WITH DIFFERENTIAL - Abnormal; Notable for the following components:    MPV 10.9 (*)     All other components within normal limits   COVID/FLU A&B PCR - Normal    Narrative:     The Xpert Xpress SARS-CoV-2/FLU/RSV plus is a rapid, multiplexed real-time PCR test intended for the simultaneous qualitative  detection and differentiation of SARS-CoV-2, Influenza A, Influenza B, and respiratory syncytial virus (RSV) viral RNA in either nasopharyngeal swab or nasal swab specimens.         STREP GROUP A BY PCR - Normal    Narrative:     The Xpert Xpress Strep A test is a rapid, qualitative in vitro diagnostic test for the detection of Streptococcus pyogenes (Group A ß-hemolytic Streptococcus, Strep A) in throat swab specimens from patients with signs and symptoms of pharyngitis.     TROPONIN I - Normal   CBC W/ AUTO DIFFERENTIAL    Narrative:     The following orders were created for panel order CBC auto differential.  Procedure                               Abnormality         Status                     ---------                               -----------         ------                     CBC with Differential[[id number]]       Abnormal            Final result                 Please view results for these tests on the individual orders.     EKG Readings: (Independently Interpreted)   Initial Reading: No STEMI. Rhythm: Normal Sinus Rhythm. Heart Rate: 95. Ectopy: No Ectopy. Conduction: Normal. ST Segments: Normal ST Segments. T Waves: Normal. Axis: Normal.       Imaging Results    None          Medications   ibuprofen tablet 800 mg (800 mg Oral Given 1/3/24 1808)   cloNIDine tablet 0.1 mg (0.1 mg Oral Given 1/3/24 1808)   meclizine tablet 25 mg (25 mg Oral Given 1/3/24 1930)     Medical Decision Making  Differential diagnosis:  COVID, flu, strep pharyngitis, ACS, vertigo, hypertension    28 y.o. female with a history of morbid obesity and hypertension presents to the ED with sore throat, headache, cough, congestion, body aches onset this morning with worsening throughout the day.  Notes that she got dizzy upon standing earlier today however had not had anything to eat or drink before.  States she has been compliant with her blood pressure medication however feels as though it was running high because she was not feeling  "well.  Denies chest pain, shortness breath, blurred vision, fever, chills. no known sick contacts.        Amount and/or Complexity of Data Reviewed  Labs: ordered. Decision-making details documented in ED Course.  ECG/medicine tests: ordered.    Risk  OTC drugs.  Prescription drug management.               ED Course as of 01/03/24 1952 Wed Jan 03, 2024   1758 Influenza A, Molecular: Not Detected [MA]   1758 Influenza B, Molecular: Not Detected [MA]   1758 SARS-CoV2 (COVID-19) Qualitative PCR: Not Detected [MA]   1758 STREP A PCR ([facility]): Not Detected [MA]   1809 Orthostatic vital signs negative. [MA]   1809 BP(!): 174/125  Patient takes nighttime blood pressure medication.  Did offer to give her something here with she agreed to.  Will give 0.1 of clonidine as well as ibuprofen for her headache and re-evaluate [MA]   1846 Patient states she feels dizzy again after sitting up, needing assistance to get to the bathroom.  [MA]   1857 Patient back in her room from the bathroom, states dizziness has improved but still present.  Denies any chest pain or shortness breath.  Did offer to do labs/EKG which she is agreeable with to r/o cardiac etiology  [MA]   1946 Troponin I: <0.010 [MA]   1947 BP(!): 155/96  BP improving, re-evaluated patient and she states she was feeling little bit better however just fatigued.  Will discharge home with cough medication and meclizine to help with the any further dizziness. [MA]      ED Course User Index  [MA] Joseph Carpenter PA-C          BP (!) 155/96   Pulse 106   Temp 99 °F (37.2 °C) (Oral)   Resp 20   Ht 5' 4" (1.626 m)   Wt (!) 186 kg (410 lb)   LMP 12/27/2023 (Exact Date)   SpO2 99%   Breastfeeding No   BMI 70.38 kg/m²                    Clinical Impression:  Final diagnoses:  [R42] Dizziness  [B34.9] Acute viral syndrome (Primary)          ED Disposition Condition    Discharge Stable          ED Prescriptions       Medication Sig Dispense Start Date End Date Auth. " Provider    meclizine (ANTIVERT) 25 mg tablet Take 1 tablet (25 mg total) by mouth 3 (three) times daily as needed. 20 tablet 1/3/2024 -- Joseph Carpenter PA-C    dextromethorphan-guaiFENesin  mg/5 ml (ROBITUSSIN-DM)  mg/5 mL liquid Take 5 mLs by mouth 4 (four) times daily as needed (cough). 120 mL 1/3/2024 1/13/2024 Joseph Carpenter PA-C          Follow-up Information       Follow up With Specialties Details Why Contact Info    Cristela Benson, P Family Medicine   [address] [facility] [id number] [phone]      [facility] - Emergency Dept Emergency Medicine In 1 week If symptoms worsen [address]  [facility] [id number] [phone]             Joseph Carpenter PA-C  01/03/24 1952

## 2024-01-05 ENCOUNTER — TELEPHONE (OUTPATIENT)
Dept: FAMILY MEDICINE | Facility: CLINIC | Age: 29
End: 2024-01-05
Payer: MEDICAID

## 2024-01-05 ENCOUNTER — HOSPITAL ENCOUNTER (EMERGENCY)
Facility: HOSPITAL | Age: 29
Discharge: HOME | End: 2024-01-05
Attending: INTERNAL MEDICINE
Payer: MEDICAID

## 2024-01-05 VITALS
TEMPERATURE: 98 F | HEART RATE: 94 BPM | WEIGHT: 293 LBS | RESPIRATION RATE: 20 BRPM | OXYGEN SATURATION: 98 % | HEIGHT: 64 IN | DIASTOLIC BLOOD PRESSURE: 92 MMHG | SYSTOLIC BLOOD PRESSURE: 136 MMHG | BODY MASS INDEX: 50.02 KG/M2

## 2024-01-05 DIAGNOSIS — B96.89 BACTERIAL RESPIRATORY INFECTION: ICD-10-CM

## 2024-01-05 DIAGNOSIS — R51.9 SINUS HEADACHE: Primary | ICD-10-CM

## 2024-01-05 DIAGNOSIS — J06.9 VIRAL URI WITH COUGH: ICD-10-CM

## 2024-01-05 DIAGNOSIS — J98.8 BACTERIAL RESPIRATORY INFECTION: ICD-10-CM

## 2024-01-05 PROCEDURE — 99284 EMERGENCY DEPT VISIT MOD MDM: CPT

## 2024-01-05 PROCEDURE — 63600175 PHARM REV CODE 636 W HCPCS: Performed by: INTERNAL MEDICINE

## 2024-01-05 PROCEDURE — 96372 THER/PROPH/DIAG INJ SC/IM: CPT | Performed by: INTERNAL MEDICINE

## 2024-01-05 RX ORDER — AZITHROMYCIN 250 MG/1
TABLET, FILM COATED ORAL
Qty: 6 TABLET | Refills: 0 | Status: SHIPPED | OUTPATIENT
Start: 2024-01-05

## 2024-01-05 RX ORDER — ALBUTEROL SULFATE 90 UG/1
2 AEROSOL, METERED RESPIRATORY (INHALATION) EVERY 6 HOURS PRN
Qty: 18 G | Refills: 0 | Status: SHIPPED | OUTPATIENT
Start: 2024-01-05

## 2024-01-05 RX ORDER — FLUTICASONE PROPIONATE 50 MCG
1 SPRAY, SUSPENSION (ML) NASAL 2 TIMES DAILY PRN
Qty: 16 G | Refills: 0 | Status: SHIPPED | OUTPATIENT
Start: 2024-01-05

## 2024-01-05 RX ORDER — PREDNISONE 20 MG/1
20 TABLET ORAL DAILY
Qty: 5 TABLET | Refills: 0 | Status: SHIPPED | OUTPATIENT
Start: 2024-01-05

## 2024-01-05 RX ORDER — DEXAMETHASONE SODIUM PHOSPHATE 4 MG/ML
8 INJECTION, SOLUTION INTRA-ARTICULAR; INTRALESIONAL; INTRAMUSCULAR; INTRAVENOUS; SOFT TISSUE
Status: COMPLETED | OUTPATIENT
Start: 2024-01-05 | End: 2024-01-05

## 2024-01-05 RX ADMIN — DEXAMETHASONE SODIUM PHOSPHATE 8 MG: 4 INJECTION, SOLUTION INTRA-ARTICULAR; INTRALESIONAL; INTRAMUSCULAR; INTRAVENOUS; SOFT TISSUE at 01:01

## 2024-01-05 NOTE — ED PROVIDER NOTES
Source of History:  Patient, no limitations    Chief complaint:  Headache (Pt c/o continued h/a, sore throat, runny nose and high blood pressure. States was seen here two days ago for the same and tested negative for covid/flu and strep.)      HPI:  Esthela Maddox is a 28 y.o. female presenting with Headache (Pt c/o continued h/a, sore throat, runny nose and high blood pressure. States was seen here two days ago for the same and tested negative for covid/flu and strep.)         Patient presents for evaluation of headache, location:frontal region, congestion, sore throat, cough. Onset of symptoms was abrupt starting a few days ago, and has been unchanged since that time. Symptoms include congestion. The symptoms are worse with cold symptoms and coughing.  The patient has a past medical history of obesity. Care prior to arrival consisted of  no relief with meclizine and cough syrup,        Review of Systems   Constitutional symptoms:  Negative except as documented in HPI.   Skin symptoms:  Negative except as documented in HPI.   HEENT symptoms:  Negative except as documented in HPI.   Respiratory symptoms:  Negative except as documented in HPI.   Cardiovascular symptoms:  Negative except as documented in HPI.   Gastrointestinal symptoms:  Negative except as documented in HPI.    Genitourinary symptoms:  Negative except as documented in HPI.   Musculoskeletal symptoms:  Negative except as documented in HPI.   Neurologic symptoms:  Negative except as documented in HPI.   Psychiatric symptoms:  Negative except as documented in HPI.   Allergy/immunologic symptoms:  Negative except as documented in HPI.             Additional review of systems information: All other systems reviewed and otherwise negative.      Review of patient's allergies indicates:  No Known Allergies    PMH:  As per HPI and below:    Past Medical History:   Diagnosis Date    Hypertension     Morbid obesity     SVT (supraventricular  "tachycardia)         Family History   Problem Relation Age of Onset    Diabetes Mother     Hypertension Mother     Diabetes Sister     Stroke Sister     Hypertension Sister        No past surgical history on file.    Social History     Tobacco Use    Smoking status: Never    Smokeless tobacco: Never   Substance Use Topics    Alcohol use: Not Currently     Comment: ocass    Drug use: Yes     Frequency: 2.0 times per week     Types: Marijuana       Patient Active Problem List   Diagnosis    Elevated blood-pressure reading, without diagnosis of hypertension    Hemorrhoids, external    Morbid obesity    Syphilis    SVT (supraventricular tachycardia)    Chest pain    Encounter to establish care    Hypertension    Depression    Vitamin D deficiency    Anxiety        Physical Exam:    BP (!) 136/92 (BP Location: Left arm)   Pulse 94   Temp 97.9 °F (36.6 °C) (Temporal)   Resp 20   Ht 5' 4" (1.626 m)   Wt (!) 186 kg (410 lb)   LMP 12/27/2023 (Exact Date)   SpO2 98%   BMI 70.38 kg/m²     Nursing note and vital signs reviewed.    General:  Alert, obese  Skin: Normal for Ethnic Origin, No cyanosis  HEENT: Normocephalic and atraumatic, Vision unchanged, Pupils symmetric, No icterus , Nasal mucosa is pink and moist, moderate sinus congestion with thick purulent discharge  Cardiovascular:  Regular rate and rhythm  Chest Wall: No deformity, equal chest rise  Respiratory:  Lungs are clear to auscultation, respirations are non-labored.    Musculoskeletal:  No deformity, Normal perfusion to all extremities  Gastrointestinal:  Soft, Non distended  Neurological:  Alert and oriented, normal motor observed, normal speech observed.    Psychiatric:  Cooperative, appropriate mood & affect.        Labs that have been ordered have been independently reviewed and interpreted by myself.     Old Chart Reviewed.      Initial Impression/ Differential Dx:  Viral upper respiratory infection, sinusitis, allergic rhinitis, bronchitis, " influenza, pneumonia, dental infection, pharyngitis       MDM:      Reviewed Nurses Note.    Reviewed Pertinent old records.    Orders Placed This Encounter    dexAMETHasone injection 8 mg    azithromycin (Z-ANA) 250 MG tablet    fluticasone propionate (FLONASE) 50 mcg/actuation nasal spray    albuterol (VENTOLIN HFA) 90 mcg/actuation inhaler    predniSONE (DELTASONE) 20 MG tablet                    Labs Reviewed - No data to display       No orders to display        No visits with results within 1 Day(s) from this visit.   Latest known visit with results is:   Admission on 01/03/2024, Discharged on 01/03/2024   Component Date Value Ref Range Status    Influenza A PCR 01/03/2024 Not Detected  Not Detected Final    Influenza B PCR 01/03/2024 Not Detected  Not Detected Final    SARS-CoV-2 PCR 01/03/2024 Not Detected  Not Detected, Negative Final    STREP A PCR ([facility]) 01/03/2024 Not Detected  Not Detected Final    Sodium Level 01/03/2024 140  136 - 145 mmol/L Final    Potassium Level 01/03/2024 4.1  3.5 - 5.1 mmol/L Final    Chloride 01/03/2024 108 (H)  98 - 107 mmol/L Final    Carbon Dioxide 01/03/2024 23  22 - 29 mmol/L Final    Glucose Level 01/03/2024 97  74 - 100 mg/dL Final    Blood Urea Nitrogen 01/03/2024 10.8  7.0 - 18.7 mg/dL Final    Creatinine 01/03/2024 0.82  0.55 - 1.02 mg/dL Final    Calcium Level Total 01/03/2024 9.3  8.4 - 10.2 mg/dL Final    Protein Total 01/03/2024 7.1  6.4 - 8.3 gm/dL Final    Albumin Level 01/03/2024 3.4 (L)  3.5 - 5.0 g/dL Final    Globulin 01/03/2024 3.7 (H)  2.4 - 3.5 gm/dL Final    Albumin/Globulin Ratio 01/03/2024 0.9 (L)  1.1 - 2.0 ratio Final    Bilirubin Total 01/03/2024 0.3  <=1.5 mg/dL Final    Alkaline Phosphatase 01/03/2024 80  40 - 150 unit/L Final    Alanine Aminotransferase 01/03/2024 39  0 - 55 unit/L Final    Aspartate Aminotransferase 01/03/2024 24  5 - 34 unit/L Final    eGFR 01/03/2024 >60  mls/min/1.73/m2 Final    Troponin-I 01/03/2024 <0.010  0.000 - 0.045  ng/mL Final    WBC 01/03/2024 8.17  4.50 - 11.50 x10(3)/mcL Final    RBC 01/03/2024 4.57  4.20 - 5.40 x10(6)/mcL Final    Hgb 01/03/2024 13.2  12.0 - 16.0 g/dL Final    Hct 01/03/2024 40.0  37.0 - 47.0 % Final    MCV 01/03/2024 87.5  80.0 - 94.0 fL Final    MCH 01/03/2024 28.9  27.0 - 31.0 pg Final    MCHC 01/03/2024 33.0  33.0 - 36.0 g/dL Final    RDW 01/03/2024 13.1  11.5 - 17.0 % Final    Platelet 01/03/2024 259  130 - 400 x10(3)/mcL Final    MPV 01/03/2024 10.9 (H)  7.4 - 10.4 fL Final    Neut % 01/03/2024 67.3  % Final    Lymph % 01/03/2024 22.6  % Final    Mono % 01/03/2024 7.7  % Final    Eos % 01/03/2024 1.7  % Final    Basophil % 01/03/2024 0.5  % Final    Lymph # 01/03/2024 1.85  0.6 - 4.6 x10(3)/mcL Final    Neut # 01/03/2024 5.49  2.1 - 9.2 x10(3)/mcL Final    Mono # 01/03/2024 0.63  0.1 - 1.3 x10(3)/mcL Final    Eos # 01/03/2024 0.14  0 - 0.9 x10(3)/mcL Final    Baso # 01/03/2024 0.04  <=0.2 x10(3)/mcL Final    IG# 01/03/2024 0.02  0 - 0.04 x10(3)/mcL Final    IG% 01/03/2024 0.2  % Final    NRBC% 01/03/2024 0.0  % Final       Imaging Results    None                                              Diagnostic Impression:    1. Sinus headache    2. Viral URI with cough    3. Bacterial respiratory infection         ED Disposition Condition    Discharge Stable             Follow-up Information       [facility] - Emergency Dept.    Specialty: Emergency Medicine  Why: If symptoms worsen  Contact information:  [address]  [facility] [address] [phone]                            ED Prescriptions       Medication Sig Dispense Start Date End Date Auth. Provider    azithromycin (Z-ANA) 250 MG tablet Take 2 tablets by mouth on day 1; Take 1 tablet by mouth on days 2-5 6 tablet 1/5/2024 -- En Gonzales,     fluticasone propionate (FLONASE) 50 mcg/actuation nasal spray 1 spray (50 mcg total) by Each Nostril route 2 (two) times daily as needed for Rhinitis. 16  g 1/5/2024 -- En Gonzales DO    albuterol (VENTOLIN HFA) 90 mcg/actuation inhaler Inhale 2 puffs into the lungs every 6 (six) hours as needed for Wheezing. Rescue 18 g 1/5/2024 -- En Gonzales DO    predniSONE (DELTASONE) 20 MG tablet Take 1 tablet (20 mg total) by mouth once daily. 5 tablet 1/5/2024 -- En Gonzales DO          Follow-up Information       Follow up With Specialties Details Why Contact Info    [facility] - Emergency Dept Emergency Medicine  If symptoms worsen [address]  [facility] [phone]             En Gonzales DO  01/05/24 2839

## 2024-01-05 NOTE — ED TRIAGE NOTES
Pt c/o continued h/a, sore throat, runny nose and high blood pressure. States was seen here two days ago for the same and tested negative for covid/flu and strep.

## 2024-01-18 ENCOUNTER — CLINICAL SUPPORT (OUTPATIENT)
Dept: GYNECOLOGY | Facility: CLINIC | Age: 29
End: 2024-01-18
Payer: MEDICAID

## 2024-01-18 DIAGNOSIS — Z23 NEED FOR HPV VACCINE: Primary | ICD-10-CM

## 2024-01-18 PROCEDURE — 90471 IMMUNIZATION ADMIN: CPT | Mod: PBBFAC

## 2024-01-18 PROCEDURE — 90651 9VHPV VACCINE 2/3 DOSE IM: CPT | Mod: PBBFAC

## 2024-01-18 RX ADMIN — HUMAN PAPILLOMAVIRUS 9-VALENT VACCINE, RECOMBINANT 0.5 ML: 30; 40; 60; 40; 20; 20; 20; 20; 20 INJECTION, SUSPENSION INTRAMUSCULAR at 02:01

## 2024-01-24 DIAGNOSIS — Z00.00 WELLNESS EXAMINATION: Primary | ICD-10-CM

## 2024-03-27 DIAGNOSIS — I10 HYPERTENSION, UNSPECIFIED TYPE: ICD-10-CM

## 2024-03-27 RX ORDER — METOPROLOL TARTRATE 25 MG/1
25 TABLET, FILM COATED ORAL 2 TIMES DAILY
Qty: 60 TABLET | Refills: 11 | Status: SHIPPED | OUTPATIENT
Start: 2024-03-27 | End: 2025-03-27

## 2024-03-27 RX ORDER — ASPIRIN 81 MG/1
81 TABLET ORAL DAILY
Qty: 30 TABLET | Refills: 11 | Status: SHIPPED | OUTPATIENT
Start: 2024-03-27

## 2024-07-01 ENCOUNTER — LAB VISIT (OUTPATIENT)
Dept: LAB | Facility: HOSPITAL | Age: 29
End: 2024-07-01
Attending: NURSE PRACTITIONER
Payer: MEDICAID

## 2024-07-01 ENCOUNTER — TELEPHONE (OUTPATIENT)
Dept: GYNECOLOGY | Facility: CLINIC | Age: 29
End: 2024-07-01
Payer: MEDICAID

## 2024-07-01 DIAGNOSIS — N92.6 IRREGULAR PERIODS/MENSTRUAL CYCLES: ICD-10-CM

## 2024-07-01 DIAGNOSIS — Z11.3 ROUTINE SCREENING FOR STI (SEXUALLY TRANSMITTED INFECTION): ICD-10-CM

## 2024-07-01 LAB
FSH SERPL-ACNC: 3.22 MIU/ML
HBV SURFACE AG SERPL QL IA: NONREACTIVE
HCV AB SERPL QL IA: NONREACTIVE
HIV 1+2 AB+HIV1 P24 AG SERPL QL IA: NONREACTIVE
PROLACTIN LEVEL (OLG): 16.23 NG/ML (ref 5.18–26.53)
RPR SER QL: REACTIVE
RPR SER-TITR: ABNORMAL {TITER}
T PALLIDUM AB SER QL: REACTIVE
TESTOST SERPL-MCNC: 35.21 NG/DL (ref 13.84–53.35)

## 2024-07-01 PROCEDURE — 86803 HEPATITIS C AB TEST: CPT

## 2024-07-01 PROCEDURE — 87340 HEPATITIS B SURFACE AG IA: CPT

## 2024-07-01 PROCEDURE — 36415 COLL VENOUS BLD VENIPUNCTURE: CPT

## 2024-07-01 PROCEDURE — 83001 ASSAY OF GONADOTROPIN (FSH): CPT

## 2024-07-01 PROCEDURE — 86592 SYPHILIS TEST NON-TREP QUAL: CPT

## 2024-07-01 PROCEDURE — 84146 ASSAY OF PROLACTIN: CPT

## 2024-07-01 PROCEDURE — 87389 HIV-1 AG W/HIV-1&-2 AB AG IA: CPT

## 2024-07-01 PROCEDURE — 86780 TREPONEMA PALLIDUM: CPT

## 2024-07-01 PROCEDURE — 84403 ASSAY OF TOTAL TESTOSTERONE: CPT

## 2024-07-01 NOTE — TELEPHONE ENCOUNTER
Spoke to patient to inform of results and medication sent to the pharmacy on file. Educated patient on possible hygiene changes. Patient stated she will take the medication after her party. Verbalized understanding and stated she will change her soap.

## 2024-07-08 ENCOUNTER — TELEPHONE (OUTPATIENT)
Dept: GYNECOLOGY | Facility: CLINIC | Age: 29
End: 2024-07-08
Payer: MEDICAID

## 2024-07-08 NOTE — TELEPHONE ENCOUNTER
Called pt with results of pap smear. NIL and HPV OHR positive in 2023 and 2024. LSIL and HPV OHR positive previous with n/s colpo.      Please schedule pt in colpo clinic prior to IUD scheduled with GYN in 10/2024.

## 2024-07-18 ENCOUNTER — HOSPITAL ENCOUNTER (OUTPATIENT)
Dept: RADIOLOGY | Facility: HOSPITAL | Age: 29
Discharge: HOME | End: 2024-07-18
Attending: NURSE PRACTITIONER
Payer: MEDICAID

## 2024-07-18 DIAGNOSIS — N93.9 ABNORMAL UTERINE BLEEDING (AUB): ICD-10-CM

## 2024-07-18 DIAGNOSIS — N92.6 IRREGULAR PERIODS/MENSTRUAL CYCLES: ICD-10-CM

## 2024-07-18 PROCEDURE — 76830 TRANSVAGINAL US NON-OB: CPT | Mod: TC

## 2024-07-23 ENCOUNTER — HOSPITAL ENCOUNTER (EMERGENCY)
Facility: HOSPITAL | Age: 29
Discharge: HOME | End: 2024-07-23
Attending: INTERNAL MEDICINE
Payer: MEDICAID

## 2024-07-23 VITALS
RESPIRATION RATE: 18 BRPM | TEMPERATURE: 98 F | HEART RATE: 67 BPM | BODY MASS INDEX: 50.02 KG/M2 | SYSTOLIC BLOOD PRESSURE: 123 MMHG | WEIGHT: 293 LBS | OXYGEN SATURATION: 98 % | DIASTOLIC BLOOD PRESSURE: 74 MMHG | HEIGHT: 64 IN

## 2024-07-23 DIAGNOSIS — K29.60 GASTRITIS, EROSIVE: Primary | ICD-10-CM

## 2024-07-23 LAB
ALBUMIN SERPL-MCNC: 3.1 G/DL (ref 3.5–5)
ALBUMIN/GLOB SERPL: 0.9 RATIO (ref 1.1–2)
ALP SERPL-CCNC: 78 UNIT/L (ref 40–150)
ALT SERPL-CCNC: 46 UNIT/L (ref 0–55)
ANION GAP SERPL CALC-SCNC: 8 MEQ/L
AST SERPL-CCNC: 41 UNIT/L (ref 5–34)
B-HCG UR QL: NEGATIVE
BILIRUB SERPL-MCNC: 0.2 MG/DL
BUN SERPL-MCNC: 11.8 MG/DL (ref 7–18.7)
CALCIUM SERPL-MCNC: 9 MG/DL (ref 8.4–10.2)
CHLORIDE SERPL-SCNC: 108 MMOL/L (ref 98–107)
CO2 SERPL-SCNC: 24 MMOL/L (ref 22–29)
CREAT SERPL-MCNC: 0.75 MG/DL (ref 0.55–1.02)
CREAT/UREA NIT SERPL: 16
CTP QC/QA: YES
GFR SERPLBLD CREATININE-BSD FMLA CKD-EPI: >60 ML/MIN/1.73/M2
GLOBULIN SER-MCNC: 3.3 GM/DL (ref 2.4–3.5)
GLUCOSE SERPL-MCNC: 89 MG/DL (ref 74–100)
LIPASE SERPL-CCNC: 15 U/L
POTASSIUM SERPL-SCNC: 3.5 MMOL/L (ref 3.5–5.1)
PROT SERPL-MCNC: 6.4 GM/DL (ref 6.4–8.3)
SODIUM SERPL-SCNC: 140 MMOL/L (ref 136–145)

## 2024-07-23 PROCEDURE — 63600175 PHARM REV CODE 636 W HCPCS: Performed by: INTERNAL MEDICINE

## 2024-07-23 PROCEDURE — 83690 ASSAY OF LIPASE: CPT | Performed by: INTERNAL MEDICINE

## 2024-07-23 PROCEDURE — 81025 URINE PREGNANCY TEST: CPT | Performed by: INTERNAL MEDICINE

## 2024-07-23 PROCEDURE — 25000003 PHARM REV CODE 250: Performed by: INTERNAL MEDICINE

## 2024-07-23 PROCEDURE — 80053 COMPREHEN METABOLIC PANEL: CPT | Performed by: INTERNAL MEDICINE

## 2024-07-23 RX ORDER — SUCRALFATE 1 G/10ML
1 SUSPENSION ORAL 4 TIMES DAILY
Qty: 280 ML | Refills: 0 | Status: SHIPPED | OUTPATIENT
Start: 2024-07-23 | End: 2024-07-30

## 2024-07-23 RX ORDER — SUCRALFATE 1 G/10ML
1 SUSPENSION ORAL
Status: COMPLETED | OUTPATIENT
Start: 2024-07-23 | End: 2024-07-23

## 2024-07-23 RX ORDER — ONDANSETRON 4 MG/1
4 TABLET, ORALLY DISINTEGRATING ORAL EVERY 6 HOURS PRN
Qty: 15 TABLET | Refills: 0 | Status: SHIPPED | OUTPATIENT
Start: 2024-07-23

## 2024-07-23 RX ORDER — ONDANSETRON HYDROCHLORIDE 2 MG/ML
4 INJECTION, SOLUTION INTRAVENOUS ONCE
Status: COMPLETED | OUTPATIENT
Start: 2024-07-23 | End: 2024-07-23

## 2024-07-23 RX ORDER — FAMOTIDINE 10 MG/ML
20 INJECTION INTRAVENOUS
Status: COMPLETED | OUTPATIENT
Start: 2024-07-23 | End: 2024-07-23

## 2024-07-23 RX ORDER — OMEPRAZOLE 40 MG/1
40 CAPSULE, DELAYED RELEASE ORAL
Qty: 28 CAPSULE | Refills: 0 | Status: SHIPPED | OUTPATIENT
Start: 2024-07-23 | End: 2024-08-06

## 2024-07-23 RX ADMIN — FAMOTIDINE 20 MG: 10 INJECTION, SOLUTION INTRAVENOUS at 09:07

## 2024-07-23 RX ADMIN — ONDANSETRON 4 MG: 2 INJECTION INTRAMUSCULAR; INTRAVENOUS at 09:07

## 2024-07-23 RX ADMIN — SUCRALFATE 1 G: 1 SUSPENSION ORAL at 10:07

## 2024-07-23 RX ADMIN — SODIUM CHLORIDE, POTASSIUM CHLORIDE, SODIUM LACTATE AND CALCIUM CHLORIDE 1000 ML: 600; 310; 30; 20 INJECTION, SOLUTION INTRAVENOUS at 09:07

## 2024-07-23 NOTE — Clinical Note
"Esthela Bellajune Maddox was seen and treated in our emergency department on 7/23/2024.  She may return to work on 07/25/2024.       If you have any questions or concerns, please don't hesitate to call.      GENOVEVA Curry RN RN    "

## 2024-07-24 LAB
HOLD SPECIMEN: NORMAL
HOLD SPECIMEN: NORMAL

## 2024-07-24 NOTE — ED PROVIDER NOTES
"Encounter Date: 7/23/2024       History     Chief Complaint   Patient presents with    Vomiting     States got "extremely drunk, drunk, drunk" Saturday night.  Has not been able to hold food down since.  States vomits when eats solid food.       Presents with nausea and vomiting after drinking alcohol over the weekend. Hx of HTN    The history is provided by the patient.     Review of patient's allergies indicates:  No Known Allergies  Past Medical History:   Diagnosis Date    Hypertension     Morbid obesity     SVT (supraventricular tachycardia)      History reviewed. No pertinent surgical history.  Family History   Problem Relation Name Age of Onset    Diabetes Mother      Hypertension Mother      Diabetes Sister      Stroke Sister      Hypertension Sister       Social History     Tobacco Use    Smoking status: Never    Smokeless tobacco: Never   Substance Use Topics    Alcohol use: Yes     Comment: ocass    Drug use: Yes     Frequency: 2.0 times per week     Types: Marijuana     Review of Systems   Gastrointestinal:  Positive for nausea and vomiting.       Physical Exam     Initial Vitals [07/23/24 1953]   BP Pulse Resp Temp SpO2   131/84 88 18 98.4 °F (36.9 °C) 100 %      MAP       --         Physical Exam    Nursing note and vitals reviewed.  Constitutional: She appears well-developed and well-nourished. No distress.   HENT:   Head: Normocephalic and atraumatic.   Mouth/Throat: Oropharynx is clear and moist.   Eyes: Conjunctivae are normal. Pupils are equal, round, and reactive to light.   Neck: Neck supple. No JVD present.   Normal range of motion.  Cardiovascular:  Normal rate, regular rhythm, normal heart sounds and intact distal pulses.           Pulmonary/Chest: Breath sounds normal.   Abdominal: Abdomen is soft. Bowel sounds are normal. She exhibits no distension. There is no abdominal tenderness. There is no rebound and no guarding.   Musculoskeletal:         General: No edema. Normal range of motion.    "   Cervical back: Normal range of motion and neck supple.     Neurological: She is alert and oriented to person, place, and time. She has normal strength.   Skin: Skin is warm and dry. No rash noted.   Psychiatric: Her behavior is normal.         ED Course   Procedures  Labs Reviewed   COMPREHENSIVE METABOLIC PANEL - Abnormal       Result Value    Sodium 140      Potassium 3.5      Chloride 108 (*)     CO2 24      Glucose 89      Blood Urea Nitrogen 11.8      Creatinine 0.75      Calcium 9.0      Protein Total 6.4      Albumin 3.1 (*)     Globulin 3.3      Albumin/Globulin Ratio 0.9 (*)     Bilirubin Total 0.2      ALP 78      ALT 46      AST 41 (*)     eGFR >60      Anion Gap 8.0      BUN/Creatinine Ratio 16     LIPASE - Normal    Lipase Level 15     EXTRA TUBES    Narrative:     The following orders were created for panel order EXTRA TUBES.  Procedure                               Abnormality         Status                     ---------                               -----------         ------                     Light Blue Top Hold[[id number]]                             In process                 Red Top Hold[[id number]]                                                               Lavender Top Hold[[id number]]                               In process                   Please view results for these tests on the individual orders.   LIGHT BLUE TOP HOLD   RED TOP HOLD   LAVENDER TOP HOLD   POCT URINE PREGNANCY    POC Preg Test, Ur Negative       Acceptable Yes            Imaging Results    None          Medications   famotidine (PF) injection 20 mg (20 mg Intravenous Given 7/23/24 2124)   ondansetron injection 4 mg (4 mg Intravenous Given 7/23/24 2124)   lactated ringers bolus 1,000 mL (1,000 mLs Intravenous New Bag 7/23/24 2124)   sucralfate 100 mg/mL suspension 1 g (1 g Oral Given 7/23/24 2204)     Medical Decision Making  Amount and/or Complexity of Data Reviewed  Labs: ordered. Decision-making  details documented in ED Course.    Risk  Prescription drug management.      Additional MDM:   Differential Diagnosis:   Appendicitis, Diverticulitis, Pancreatitis, Pyelonephritis, AAA, Dissection, MI, Gastric Ulcer, Peptic Ulcer, Urinary retention, among others                                        Clinical Impression:  Final diagnoses:  [K29.60] Gastritis, erosive (Primary)          ED Disposition Condition    Discharge Stable          ED Prescriptions       Medication Sig Dispense Start Date End Date Auth. Provider    sucralfate (CARAFATE) 100 mg/mL suspension Take 10 mLs (1 g total) by mouth 4 (four) times daily. for 7 days 280 mL 7/23/2024 7/30/2024 Cam Greene MD    omeprazole (PRILOSEC) 40 MG capsule Take 1 capsule (40 mg total) by mouth 2 (two) times daily before meals. for 14 days 28 capsule 7/23/2024 8/6/2024 Cam Greene MD    ondansetron (ZOFRAN-ODT) 4 MG TbDL Take 1 tablet (4 mg total) by mouth every 6 (six) hours as needed. 15 tablet 7/23/2024 -- Cam Greene MD          Follow-up Information       Follow up With Specialties Details Why Contact Info    Cristela Benson, P Family Medicine Schedule an appointment as soon as possible for a visit in 2 weeks  [address] [facility] [id number] [phone]      [facility] - Emergency Dept Emergency Medicine  If symptoms worsen [address] [facility] [address] [phone]             Cam Greene MD  07/23/24 4891

## 2024-08-27 ENCOUNTER — HOSPITAL ENCOUNTER (EMERGENCY)
Facility: HOSPITAL | Age: 29
Discharge: HOME | End: 2024-08-27
Attending: STUDENT IN AN ORGANIZED HEALTH CARE EDUCATION/TRAINING PROGRAM
Payer: MEDICAID

## 2024-08-27 VITALS
SYSTOLIC BLOOD PRESSURE: 128 MMHG | HEIGHT: 64 IN | BODY MASS INDEX: 50.02 KG/M2 | WEIGHT: 293 LBS | HEART RATE: 78 BPM | OXYGEN SATURATION: 99 % | TEMPERATURE: 98 F | RESPIRATION RATE: 14 BRPM | DIASTOLIC BLOOD PRESSURE: 86 MMHG

## 2024-08-27 DIAGNOSIS — R07.9 CHEST PAIN: ICD-10-CM

## 2024-08-27 DIAGNOSIS — U07.1 COVID-19: Primary | ICD-10-CM

## 2024-08-27 LAB
ALBUMIN SERPL-MCNC: 3.3 G/DL (ref 3.5–5)
ALBUMIN/GLOB SERPL: 1 RATIO (ref 1.1–2)
ALP SERPL-CCNC: 74 UNIT/L (ref 40–150)
ALT SERPL-CCNC: 36 UNIT/L (ref 0–55)
ANION GAP SERPL CALC-SCNC: 5 MEQ/L
AST SERPL-CCNC: 20 UNIT/L (ref 5–34)
B-HCG UR QL: NEGATIVE
BASOPHILS # BLD AUTO: 0.03 X10(3)/MCL
BASOPHILS NFR BLD AUTO: 0.5 %
BILIRUB SERPL-MCNC: 0.3 MG/DL
BNP BLD-MCNC: <10 PG/ML
BUN SERPL-MCNC: 15.2 MG/DL (ref 7–18.7)
CALCIUM SERPL-MCNC: 9.2 MG/DL (ref 8.4–10.2)
CHLORIDE SERPL-SCNC: 108 MMOL/L (ref 98–107)
CO2 SERPL-SCNC: 25 MMOL/L (ref 22–29)
CREAT SERPL-MCNC: 0.78 MG/DL (ref 0.55–1.02)
CREAT/UREA NIT SERPL: 19
CTP QC/QA: YES
EOSINOPHIL # BLD AUTO: 0.13 X10(3)/MCL (ref 0–0.9)
EOSINOPHIL NFR BLD AUTO: 2.1 %
ERYTHROCYTE [DISTWIDTH] IN BLOOD BY AUTOMATED COUNT: 13 % (ref 11.5–17)
FLUAV AG UPPER RESP QL IA.RAPID: NOT DETECTED
FLUBV AG UPPER RESP QL IA.RAPID: NOT DETECTED
GFR SERPLBLD CREATININE-BSD FMLA CKD-EPI: >60 ML/MIN/1.73/M2
GLOBULIN SER-MCNC: 3.4 GM/DL (ref 2.4–3.5)
GLUCOSE SERPL-MCNC: 97 MG/DL (ref 74–100)
HCT VFR BLD AUTO: 41.7 % (ref 37–47)
HGB BLD-MCNC: 13.6 G/DL (ref 12–16)
HOLD SPECIMEN: NORMAL
IMM GRANULOCYTES # BLD AUTO: 0.01 X10(3)/MCL (ref 0–0.04)
IMM GRANULOCYTES NFR BLD AUTO: 0.2 %
INR PPP: 1
LYMPHOCYTES # BLD AUTO: 2.35 X10(3)/MCL (ref 0.6–4.6)
LYMPHOCYTES NFR BLD AUTO: 38.5 %
MAGNESIUM SERPL-MCNC: 2.2 MG/DL (ref 1.6–2.6)
MCH RBC QN AUTO: 28.9 PG (ref 27–31)
MCHC RBC AUTO-ENTMCNC: 32.6 G/DL (ref 33–36)
MCV RBC AUTO: 88.7 FL (ref 80–94)
MONOCYTES # BLD AUTO: 0.48 X10(3)/MCL (ref 0.1–1.3)
MONOCYTES NFR BLD AUTO: 7.9 %
NEUTROPHILS # BLD AUTO: 3.11 X10(3)/MCL (ref 2.1–9.2)
NEUTROPHILS NFR BLD AUTO: 50.8 %
NRBC BLD AUTO-RTO: 0 %
OHS QRS DURATION: 74 MS
OHS QTC CALCULATION: 424 MS
PLATELET # BLD AUTO: 274 X10(3)/MCL (ref 130–400)
PMV BLD AUTO: 11.8 FL (ref 7.4–10.4)
POTASSIUM SERPL-SCNC: 4.2 MMOL/L (ref 3.5–5.1)
PROT SERPL-MCNC: 6.7 GM/DL (ref 6.4–8.3)
PROTHROMBIN TIME: 12.9 SECONDS (ref 11.4–14)
RBC # BLD AUTO: 4.7 X10(6)/MCL (ref 4.2–5.4)
SARS-COV-2 RNA RESP QL NAA+PROBE: DETECTED
SODIUM SERPL-SCNC: 138 MMOL/L (ref 136–145)
TROPONIN I SERPL-MCNC: <0.01 NG/ML (ref 0–0.04)
WBC # BLD AUTO: 6.11 X10(3)/MCL (ref 4.5–11.5)

## 2024-08-27 PROCEDURE — 80053 COMPREHEN METABOLIC PANEL: CPT | Performed by: STUDENT IN AN ORGANIZED HEALTH CARE EDUCATION/TRAINING PROGRAM

## 2024-08-27 PROCEDURE — 99285 EMERGENCY DEPT VISIT HI MDM: CPT | Mod: 25

## 2024-08-27 PROCEDURE — 85610 PROTHROMBIN TIME: CPT | Performed by: STUDENT IN AN ORGANIZED HEALTH CARE EDUCATION/TRAINING PROGRAM

## 2024-08-27 PROCEDURE — 93005 ELECTROCARDIOGRAM TRACING: CPT

## 2024-08-27 PROCEDURE — 83735 ASSAY OF MAGNESIUM: CPT | Performed by: STUDENT IN AN ORGANIZED HEALTH CARE EDUCATION/TRAINING PROGRAM

## 2024-08-27 PROCEDURE — 0240U COVID/FLU A&B PCR: CPT | Performed by: STUDENT IN AN ORGANIZED HEALTH CARE EDUCATION/TRAINING PROGRAM

## 2024-08-27 PROCEDURE — 85025 COMPLETE CBC W/AUTO DIFF WBC: CPT | Performed by: STUDENT IN AN ORGANIZED HEALTH CARE EDUCATION/TRAINING PROGRAM

## 2024-08-27 PROCEDURE — 84484 ASSAY OF TROPONIN QUANT: CPT | Performed by: STUDENT IN AN ORGANIZED HEALTH CARE EDUCATION/TRAINING PROGRAM

## 2024-08-27 PROCEDURE — 83880 ASSAY OF NATRIURETIC PEPTIDE: CPT | Performed by: STUDENT IN AN ORGANIZED HEALTH CARE EDUCATION/TRAINING PROGRAM

## 2024-08-27 PROCEDURE — 81025 URINE PREGNANCY TEST: CPT | Performed by: STUDENT IN AN ORGANIZED HEALTH CARE EDUCATION/TRAINING PROGRAM

## 2024-08-27 NOTE — ED PROVIDER NOTES
Encounter Date: 8/27/2024       History     Chief Complaint   Patient presents with    Shortness of Breath     Pt in with complaints of shortness of breath and chest pain that started this morning.     Patient presents to the emergency department complaining of shortness of breath and chest pain.  She states this morning she noticed a sharp heaviness in the center of her chest and pain with breathing.  Symptoms have mostly improved.  Denies any cough congestion or fevers.  No sore throat, she does have a sinus nasal congestion.  No known cardiac or lung history.    The history is provided by the patient.     Review of patient's allergies indicates:  No Known Allergies  Past Medical History:   Diagnosis Date    Hypertension     Morbid obesity     SVT (supraventricular tachycardia)      History reviewed. No pertinent surgical history.  Family History   Problem Relation Name Age of Onset    Diabetes Mother      Hypertension Mother      Diabetes Sister      Stroke Sister      Hypertension Sister       Social History     Tobacco Use    Smoking status: Never    Smokeless tobacco: Never   Substance Use Topics    Alcohol use: Yes     Comment: ocass    Drug use: Yes     Frequency: 2.0 times per week     Types: Marijuana     Review of Systems   Constitutional:  Negative for chills and fever.   HENT:  Negative for congestion and sore throat.    Respiratory:  Positive for shortness of breath. Negative for cough.    Cardiovascular:  Positive for chest pain. Negative for palpitations.   Gastrointestinal:  Negative for abdominal pain and nausea.   Genitourinary:  Negative for dysuria and hematuria.   Musculoskeletal:  Negative for arthralgias and myalgias.   Neurological:  Negative for dizziness and weakness.       Physical Exam     Initial Vitals [08/27/24 1010]   BP Pulse Resp Temp SpO2   128/86 78 14 98.3 °F (36.8 °C) 99 %      MAP       --         Physical Exam    Nursing note and vitals reviewed.  Constitutional: She appears  well-developed and well-nourished.   HENT:   Head: Normocephalic and atraumatic.   Eyes: EOM are normal. Pupils are equal, round, and reactive to light.   Neck: Neck supple.   Normal range of motion.  Cardiovascular:  Normal rate, regular rhythm and normal heart sounds.           Pulmonary/Chest: Breath sounds normal. No respiratory distress. She has no wheezes. She has no rhonchi. She has no rales.   Abdominal: Abdomen is soft. There is no abdominal tenderness.   Musculoskeletal:         General: No edema. Normal range of motion.      Cervical back: Normal range of motion and neck supple.     Neurological: She is alert and oriented to person, place, and time.   Skin: Skin is warm and dry.         ED Course   Procedures  Labs Reviewed   COMPREHENSIVE METABOLIC PANEL - Abnormal       Result Value    Sodium 138      Potassium 4.2      Chloride 108 (*)     CO2 25      Glucose 97      Blood Urea Nitrogen 15.2      Creatinine 0.78      Calcium 9.2      Protein Total 6.7      Albumin 3.3 (*)     Globulin 3.4      Albumin/Globulin Ratio 1.0 (*)     Bilirubin Total 0.3      ALP 74      ALT 36      AST 20      eGFR >60      Anion Gap 5.0      BUN/Creatinine Ratio 19     COVID/FLU A&B PCR - Abnormal    Influenza A PCR Not Detected      Influenza B PCR Not Detected      SARS-CoV-2 PCR Detected (*)     Narrative:     Ct = 36.8  The Xpert Xpress SARS-CoV-2/FLU/RSV plus is a rapid, multiplexed real-time PCR test intended for the simultaneous qualitative detection and differentiation of SARS-CoV-2, Influenza A, Influenza B, and respiratory syncytial virus (RSV) viral RNA in either nasopharyngeal swab or nasal swab specimens.         CBC WITH DIFFERENTIAL - Abnormal    WBC 6.11      RBC 4.70      Hgb 13.6      Hct 41.7      MCV 88.7      MCH 28.9      MCHC 32.6 (*)     RDW 13.0      Platelet 274      MPV 11.8 (*)     Neut % 50.8      Lymph % 38.5      Mono % 7.9      Eos % 2.1      Basophil % 0.5      Lymph # 2.35      Neut # 3.11       Mono # 0.48      Eos # 0.13      Baso # 0.03      IG# 0.01      IG% 0.2      NRBC% 0.0     B-TYPE NATRIURETIC PEPTIDE - Normal    Natriuretic Peptide <10.0     MAGNESIUM - Normal    Magnesium Level 2.20     PROTIME-INR - Normal    PT 12.9      INR 1.0     TROPONIN I - Normal    Troponin-I <0.010     CBC W/ AUTO DIFFERENTIAL    Narrative:     The following orders were created for panel order CBC auto differential.  Procedure                               Abnormality         Status                     ---------                               -----------         ------                     CBC with Differential[[id number]]       Abnormal            Final result                 Please view results for these tests on the individual orders.   EXTRA TUBES    Narrative:     The following orders were created for panel order EXTRA TUBES.  Procedure                               Abnormality         Status                     ---------                               -----------         ------                     Gold Top Hold[[id number]]                                   In process                   Please view results for these tests on the individual orders.   GOLD TOP HOLD   POCT URINE PREGNANCY    POC Preg Test, Ur Negative       Acceptable Yes       EKG Readings: (Independently Interpreted)   Initial Reading: No STEMI. Rhythm: Normal Sinus Rhythm. Heart Rate: 79. Ectopy: No Ectopy. Conduction: Normal. Axis: Normal.     ECG Results              EKG 12-lead (Chest Pain) Age >30 (In process)        Collection Time Result Time QRS Duration OHS QTC Calculation    08/27/24 09:46:39 08/27/24 10:15:54 74 424                     In process by Interface, Lab In [facility] (08/27/24 10:16:01)                   Narrative:    Test Reason : R07.9,    Vent. Rate : 079 BPM     Atrial Rate : 079 BPM     P-R Int : 166 ms          QRS Dur : 074 ms      QT Int : 370 ms       P-R-T Axes : 066 014 057 degrees     QTc Int : 424  ms    Normal sinus rhythm  Normal ECG  When compared with ECG of 03-JAN-2024 19:07,  No significant change was found    Referred By:             Confirmed By:                                   Imaging Results              X-Ray Chest AP Portable (Final result)  Result time 08/27/24 10:55:41      Final result by Quirino Lees MD (08/27/24 10:55:41)                   Impression:      No acute findings.      Electronically signed by: Quirino Lees  Date:    08/27/2024  Time:    10:55               Narrative:    EXAMINATION:  XR CHEST AP PORTABLE    CLINICAL HISTORY:  Shortness of breath;    COMPARISON:  17 November 2023    FINDINGS:  Frontal view of the chest was obtained. The heart is not enlarged.  Lungs are clear.  There is no pneumothorax or significant effusion.                                       Medications - No data to display  Medical Decision Making  Vital signs are stable.  EKGs without concerning acute ischemic changes.  CBC, CMP, troponin, BNP reassuring.  Chest x-ray is clear.  COVID test was positive likely the cause of her symptoms.  Given her reassuring workup here appropriate for discharge, supportive care instructions were given.    Amount and/or Complexity of Data Reviewed  Labs: ordered. Decision-making details documented in ED Course.  Radiology: ordered. Decision-making details documented in ED Course.  ECG/medicine tests: ordered and independent interpretation performed. Decision-making details documented in ED Course.      Additional MDM:   Differential Diagnosis:   Pneumonia, Asthma exacerbation, COPD exacerbation, Pericardial Effusion, Hear Failure, Pulmonary Emboli, Pleural effusion, malignancy, among others      PERC Rule:   Age is greater than or equal to 50 = 0.0  Heart Rate is greater than or equal to 100 = 0.0  SaO2 on room air < 95% = 0.0  Unilateral leg swelling = 0.0  Hemoptysis = 0.0  Recent surgery or trauma = 0.0  Prior PE or DVT =  0.0  Hormone use = 0.00  PERC Score =  0        Well's Criteria Score:  -Clinical symptoms of DVT (leg swelling, pain with palpation) = 0.0  -PE is #1 diagnosis OR equally likely =            0.0  -Heart Rate >100 =   0.0  -Immobilization (= or > than 3 days) or surgery in the previous 4 weeks = 0.0  -Previous DVT/PE = 0.0  -Hemoptysis =          0.0  -Malignancy =           0.0  Well's Probability Score =    0      Heart Score:    History:          Slightly suspicious.  ECG:             Normal  Age:               Less than 45 years  Risk factors: 1-2 risk factors  Troponin:       Less than or equal to normal limit  Heart Score = 1                                       Clinical Impression:  Final diagnoses:  [R07.9] Chest pain  [U07.1] COVID-19 (Primary)          ED Disposition Condition    Discharge Stable          ED Prescriptions    None       Follow-up Information       Follow up With Specialties Details Why Contact Info    [facility] - Emergency Dept Emergency Medicine Go to  If symptoms worsen [address] [facility] [address] [phone]    Cristela Benson, FNP Family Medicine Call  As needed [address]  [address]  [facility] [address] [phone]               Adam Harper MD  08/27/24 7073

## 2024-09-12 DIAGNOSIS — I10 HYPERTENSION, UNSPECIFIED TYPE: ICD-10-CM

## 2024-09-12 RX ORDER — ASPIRIN 81 MG/1
81 TABLET ORAL DAILY
Qty: 30 TABLET | Refills: 11 | Status: SHIPPED | OUTPATIENT
Start: 2024-09-12

## 2024-09-12 RX ORDER — METOPROLOL TARTRATE 25 MG/1
25 TABLET, FILM COATED ORAL 2 TIMES DAILY
Qty: 60 TABLET | Refills: 11 | Status: SHIPPED | OUTPATIENT
Start: 2024-09-12 | End: 2025-09-12

## 2024-09-17 ENCOUNTER — TELEPHONE (OUTPATIENT)
Dept: FAMILY MEDICINE | Facility: CLINIC | Age: 29
End: 2024-09-17
Payer: MEDICAID

## 2024-09-25 ENCOUNTER — TELEPHONE (OUTPATIENT)
Dept: GYNECOLOGY | Facility: CLINIC | Age: 29
End: 2024-09-25
Payer: MEDICAID

## 2024-09-25 NOTE — TELEPHONE ENCOUNTER
----- Message from Joseph Urias sent at 9/25/2024 10:35 AM CDT -----  The above patient called and asked to speak to marky. It is in regards to a full exam and she has some fertility questions. Please advise, thanks

## 2024-09-25 NOTE — TELEPHONE ENCOUNTER
Spoke to patient to inform that she has an upcoming appointment on 10/04/2024 and she can address any concerns and ask questions at that time. Patient verbalized understanding.

## 2024-10-04 ENCOUNTER — PROCEDURE VISIT (OUTPATIENT)
Dept: GYNECOLOGY | Facility: CLINIC | Age: 29
End: 2024-10-04
Payer: MEDICAID

## 2024-10-04 VITALS
HEIGHT: 64 IN | OXYGEN SATURATION: 100 % | BODY MASS INDEX: 50.02 KG/M2 | DIASTOLIC BLOOD PRESSURE: 85 MMHG | RESPIRATION RATE: 20 BRPM | WEIGHT: 293 LBS | SYSTOLIC BLOOD PRESSURE: 138 MMHG | HEART RATE: 98 BPM

## 2024-10-04 DIAGNOSIS — N93.9 ABNORMAL UTERINE BLEEDING (AUB): ICD-10-CM

## 2024-10-04 DIAGNOSIS — R87.622 LGSIL PAP SMEAR OF VAGINA: Primary | ICD-10-CM

## 2024-10-04 DIAGNOSIS — E28.2 PCOS (POLYCYSTIC OVARIAN SYNDROME): ICD-10-CM

## 2024-10-04 DIAGNOSIS — N87.9 CERVICAL DYSPLASIA: ICD-10-CM

## 2024-10-04 PROCEDURE — 88305 TISSUE EXAM BY PATHOLOGIST: CPT | Mod: TC,91

## 2024-10-04 PROCEDURE — 57454 BX/CURETT OF CERVIX W/SCOPE: CPT | Mod: PBBFAC

## 2024-10-04 NOTE — PROCEDURES
Colposcopy    Date/Time: 10/4/2024 10:00 AM    Performed by: Ani Mobley MD  Authorized by: Ani Mobley MD    Timeout:Immediately prior to procedure a time out was called to verify the correct patient, procedure, equipment, support staff and site/side marked as required    Colposcopy Site:  Cervix  Position:  Supine  Acrowhite Lesion? Yes (from 11-1 o'clock position just anterior to transformation zone, does not extend into endocervical canal)    Atypical Vessels: No    Transformation Zone Adequate?: Yes    Biopsy?: Yes         Location:  Cervix ((12 00))  ECC Performed?: Yes    LEEP Performed?: No    Estimated blood loss (cc):  1   Patient tolerated the procedure well with no immediate complications.   Post-operative instructions were provided for the patient.   Patient was discharged and will follow up if any complications occur      Physical Exam  Genitourinary:                    [facility] COLPOSCOPY CLINIC NOTE     Esthela Maddox is a 28 y.o.  referred to [facility] colposcopy clinic from    Patient reports Yes, current history of abnormal pap smear. Of note, patient was last seen by COMPA Momin and had workup for AUB (likely AUB-O as she is meeting 2/3 [place] criteria based upon hirstuism and oligomenorreha). Today she has many questions about her ability to conceive. Of note, patient states that she has always been in a same-sex relationship until 2024 when she became sexually active with a male partner. Patient reports long-standing history of oligomenorrhea (q2-3mo). However, most recently she has had menses every month for the last 3 mo. Endorses hirsutism, acne, and weight gain. However, upon chart review pt lost 15lbs within 4mo.      Pap History:   2016 ASCUS HPVneg  2019 LSIL, HPVneg  2020 LSIL, OHR+    2023 Pap NILM OHR +  2024 Pap NILM OHR +    Sexual History:    Number of sex partners: Current 1  Contraception:  PLAN CONTRACEPTION: no method  Future fertility desires: YES/NO:  "Yes  Smoking History: History smoking: none  HIV status: negative  HPV vaccination status: YES/NO: Yes s/p 3 ose     GYN History:  Last menstrual period:  (24)           Gynecology  OB History          0    Para   0    Term   0       0    AB   0    Living   0         SAB   0    IAB   0    Ectopic   0    Multiple   0    Live Births   0                Past Medical History:   Diagnosis Date    Abnormal Pap smear of cervix     Hypertension     Morbid obesity     SVT (supraventricular tachycardia)       History reviewed. No pertinent surgical history.     Social History     Tobacco Use    Smoking status: Never     Passive exposure: Never    Smokeless tobacco: Never   Substance Use Topics    Alcohol use: Yes     Comment: ocass    Drug use: Yes     Frequency: 2.0 times per week     Types: Marijuana       Review of Systems  Pertinent items are noted in HPI.     Objective:     /85   Pulse 98   Resp 20   Ht 5' 4" (1.626 m)   Wt (!) 176.5 kg (389 lb 3.2 oz)   LMP 2024 (Approximate)   SpO2 100%   BMI 66.81 kg/m²   Physical Exam:  Gen: Well-nourished, well-developed female appearing stated age. Alert, cooperative, in no acute distress.      SEE COLPOSCOPY PROCEDURE NOTE      Assessment:       28 y.o.  s/p colposcopy for cervical dysplasia.   1. LGSIL Pap smear of vagina  Specimen to Pathology Gynecology and Obstetrics      2. Cervical dysplasia        3. Abnormal uterine bleeding (AUB)        4. PCOS (polycystic ovarian syndrome)             COLPOSCOPIC IMPRESSION:  mild dysplasia; however, will follow-up pathology results     Plan:         Problem List Items Addressed This Visit          Renal/    Cervical dysplasia    Abnormal uterine bleeding (AUB)       Endocrine    PCOS (polycystic ovarian syndrome)     Other Visit Diagnoses       LGSIL Pap smear of vagina    -  Primary    Relevant Orders    Specimen to Pathology Gynecology and Obstetrics            plan; follow up: Notify " patient and PCP of test results.  Arrange additional treatment or follow up at that time.    RTC 1 month to review TVUS, PCOS labs, possible EMB, and discuss options for management of PCOS        Ani Mobley MD   PGY2, Obstetrics & Gynecology   [facility]

## 2024-10-06 PROBLEM — N93.9 ABNORMAL UTERINE BLEEDING (AUB): Status: ACTIVE | Noted: 2024-10-06

## 2024-10-06 PROBLEM — E28.2 PCOS (POLYCYSTIC OVARIAN SYNDROME): Status: ACTIVE | Noted: 2024-10-06

## 2024-10-06 PROBLEM — N87.9 CERVICAL DYSPLASIA: Status: ACTIVE | Noted: 2024-10-06

## 2024-10-09 LAB
ESTROGEN SERPL-MCNC: NORMAL PG/ML
INSULIN SERPL-ACNC: NORMAL U[IU]/ML
LAB AP CLINICAL INFORMATION: NORMAL
LAB AP GROSS DESCRIPTION: NORMAL
LAB AP REPORT FOOTNOTES: NORMAL

## 2024-10-20 ENCOUNTER — TELEPHONE (OUTPATIENT)
Dept: GYNECOLOGY | Facility: CLINIC | Age: 29
End: 2024-10-20
Payer: MEDICAID

## 2024-10-20 NOTE — TELEPHONE ENCOUNTER
MD called patient to discuss colposcopy results:     1. Cervix, 12 oclock, biopsy:   - Scant superficial strip of ectocervical epithelium, dysplasia/ROSS cannot be ruled out.       2. Endocervix, curretage:   - Fragments of ROSS, low-grade squamous intraepithelial lesion (LSIL) is favored.      Discussed colposcopy results, and although LSIL favored, given persistent OHPV+ disease for 3 years discussed recommendation to proceed with excisional procedure for further evaluation and management. Risks and benefits to LEEP vs CKC discussed. However, discussed difficulty with visualization during colposcopy as cervix extremely anteriorly deviated and excess vaginal mucosa obstructing view despite use of condom for retraction. Discussed concern for inadequate specimen with LEEP and higher risk of therapy injury to vaginal mucosa. Patient desires to proceed with CKC in OR.     Will send information to pre-op resident.     Patient and plan discussed with Dr. Day Mobley MD   PGY2, Obstetrics & Gynecology   [facility]

## 2024-10-21 ENCOUNTER — HOSPITAL ENCOUNTER (EMERGENCY)
Facility: HOSPITAL | Age: 29
Discharge: HOME | End: 2024-10-21
Attending: STUDENT IN AN ORGANIZED HEALTH CARE EDUCATION/TRAINING PROGRAM
Payer: MEDICAID

## 2024-10-21 VITALS
HEIGHT: 64 IN | BODY MASS INDEX: 50.02 KG/M2 | HEART RATE: 87 BPM | DIASTOLIC BLOOD PRESSURE: 77 MMHG | RESPIRATION RATE: 17 BRPM | TEMPERATURE: 99 F | OXYGEN SATURATION: 98 % | SYSTOLIC BLOOD PRESSURE: 154 MMHG | WEIGHT: 293 LBS

## 2024-10-21 DIAGNOSIS — L73.2 HYDRADENITIS: Primary | ICD-10-CM

## 2024-10-21 PROCEDURE — 99283 EMERGENCY DEPT VISIT LOW MDM: CPT

## 2024-10-21 RX ORDER — CLINDAMYCIN HYDROCHLORIDE 300 MG/1
300 CAPSULE ORAL EVERY 6 HOURS
Qty: 28 CAPSULE | Refills: 0 | Status: SHIPPED | OUTPATIENT
Start: 2024-10-21 | End: 2024-10-28

## 2024-10-29 ENCOUNTER — HOSPITAL ENCOUNTER (EMERGENCY)
Facility: HOSPITAL | Age: 29
Discharge: HOME | End: 2024-10-29
Attending: INTERNAL MEDICINE
Payer: MEDICAID

## 2024-10-29 VITALS
BODY MASS INDEX: 68.12 KG/M2 | DIASTOLIC BLOOD PRESSURE: 82 MMHG | HEART RATE: 88 BPM | SYSTOLIC BLOOD PRESSURE: 135 MMHG | RESPIRATION RATE: 18 BRPM | OXYGEN SATURATION: 99 % | TEMPERATURE: 97 F | WEIGHT: 293 LBS

## 2024-10-29 DIAGNOSIS — N63.12 BREAST LUMP ON RIGHT SIDE AT 2 O'CLOCK POSITION: Primary | ICD-10-CM

## 2024-10-29 LAB
B-HCG UR QL: NEGATIVE
CTP QC/QA: YES

## 2024-10-29 PROCEDURE — 81025 URINE PREGNANCY TEST: CPT | Performed by: PHYSICIAN ASSISTANT

## 2024-10-29 PROCEDURE — 96372 THER/PROPH/DIAG INJ SC/IM: CPT | Performed by: PHYSICIAN ASSISTANT

## 2024-10-29 PROCEDURE — 63600175 PHARM REV CODE 636 W HCPCS: Performed by: PHYSICIAN ASSISTANT

## 2024-10-29 PROCEDURE — 99284 EMERGENCY DEPT VISIT MOD MDM: CPT | Mod: 25

## 2024-10-29 RX ORDER — INDOMETHACIN 50 MG/1
50 CAPSULE ORAL 3 TIMES DAILY PRN
Qty: 15 CAPSULE | Refills: 0 | Status: SHIPPED | OUTPATIENT
Start: 2024-10-29

## 2024-10-29 RX ORDER — KETOROLAC TROMETHAMINE 30 MG/ML
30 INJECTION, SOLUTION INTRAMUSCULAR; INTRAVENOUS
Status: COMPLETED | OUTPATIENT
Start: 2024-10-29 | End: 2024-10-29

## 2024-10-29 RX ADMIN — KETOROLAC TROMETHAMINE 30 MG: 30 INJECTION, SOLUTION INTRAMUSCULAR; INTRAVENOUS at 11:10

## 2024-11-03 ENCOUNTER — HOSPITAL ENCOUNTER (EMERGENCY)
Facility: HOSPITAL | Age: 29
Discharge: HOME | End: 2024-11-03
Attending: EMERGENCY MEDICINE
Payer: MEDICAID

## 2024-11-03 VITALS
BODY MASS INDEX: 68.23 KG/M2 | DIASTOLIC BLOOD PRESSURE: 75 MMHG | RESPIRATION RATE: 19 BRPM | TEMPERATURE: 99 F | HEART RATE: 89 BPM | OXYGEN SATURATION: 99 % | WEIGHT: 293 LBS | SYSTOLIC BLOOD PRESSURE: 126 MMHG

## 2024-11-03 DIAGNOSIS — K52.9 GASTROENTERITIS: Primary | ICD-10-CM

## 2024-11-03 LAB
ALBUMIN SERPL-MCNC: 3.4 G/DL (ref 3.5–5)
ALBUMIN/GLOB SERPL: 0.8 RATIO (ref 1.1–2)
ALP SERPL-CCNC: 99 UNIT/L (ref 40–150)
ALT SERPL-CCNC: 36 UNIT/L (ref 0–55)
ANION GAP SERPL CALC-SCNC: 8 MEQ/L
AST SERPL-CCNC: 19 UNIT/L (ref 5–34)
B-HCG UR QL: NEGATIVE
BACTERIA #/AREA URNS AUTO: ABNORMAL /HPF
BASOPHILS # BLD AUTO: 0.03 X10(3)/MCL
BASOPHILS NFR BLD AUTO: 0.4 %
BILIRUB SERPL-MCNC: 0.4 MG/DL
BILIRUB UR QL STRIP.AUTO: NEGATIVE
BUN SERPL-MCNC: 11.1 MG/DL (ref 7–18.7)
CALCIUM SERPL-MCNC: 9.7 MG/DL (ref 8.4–10.2)
CHLORIDE SERPL-SCNC: 105 MMOL/L (ref 98–107)
CLARITY UR: CLEAR
CO2 SERPL-SCNC: 25 MMOL/L (ref 22–29)
COLOR UR AUTO: YELLOW
CREAT SERPL-MCNC: 0.8 MG/DL (ref 0.55–1.02)
CREAT/UREA NIT SERPL: 14
CTP QC/QA: YES
EOSINOPHIL # BLD AUTO: 0.08 X10(3)/MCL (ref 0–0.9)
EOSINOPHIL NFR BLD AUTO: 1.1 %
ERYTHROCYTE [DISTWIDTH] IN BLOOD BY AUTOMATED COUNT: 12.7 % (ref 11.5–17)
GFR SERPLBLD CREATININE-BSD FMLA CKD-EPI: >60 ML/MIN/1.73/M2
GLOBULIN SER-MCNC: 4.2 GM/DL (ref 2.4–3.5)
GLUCOSE SERPL-MCNC: 83 MG/DL (ref 74–100)
GLUCOSE UR QL STRIP: NORMAL
HCT VFR BLD AUTO: 41.5 % (ref 37–47)
HGB BLD-MCNC: 14.2 G/DL (ref 12–16)
HGB UR QL STRIP: NEGATIVE
HYALINE CASTS #/AREA URNS LPF: ABNORMAL /LPF
IMM GRANULOCYTES # BLD AUTO: 0.02 X10(3)/MCL (ref 0–0.04)
IMM GRANULOCYTES NFR BLD AUTO: 0.3 %
KETONES UR QL STRIP: NEGATIVE
LEUKOCYTE ESTERASE UR QL STRIP: NEGATIVE
LIPASE SERPL-CCNC: 20 U/L
LYMPHOCYTES # BLD AUTO: 1.49 X10(3)/MCL (ref 0.6–4.6)
LYMPHOCYTES NFR BLD AUTO: 20.6 %
MAGNESIUM SERPL-MCNC: 2.1 MG/DL (ref 1.6–2.6)
MCH RBC QN AUTO: 29.6 PG (ref 27–31)
MCHC RBC AUTO-ENTMCNC: 34.2 G/DL (ref 33–36)
MCV RBC AUTO: 86.5 FL (ref 80–94)
MONOCYTES # BLD AUTO: 0.63 X10(3)/MCL (ref 0.1–1.3)
MONOCYTES NFR BLD AUTO: 8.7 %
MUCOUS THREADS URNS QL MICRO: ABNORMAL /LPF
NEUTROPHILS # BLD AUTO: 4.97 X10(3)/MCL (ref 2.1–9.2)
NEUTROPHILS NFR BLD AUTO: 68.9 %
NITRITE UR QL STRIP: NEGATIVE
NRBC BLD AUTO-RTO: 0 %
PH UR STRIP: 6 [PH]
PLATELET # BLD AUTO: 279 X10(3)/MCL (ref 130–400)
PMV BLD AUTO: 11.2 FL (ref 7.4–10.4)
POTASSIUM SERPL-SCNC: 3.7 MMOL/L (ref 3.5–5.1)
PROT SERPL-MCNC: 7.6 GM/DL (ref 6.4–8.3)
PROT UR QL STRIP: NEGATIVE
RBC # BLD AUTO: 4.8 X10(6)/MCL (ref 4.2–5.4)
RBC #/AREA URNS AUTO: ABNORMAL /HPF
SODIUM SERPL-SCNC: 138 MMOL/L (ref 136–145)
SP GR UR STRIP.AUTO: 1.03 (ref 1–1.03)
SQUAMOUS #/AREA URNS LPF: ABNORMAL /HPF
UROBILINOGEN UR STRIP-ACNC: NORMAL
WBC # BLD AUTO: 7.22 X10(3)/MCL (ref 4.5–11.5)
WBC #/AREA URNS AUTO: ABNORMAL /HPF

## 2024-11-03 PROCEDURE — 36415 COLL VENOUS BLD VENIPUNCTURE: CPT | Performed by: EMERGENCY MEDICINE

## 2024-11-03 PROCEDURE — 83735 ASSAY OF MAGNESIUM: CPT | Performed by: EMERGENCY MEDICINE

## 2024-11-03 PROCEDURE — 81001 URINALYSIS AUTO W/SCOPE: CPT | Performed by: EMERGENCY MEDICINE

## 2024-11-03 PROCEDURE — 80053 COMPREHEN METABOLIC PANEL: CPT | Performed by: EMERGENCY MEDICINE

## 2024-11-03 PROCEDURE — 85025 COMPLETE CBC W/AUTO DIFF WBC: CPT | Performed by: EMERGENCY MEDICINE

## 2024-11-03 PROCEDURE — 99284 EMERGENCY DEPT VISIT MOD MDM: CPT | Mod: 25

## 2024-11-03 PROCEDURE — 83690 ASSAY OF LIPASE: CPT | Performed by: EMERGENCY MEDICINE

## 2024-11-03 PROCEDURE — 25000003 PHARM REV CODE 250: Performed by: EMERGENCY MEDICINE

## 2024-11-03 PROCEDURE — 81025 URINE PREGNANCY TEST: CPT | Performed by: EMERGENCY MEDICINE

## 2024-11-03 PROCEDURE — 96375 TX/PRO/DX INJ NEW DRUG ADDON: CPT

## 2024-11-03 PROCEDURE — 63600175 PHARM REV CODE 636 W HCPCS: Performed by: EMERGENCY MEDICINE

## 2024-11-03 PROCEDURE — 96374 THER/PROPH/DIAG INJ IV PUSH: CPT

## 2024-11-03 RX ORDER — ONDANSETRON HYDROCHLORIDE 2 MG/ML
4 INJECTION, SOLUTION INTRAVENOUS
Status: COMPLETED | OUTPATIENT
Start: 2024-11-03 | End: 2024-11-03

## 2024-11-03 RX ORDER — ONDANSETRON 4 MG/1
4 TABLET, FILM COATED ORAL EVERY 6 HOURS
Qty: 12 TABLET | Refills: 0 | Status: SHIPPED | OUTPATIENT
Start: 2024-11-03

## 2024-11-03 RX ORDER — KETOROLAC TROMETHAMINE 30 MG/ML
30 INJECTION, SOLUTION INTRAMUSCULAR; INTRAVENOUS
Status: COMPLETED | OUTPATIENT
Start: 2024-11-03 | End: 2024-11-03

## 2024-11-03 RX ADMIN — KETOROLAC TROMETHAMINE 30 MG: 30 INJECTION, SOLUTION INTRAMUSCULAR; INTRAVENOUS at 09:11

## 2024-11-03 RX ADMIN — SODIUM CHLORIDE 1000 ML: 9 INJECTION, SOLUTION INTRAVENOUS at 09:11

## 2024-11-03 RX ADMIN — ONDANSETRON 4 MG: 2 INJECTION INTRAMUSCULAR; INTRAVENOUS at 09:11

## 2024-11-03 RX ADMIN — ALUMINUM HYDROXIDE AND MAGNESIUM HYDROXIDE 30 ML: 200; 200 SUSPENSION ORAL at 09:11

## 2024-11-04 NOTE — ED PROVIDER NOTES
Encounter Date: 11/3/2024       History     Chief Complaint   Patient presents with    Vomiting    Dizziness     Patient  states  that this  has  been  going  on all day     48 hours of nausea, vomiting, diarrhea (watery).  Patient endorsing mild chills.  There is burning epigastric abdominal pain accompanying.  No urinary symptoms are endorsed.  Last normal menstrual period three weeks ago.        Review of patient's allergies indicates:  No Known Allergies  Past Medical History:   Diagnosis Date    Abnormal Pap smear of cervix     Hypertension     Morbid obesity     SVT (supraventricular tachycardia)      No past surgical history on file.  Family History   Problem Relation Name Age of Onset    Diabetes Mother      Hypertension Mother      Diabetes Sister      Stroke Sister      Hypertension Sister       Social History     Tobacco Use    Smoking status: Former     Types: Cigarettes     Passive exposure: Never    Smokeless tobacco: Never   Substance Use Topics    Alcohol use: Yes     Comment: ocass    Drug use: Yes     Frequency: 2.0 times per week     Types: Marijuana     Review of Systems    Physical Exam     Initial Vitals   BP Pulse Resp Temp SpO2   11/03/24 1921 11/03/24 1921 11/03/24 1921 11/03/24 1921 11/03/24 1949   128/81 109 20 99.3 °F (37.4 °C) 99 %      MAP       --                Physical Exam    Nursing note and vitals reviewed.  Constitutional: She appears well-developed and well-nourished. She is not diaphoretic. No distress.   HENT:   Head: Normocephalic and atraumatic.   Right Ear: External ear normal.   Left Ear: External ear normal.   Eyes: EOM are normal. Pupils are equal, round, and reactive to light. Right eye exhibits no discharge. Left eye exhibits no discharge.   Neck: Neck supple. No thyromegaly present. No tracheal deviation present. No JVD present.   Normal range of motion.  Cardiovascular:  Normal rate, regular rhythm, normal heart sounds and intact distal pulses.     Exam reveals no  gallop and no friction rub.       No murmur heard.  Pulmonary/Chest: Breath sounds normal. No stridor. No respiratory distress. She has no wheezes. She has no rhonchi. She has no rales.   Abdominal: Abdomen is soft. Bowel sounds are normal. She exhibits no distension. There is no abdominal tenderness. There is no rebound and no guarding.   Musculoskeletal:         General: No tenderness or edema. Normal range of motion.      Cervical back: Normal range of motion and neck supple.     Neurological: She is alert and oriented to person, place, and time. She has normal strength. No cranial nerve deficit or sensory deficit. GCS score is 15. GCS eye subscore is 4. GCS verbal subscore is 5. GCS motor subscore is 6.   Skin: Skin is warm and dry. Capillary refill takes less than 2 seconds. No rash and no abscess noted. No erythema. No pallor.   Psychiatric: She has a normal mood and affect. Her behavior is normal. Judgment and thought content normal.         ED Course   Procedures  Labs Reviewed   COMPREHENSIVE METABOLIC PANEL - Abnormal       Result Value    Sodium 138      Potassium 3.7      Chloride 105      CO2 25      Glucose 83      Blood Urea Nitrogen 11.1      Creatinine 0.80      Calcium 9.7      Protein Total 7.6      Albumin 3.4 (*)     Globulin 4.2 (*)     Albumin/Globulin Ratio 0.8 (*)     Bilirubin Total 0.4      ALP 99      ALT 36      AST 19      eGFR >60      Anion Gap 8.0      BUN/Creatinine Ratio 14     URINALYSIS - Abnormal    Color, UA Yellow      Appearance, UA Clear      Specific Gravity, UA 1.026      pH, UA 6.0      Protein, UA Negative      Glucose, UA Normal      Ketones, UA Negative      Blood, UA Negative      Bilirubin, UA Negative      Urobilinogen, UA Normal      Nitrites, UA Negative      Leukocyte Esterase, UA Negative      RBC, UA 0-5      WBC, UA 0-5      Bacteria, UA Trace (*)     Squamous Epithelial Cells, UA Few (*)     Mucous, UA Trace (*)     Hyaline Casts, UA None Seen     CBC WITH  DIFFERENTIAL - Abnormal    WBC 7.22      RBC 4.80      Hgb 14.2      Hct 41.5      MCV 86.5      MCH 29.6      MCHC 34.2      RDW 12.7      Platelet 279      MPV 11.2 (*)     Neut % 68.9      Lymph % 20.6      Mono % 8.7      Eos % 1.1      Basophil % 0.4      Lymph # 1.49      Neut # 4.97      Mono # 0.63      Eos # 0.08      Baso # 0.03      IG# 0.02      IG% 0.3      NRBC% 0.0     MAGNESIUM - Normal    Magnesium Level 2.10     LIPASE - Normal    Lipase Level 20     CBC W/ AUTO DIFFERENTIAL    Narrative:     The following orders were created for panel order CBC auto differential.  Procedure                               Abnormality         Status                     ---------                               -----------         ------                     CBC with Differential[[id number]]       Abnormal            Final result                 Please view results for these tests on the individual orders.   POCT URINE PREGNANCY    POC Preg Test, Ur Negative       Acceptable Yes            Imaging Results    None          Medications   sodium chloride 0.9% bolus 1,000 mL 1,000 mL (1,000 mLs Intravenous New Bag 11/3/24 2147)   ondansetron injection 4 mg (4 mg Intravenous Given 11/3/24 2148)   aluminum-magnesium hydroxide 200-200 mg/5 mL suspension 30 mL (30 mLs Oral Given 11/3/24 2148)   ketorolac injection 30 mg (30 mg Intravenous Given 11/3/24 2148)     Medical Decision Making  Features seemingly most compatible with viral gastroenteritis.    Amount and/or Complexity of Data Reviewed  Labs: ordered. Decision-making details documented in ED Course.     Details: As above;    Risk  OTC drugs.  Prescription drug management.  Risk Details: Risk found sufficient to warrant expanded evaluation with objective data.  The data resulted in found reassuring.  Symptoms are improved with conservative interventions.  Plan home with antipyretics, anticipatory guidance, return precautions, follow-up instructions.   Discharged in stable condition without event.               ED Course as of 11/03/24 2232   Sun Nov 03, 2024 2230 Negative urine pregnancy test; [CT]   2230 Reassuring chemistries; [CT]   2230 Negative lipase; [CT]   2230 Reassuring hemogram; [CT]   2230 Contaminated urinalysis, unconvincing as UTI; [CT]      ED Course User Index  [CT] Edin Mcgovern MD                           Clinical Impression:  Final diagnoses:  [K52.9] Gastroenteritis (Primary)          ED Disposition Condition    Discharge Stable          ED Prescriptions       Medication Sig Dispense Start Date End Date Auth. Provider    ondansetron (ZOFRAN) 4 MG tablet Take 1 tablet (4 mg total) by mouth every 6 (six) hours. 12 tablet 11/3/2024 -- Edin Mcgovern MD          Follow-up Information       Follow up With Specialties Details Why Contact Info    [facility] Dept Emergency Medicine  As needed, If symptoms worsen [address] [address] [phone]    Cristela Benson, P Family Medicine Call   [address]  [address] [facility] [id number] [phone]               Edin Mcgovern MD  11/03/24 2232

## 2024-11-14 ENCOUNTER — OFFICE VISIT (OUTPATIENT)
Dept: SURGERY | Facility: CLINIC | Age: 29
End: 2024-11-14
Payer: MEDICAID

## 2024-11-14 VITALS
HEIGHT: 64 IN | BODY MASS INDEX: 50.02 KG/M2 | OXYGEN SATURATION: 99 % | SYSTOLIC BLOOD PRESSURE: 120 MMHG | TEMPERATURE: 98 F | HEART RATE: 91 BPM | DIASTOLIC BLOOD PRESSURE: 79 MMHG | WEIGHT: 293 LBS

## 2024-11-14 DIAGNOSIS — N63.12 BREAST LUMP ON RIGHT SIDE AT 2 O'CLOCK POSITION: ICD-10-CM

## 2024-11-14 PROCEDURE — 99215 OFFICE O/P EST HI 40 MIN: CPT | Mod: PBBFAC

## 2024-11-14 NOTE — PROGRESS NOTES
[facility] General Surgery Clinic Note    HPI: Esthela Maddox is a 29 y.o. female with a history of obesity, SVT, and HTN presented to the clinic complaining of acute, intermittent, sharp, painful lump to right breast. Reports she first noticed it two weeks prior to presenting to the ED on 10/24/24 and it has not gone away. She has never had a mammogram. Mass is tender to touch. Denies any overlying skin changes, fever, chills, breast swelling, nipple discharge. Patient denies history of HRT. She used OCPs for 1 year. . Patient is nulliparous with first menarche at age of 13. Patient has family history of breast cancer (aunt and maternal grandmother).     PMH:   Past Medical History:   Diagnosis Date    Abnormal Pap smear of cervix     Hypertension     Morbid obesity     SVT (supraventricular tachycardia)       Meds:   Current Outpatient Medications:     albuterol (VENTOLIN HFA) 90 mcg/actuation inhaler, Inhale 2 puffs into the lungs every 6 (six) hours as needed for Wheezing. Rescue (Patient not taking: Reported on 10/4/2024), Disp: 18 g, Rfl: 0    aspirin (ECOTRIN) 81 MG EC tablet, Take 1 tablet (81 mg total) by mouth once daily., Disp: 30 tablet, Rfl: 11    fluticasone propionate (FLONASE) 50 mcg/actuation nasal spray, 1 spray (50 mcg total) by Each Nostril route 2 (two) times daily as needed for Rhinitis. (Patient not taking: Reported on 10/4/2024), Disp: 16 g, Rfl: 0    indomethacin (INDOCIN) 50 MG capsule, Take 1 capsule (50 mg total) by mouth 3 (three) times daily as needed (pain)., Disp: 15 capsule, Rfl: 0    meclizine (ANTIVERT) 25 mg tablet, Take 1 tablet (25 mg total) by mouth 3 (three) times daily as needed. (Patient not taking: Reported on 10/4/2024), Disp: 20 tablet, Rfl: 0    metFORMIN (GLUCOPHAGE-XR) 500 MG ER 24hr tablet, Take 1 tablet (500 mg total) by mouth daily with dinner or evening meal. If tolerated may increase to 2 tablets with dinner after 2 weeks., Disp: 60 tablet, Rfl: 12    metoprolol  tartrate (LOPRESSOR) 25 MG tablet, Take 1 tablet (25 mg total) by mouth 2 (two) times daily., Disp: 60 tablet, Rfl: 11    omeprazole (PRILOSEC) 40 MG capsule, Take 1 capsule (40 mg total) by mouth 2 (two) times daily before meals. for 14 days, Disp: 28 capsule, Rfl: 0    ondansetron (ZOFRAN) 4 MG tablet, Take 1 tablet (4 mg total) by mouth every 6 (six) hours., Disp: 12 tablet, Rfl: 0    ondansetron (ZOFRAN-ODT) 4 MG TbDL, Take 1 tablet (4 mg total) by mouth every 6 (six) hours as needed. (Patient not taking: Reported on 10/4/2024), Disp: 15 tablet, Rfl: 0  Allergies: Review of patient's allergies indicates:  No Known Allergies  Social History:   Social History     Tobacco Use    Smoking status: Former     Types: Cigarettes     Passive exposure: Never    Smokeless tobacco: Never   Substance Use Topics    Alcohol use: Yes     Comment: ocass    Drug use: Yes     Frequency: 2.0 times per week     Types: Marijuana     Family History:   Family History   Problem Relation Name Age of Onset    Diabetes Mother      Hypertension Mother      Diabetes Sister      Stroke Sister      Hypertension Sister       Surgical History:   Past Surgical History:   Procedure Laterality Date    NO PAST SURGERIES       Review of Systems:  Skin: No rashes or itching. Breast lump. Hidradenitis suppurativa on axilla.  Head: Denies headache or recent trauma.  Eyes: Denies eye pain or double vision.  Neck: Denies swelling or hoarseness of voice.  Respiratory: Denies shortness of breath or chest pain  Cardiac: Denies palpitations or swelling in hands/feet.  Gastrointestinal: Denies nausea, denies vomiting.   Urinary: Denies dysuria or hematuria.  Vascular: Denies claudication or leg swelling.  Neuro: Denies motor deficits. Denies weakness.  Endocrine: Denies excessive sweating or cold intolerance.  Psych: Denies memory problems. Denies anxiety.    Objective:    Vitals:  Vitals:    11/14/24 1245   BP: 120/79   Pulse: 91   Temp: 98 °F (36.7 °C)         Physical Exam:  Gen: NAD  Neuro: awake, alert, answering questions appropriately  CV: RRR  Resp: non-labored breathing, KAREEM  Breast:  -R: Soft, no skin changes, dimpling, or discharge. Uniform texture. No palpable nodes. Subcentimeter, slightly tender nodule w/o overlying skin changes at the medial border of the R breast at # o'clock position.  -L: Soft, no skin changes, dimpling, or discharge. Uniform texture. No palpable nodes or masses  Abd: soft, ND, NT  : Deferred  Ext: moves all 4 spontaneously and purposefully  Skin: warm, well perfused. Tender breast lump. Hidradenitis suppurativa on axilla.      Assessment/Plan:  Esthela Maddox is a 29 y.o. female with a history of obesity, SVT, and HTN presents to clinic for suspected R breast abscess vs malignancy.     - Order U/S and Mammogram  - 1 month f/u     David Catalan, MS3  [employer] [place]    Above patient seen and evaluated independently. Agree with note. Edits made as necessary.    - Follow up with imaging in 1 month.    Damir Gordon MD  [facility]

## 2024-11-14 NOTE — PROGRESS NOTES
I have reviewed the notes, assessments, and/or procedures performed by the resident, I concur with her/his documentation of Esthela Maddox.     Follow up with diagnostic breast imaging    Su Singh MD

## 2024-11-14 NOTE — PROGRESS NOTES
Patient seen and examined by Dr. YAMILKA Gordon. Will return in one month with imaging. Written and verbal discharge instructions given.

## 2024-11-14 NOTE — PROGRESS NOTES
[facility] General Surgery Clinic Note    HPI: Esthela Maddox is a 29 y.o. female with a history of obesity, SVT, and HTN presented to the clinic complaining of acute, intermittent, sharp, painful lump to right breast. Reports she first noticed it two weeks prior to presenting to the ED on 10/24/24 and it has not gone away. She has never had a mammogram. Mass is tender to touch. Denies any overlying skin changes, fever, chills, breast swelling, nipple discharge. Patient denies history of HRT. She used OCPs for 1 year. . Patient is nulliparous with first menarche at age of 13. Patient has family history of breast cancer (aunt and maternal grandmother).     PMH:   Past Medical History:   Diagnosis Date    Abnormal Pap smear of cervix     Hypertension     Morbid obesity     SVT (supraventricular tachycardia)       Meds:   Current Outpatient Medications:     albuterol (VENTOLIN HFA) 90 mcg/actuation inhaler, Inhale 2 puffs into the lungs every 6 (six) hours as needed for Wheezing. Rescue (Patient not taking: Reported on 10/4/2024), Disp: 18 g, Rfl: 0    aspirin (ECOTRIN) 81 MG EC tablet, Take 1 tablet (81 mg total) by mouth once daily., Disp: 30 tablet, Rfl: 11    fluticasone propionate (FLONASE) 50 mcg/actuation nasal spray, 1 spray (50 mcg total) by Each Nostril route 2 (two) times daily as needed for Rhinitis. (Patient not taking: Reported on 10/4/2024), Disp: 16 g, Rfl: 0    indomethacin (INDOCIN) 50 MG capsule, Take 1 capsule (50 mg total) by mouth 3 (three) times daily as needed (pain)., Disp: 15 capsule, Rfl: 0    meclizine (ANTIVERT) 25 mg tablet, Take 1 tablet (25 mg total) by mouth 3 (three) times daily as needed. (Patient not taking: Reported on 10/4/2024), Disp: 20 tablet, Rfl: 0    metFORMIN (GLUCOPHAGE-XR) 500 MG ER 24hr tablet, Take 1 tablet (500 mg total) by mouth daily with dinner or evening meal. If tolerated may increase to 2 tablets with dinner after 2 weeks., Disp: 60 tablet, Rfl: 12    metoprolol  tartrate (LOPRESSOR) 25 MG tablet, Take 1 tablet (25 mg total) by mouth 2 (two) times daily., Disp: 60 tablet, Rfl: 11    omeprazole (PRILOSEC) 40 MG capsule, Take 1 capsule (40 mg total) by mouth 2 (two) times daily before meals. for 14 days, Disp: 28 capsule, Rfl: 0    ondansetron (ZOFRAN) 4 MG tablet, Take 1 tablet (4 mg total) by mouth every 6 (six) hours., Disp: 12 tablet, Rfl: 0    ondansetron (ZOFRAN-ODT) 4 MG TbDL, Take 1 tablet (4 mg total) by mouth every 6 (six) hours as needed. (Patient not taking: Reported on 10/4/2024), Disp: 15 tablet, Rfl: 0  Allergies: Review of patient's allergies indicates:  No Known Allergies  Social History:   Social History     Tobacco Use    Smoking status: Former     Types: Cigarettes     Passive exposure: Never    Smokeless tobacco: Never   Substance Use Topics    Alcohol use: Yes     Comment: ocass    Drug use: Yes     Frequency: 2.0 times per week     Types: Marijuana     Family History:   Family History   Problem Relation Name Age of Onset    Diabetes Mother      Hypertension Mother      Diabetes Sister      Stroke Sister      Hypertension Sister       Surgical History:   Past Surgical History:   Procedure Laterality Date    NO PAST SURGERIES       Review of Systems:  Skin: No rashes or itching. Breast lump. Hidradenitis suppurativa on axilla.  Head: Denies headache or recent trauma.  Eyes: Denies eye pain or double vision.  Neck: Denies swelling or hoarseness of voice.  Respiratory: Denies shortness of breath or chest pain  Cardiac: Denies palpitations or swelling in hands/feet.  Gastrointestinal: Denies nausea, denies vomiting.   Urinary: Denies dysuria or hematuria.  Vascular: Denies claudication or leg swelling.  Neuro: Denies motor deficits. Denies weakness.  Endocrine: Denies excessive sweating or cold intolerance.  Psych: Denies memory problems. Denies anxiety.    Objective:    Vitals:  Vitals:    11/14/24 1245   BP: 120/79   Pulse: 91   Temp: 98 °F (36.7 °C)         Physical Exam:  Gen: NAD  Neuro: awake, alert, answering questions appropriately  CV: RRR  Resp: non-labored breathing, KAREEM  Breast:  -R: Soft, no skin changes, dimpling, or discharge. Uniform texture. No palpable nodes. Subcentimeter, slightly tender nodule w/o overlying skin changes at the medial border of the R breast at # o'clock position.  -L: Soft, no skin changes, dimpling, or discharge. Uniform texture. No palpable nodes or masses  Abd: soft, ND, NT  : Deferred  Ext: moves all 4 spontaneously and purposefully  Skin: warm, well perfused. Tender breast lump. Hidradenitis suppurativa on axilla.      Assessment/Plan:  Esthela Maddox is a 29 y.o. female with a history of obesity, SVT, and HTN presents to clinic for suspected R breast abscess vs malignancy.     - Order U/S and Mammogram  - 1 month f/u     David Catalan, MS3  [employer] [place]    Above patient seen and evaluated independently. Agree with note. Edits made as necessary.    - Follow up with imaging in 1 month.    Damir Gordon MD  [facility]

## 2024-11-16 ENCOUNTER — TELEPHONE (OUTPATIENT)
Dept: GYNECOLOGY | Facility: CLINIC | Age: 29
End: 2024-11-16
Payer: MEDICAID

## 2024-11-16 NOTE — TELEPHONE ENCOUNTER
Have attempted to call number on file and listed  to reach patient about scheduling CKC, no answer on multiple attempts. Currently scheduled for EMB in clinic on 11/20, will discuss at that visit. If she does not come to that visit, will send letter to address on file.    Mehrdad Parker MD  [facility], PGY-3

## 2024-11-21 ENCOUNTER — TELEPHONE (OUTPATIENT)
Dept: GYNECOLOGY | Facility: CLINIC | Age: 29
End: 2024-11-21
Payer: MEDICAID

## 2024-11-21 DIAGNOSIS — N87.9 CERVICAL DYSPLASIA: Primary | ICD-10-CM

## 2024-11-21 DIAGNOSIS — N93.9 ABNORMAL UTERINE BLEEDING (AUB): ICD-10-CM

## 2024-11-21 NOTE — TELEPHONE ENCOUNTER
Called patient to schedule CKC, however, on chart review and discussion with patient, she had recent hospitalizations for chest pain and shortness of breath but has not yet seen a cardiologist. Known history of HTN and SVT. Will request cardiac clearance prior to scheduling surgery. Patient also missed appointment yesterday for endometrial biopsy which needs to be done prior to surgery as results may change planned procedures. Message sent to staff to facilitate cardiology clearance and reschedule EMB.    Mehrdad Parker MD  [facility], PGY-3

## 2024-11-25 ENCOUNTER — HOSPITAL ENCOUNTER (EMERGENCY)
Facility: HOSPITAL | Age: 29
Discharge: LEFT BEFORE DISCHARGE | End: 2024-11-25
Attending: EMERGENCY MEDICINE
Payer: MEDICAID

## 2024-11-25 VITALS
WEIGHT: 293 LBS | RESPIRATION RATE: 18 BRPM | HEART RATE: 98 BPM | TEMPERATURE: 98 F | BODY MASS INDEX: 50.02 KG/M2 | DIASTOLIC BLOOD PRESSURE: 81 MMHG | SYSTOLIC BLOOD PRESSURE: 162 MMHG | OXYGEN SATURATION: 99 % | HEIGHT: 64 IN

## 2024-11-25 DIAGNOSIS — N92.6 MISSED PERIOD: Primary | ICD-10-CM

## 2024-11-25 LAB
B-HCG UR QL: NEGATIVE
CTP QC/QA: YES

## 2024-11-25 PROCEDURE — 99282 EMERGENCY DEPT VISIT SF MDM: CPT

## 2024-11-25 PROCEDURE — 81025 URINE PREGNANCY TEST: CPT | Performed by: NURSE PRACTITIONER

## 2024-11-25 NOTE — ED PROVIDER NOTES
"Encounter Date: 11/25/2024       History     Chief Complaint   Patient presents with    Nausea     For the past 2 weeks, no vomiting, "oh and my cycle is late too"     29 year old female presents with nausea. She is about a week late for her period. She took an at home pregnancy test and it was positive.     The history is provided by the patient. No  was used.     Review of patient's allergies indicates:  No Known Allergies  Past Medical History:   Diagnosis Date    Abnormal Pap smear of cervix     Hypertension     Morbid obesity     SVT (supraventricular tachycardia)      Past Surgical History:   Procedure Laterality Date    NO PAST SURGERIES       Family History   Problem Relation Name Age of Onset    Diabetes Mother      Hypertension Mother      Diabetes Sister      Stroke Sister      Hypertension Sister       Social History     Tobacco Use    Smoking status: Former     Types: Cigarettes     Passive exposure: Never    Smokeless tobacco: Never   Substance Use Topics    Alcohol use: Yes     Comment: ocass    Drug use: Yes     Frequency: 2.0 times per week     Types: Marijuana     Review of Systems   Constitutional:  Negative for fever.   HENT:  Negative for sore throat.    Respiratory:  Negative for shortness of breath.    Cardiovascular:  Negative for chest pain.   Gastrointestinal:  Positive for nausea.   Genitourinary:  Negative for dysuria.   Musculoskeletal:  Negative for back pain.   Skin:  Negative for rash.   Neurological:  Negative for weakness.   Hematological:  Does not bruise/bleed easily.       Physical Exam     Initial Vitals [11/25/24 1437]   BP Pulse Resp Temp SpO2   (!) 162/81 98 18 98.3 °F (36.8 °C) 99 %      MAP       --         Physical Exam    Nursing note and vitals reviewed.  Constitutional: She appears well-developed and well-nourished. She is Obese .   HENT:   Head: Normocephalic and atraumatic.   Eyes: Conjunctivae and EOM are normal. Pupils are equal, round, and " reactive to light.   Neck: Neck supple.   Normal range of motion.  Cardiovascular:  Normal rate and regular rhythm.           Pulmonary/Chest: Breath sounds normal. No respiratory distress.   Abdominal: Abdomen is soft. Bowel sounds are normal. She exhibits no distension. There is no abdominal tenderness.   Musculoskeletal:         General: No edema. Normal range of motion.      Cervical back: Normal range of motion and neck supple.     Neurological: She is alert and oriented to person, place, and time. She has normal strength.   Skin: Skin is warm and dry.   Psychiatric: Thought content normal.         ED Course   Procedures  Labs Reviewed   POCT URINE PREGNANCY       Result Value    POC Preg Test, Ur Negative       Acceptable Yes            Imaging Results    None          Medications - No data to display  Medical Decision Making  29 year old female presents with nausea. She is about a week late for her period. She took an at home pregnancy test and it was positive.     UPT negative, patient requested blood test. Patient unable to be found for blood draw. She eloped.     Amount and/or Complexity of Data Reviewed  Labs: ordered. Decision-making details documented in ED Course.    Risk  Risk Details: Risk Details: Given strict ED return precautions. I have spoken with the patient and/or caregivers. I have explained the patient's condition, diagnoses and treatment plan based on the information available to me at this time. I have answered the patient's and/or caregiver's questions and addressed any concerns. The patient and/or caregivers have as good an understanding of the patient's diagnosis, condition and treatment plan as can be expected at this point. The vital signs have been stable. The patient's condition is stable and appropriate for discharge from the emergency department.      The patient will pursue further outpatient evaluation with the primary care physician or other designated or  consulting physician as outlined in the discharge instructions. The patient and/or caregivers are agreeable to this plan of care and follow-up instructions have been explained in detail. The patient and/or caregivers have received these instructions in written format and have expressed an understanding of the discharge instructions. The patient and/or caregivers are aware that any significant change in condition or worsening of symptoms should prompt an immediate return to this or the closest emergency department or a call to 911.           Additional MDM:   Differential Diagnosis:   Pregnancy, gastritis, GERD             ED Course as of 11/25/24 1647   Mon Nov 25, 2024   1638 Called lab to see why labs weren't drawn. It is unknown why lab is not drawn she will let phlebotomist know [LS]      ED Course User Index  [LS] Eunice Tavera FNP                     It is important that you follow up with your primary care provider or specialist if indicated for further evaluation, workup, and treatment as necessary. The exam and treatment you received in Emergency Department was for an urgent problem and NOT INTENDED AS COMPLETE CARE. It is important that you FOLLOW UP with a doctor for ongoing care. If your symptoms become WORSE or you DO NOT IMPROVE and you are unable to reach your health care provider, you should RETURN to the Emergency Department        Clinical Impression:  Final diagnoses:  [N92.6] Missed period (Primary)          ED Disposition Condition    [place]                 Eunice Tavera FNP  11/25/24 1649

## 2024-11-26 ENCOUNTER — TELEPHONE (OUTPATIENT)
Dept: GYNECOLOGY | Facility: CLINIC | Age: 29
End: 2024-11-26
Payer: MEDICAID

## 2024-11-26 NOTE — ED PROVIDER NOTES
Encounter Date: 10/21/2024       History     Chief Complaint   Patient presents with    Abscess     L axillary abscess, draining     See MDM    The history is provided by the patient.     Review of patient's allergies indicates:  No Known Allergies  Past Medical History:   Diagnosis Date    Abnormal Pap smear of cervix     Hypertension     Morbid obesity     SVT (supraventricular tachycardia)      Past Surgical History:   Procedure Laterality Date    NO PAST SURGERIES       Family History   Problem Relation Name Age of Onset    Diabetes Mother      Hypertension Mother      Diabetes Sister      Stroke Sister      Hypertension Sister       Social History     Tobacco Use    Smoking status: Former     Types: Cigarettes     Passive exposure: Never    Smokeless tobacco: Never   Substance Use Topics    Alcohol use: Yes     Comment: ocass    Drug use: Yes     Frequency: 2.0 times per week     Types: Marijuana     Review of Systems   Constitutional:  Negative for chills and fever.   HENT:  Negative for congestion and sore throat.    Respiratory:  Negative for cough and shortness of breath.    Cardiovascular:  Negative for chest pain and palpitations.   Gastrointestinal:  Negative for abdominal pain and nausea.   Genitourinary:  Negative for dysuria and hematuria.   Musculoskeletal:  Negative for arthralgias and myalgias.   Neurological:  Negative for dizziness and weakness.       Physical Exam     Initial Vitals [10/21/24 0333]   BP Pulse Resp Temp SpO2   (!) 154/77 87 17 98.6 °F (37 °C) 98 %      MAP       --         Physical Exam    Nursing note and vitals reviewed.  Constitutional: She appears well-developed and well-nourished.   HENT:   Head: Normocephalic and atraumatic.   Eyes: EOM are normal. Pupils are equal, round, and reactive to light.   Neck: Neck supple.   Normal range of motion.  Cardiovascular:  Normal rate and regular rhythm.           Pulmonary/Chest: Breath sounds normal. No respiratory distress.    Abdominal: Abdomen is soft. There is no abdominal tenderness.   Musculoskeletal:         General: No edema. Normal range of motion.      Cervical back: Normal range of motion and neck supple.     Neurological: She is alert and oriented to person, place, and time.   Skin: Skin is warm and dry. Abscess (Left axilla appearance of purulent drainage with tunneling tracts and indurations) noted.         ED Course   Procedures  Labs Reviewed - No data to display       Imaging Results    None          Medications - No data to display  Medical Decision Making  Patient with a history of recurrent axillary and groin abscesses with chronic  inflammation and induration consistent with hidradenitis.  Vital signs are stable.  Will place on clindamycin    Risk  Prescription drug management.                                      Clinical Impression:  Final diagnoses:  [L73.2] Hydradenitis (Primary)          ED Disposition Condition    Discharge Stable          ED Prescriptions       Medication Sig Dispense Start Date End Date Auth. Provider    clindamycin (CLEOCIN) 300 MG capsule () Take 1 capsule (300 mg total) by mouth every 6 (six) hours. for 7 days 28 capsule 10/21/2024 10/28/2024 Adam Harper MD          Follow-up Information       Follow up With Specialties Details Why Contact Info    [facility] Emergency Medicine Go to  If symptoms worsen [address] [facility] [address] [phone]    Cristela Benson, SAL Family Medicine Call  As needed [address]  [address]  [facility] [address] [phone]               Adam Harper MD  [phone]

## 2024-11-26 NOTE — TELEPHONE ENCOUNTER
EMB has been scheduled again.  A referral to [facility] has been submitted.

## 2024-11-26 NOTE — TELEPHONE ENCOUNTER
----- Message from Mehrdad Parker sent at 11/21/2024  5:21 PM CST -----  Regarding: Need cardiology clearance and visit for EMB prior to CKC  Hey Ms. Cedeno,    I just spoke to this patient who needs a CKC, however, has recent hospitalizations for chest pain and shortness of breath but has not yet seen a cardiologist. Can you help me get her established with cardiology and also get clearance from them?    Also, she missed an appointment yesterday for EMB for AUB. Can you re-schedule her for procedure visit in the next few weeks to get EMB before we book her for surgery as that may change the plan if she ends up needing a hysteroscopy too.    Thanks,    Mehrdad Parker MD  [employer] [facility], PGY-3

## 2024-12-10 ENCOUNTER — TELEPHONE (OUTPATIENT)
Dept: GYNECOLOGY | Facility: CLINIC | Age: 29
End: 2024-12-10
Payer: MEDICAID

## 2024-12-10 NOTE — TELEPHONE ENCOUNTER
Good afternoon!     Patient is needing to undergo a CKC with us. However she has had recent hospital admissions for chest pain and shortness of breath. She has not seen a cardiologist yet. Can patient get established with cardio and also get clearance from yall?     Please advise. Thank you!

## 2024-12-13 ENCOUNTER — TELEPHONE (OUTPATIENT)
Dept: GYNECOLOGY | Facility: CLINIC | Age: 29
End: 2024-12-13

## 2024-12-13 ENCOUNTER — PROCEDURE VISIT (OUTPATIENT)
Dept: GYNECOLOGY | Facility: CLINIC | Age: 29
End: 2024-12-13
Payer: MEDICAID

## 2024-12-13 VITALS
DIASTOLIC BLOOD PRESSURE: 81 MMHG | HEART RATE: 82 BPM | HEIGHT: 64 IN | SYSTOLIC BLOOD PRESSURE: 127 MMHG | RESPIRATION RATE: 22 BRPM | BODY MASS INDEX: 50.02 KG/M2 | WEIGHT: 293 LBS | TEMPERATURE: 98 F | OXYGEN SATURATION: 99 %

## 2024-12-13 DIAGNOSIS — N87.9 CERVICAL DYSPLASIA: ICD-10-CM

## 2024-12-13 DIAGNOSIS — N93.9 ABNORMAL UTERINE BLEEDING: Primary | ICD-10-CM

## 2024-12-13 LAB
B-HCG UR QL: NEGATIVE
CTP QC/QA: YES

## 2024-12-13 RX ORDER — DROSPIRENONE 4 MG/1
4 TABLET, FILM COATED ORAL DAILY
Qty: 30 TABLET | Refills: 11 | Status: SHIPPED | OUTPATIENT
Start: 2024-12-13

## 2024-12-13 NOTE — PROCEDURES
[facility] GYNECOLOGY CLINIC NOTE     Esthela Maddox is a 29 y.o.  presenting to GYN clinic for EMB in the setting of AUB-O (PCOS).     Pt without complaint today, states that last cycle was in October. Currently sexually active with male partner, not on contraception. Does desire future fertility.    Additional menstrual hx provided per prior visit 24:      Cycle lasted 5 days and changed pads every 1-2 hours, states it was heavy. Today, pt states she had cycle in  and May, prior to that she has likely not had cycles since last exam in . States periods have been irregular since 2018, prior to this time, cycles are occurring every 3-4 months prior. Pt was last seen in , she was to proceed with pelvic uls and wanted to think about IUD, however pelvic uls not completed and no f/u. PCOS labs in ,  were normal. Admits hirsutism. Last pelvic uls was .      Gynecology  Hx of CT s/p treatment  Hx of Syphilis s/p treatment   Hx of abnormal paps: Last NILM, HPV OHR+. Colpo with [facility] cannot rule out dysplasia, given persistent HPV recommendation for CKC.     OB History          0    Para   0    Term   0       0    AB   0    Living   0         SAB   0    IAB   0    Ectopic   0    Multiple   0    Live Births   0                Past Medical History:   Diagnosis Date    Abnormal Pap smear of cervix     Hypertension     Morbid obesity     SVT (supraventricular tachycardia)       Past Surgical History:   Procedure Laterality Date    NO PAST SURGERIES        Current Outpatient Medications   Medication Instructions    aspirin (ECOTRIN) 81 mg, Oral, Daily    metoprolol tartrate (LOPRESSOR) 25 mg, Oral, 2 times daily     Social History     Tobacco Use    Smoking status: Former     Types: Cigarettes     Passive exposure: Never    Smokeless tobacco: Never   Substance Use Topics    Alcohol use: Yes     Comment: ocass    Drug use: Yes     Frequency: 2.0 times per week     Types: Marijuana  "      Review of Systems  Pertinent items are noted in HPI.     Objective:     /81 (BP Location: Left arm, Patient Position: Sitting)   Pulse 82   Temp 98.3 °F (36.8 °C)   Resp (!) 22   Ht 5' 4" (1.626 m)   Wt (!) 182.8 kg (403 lb)   SpO2 99%   BMI 69.17 kg/m²   Physical Exam:  Gen: Well-nourished, well-developed female appearing stated age. Alert, cooperative, in no acute distress.  CV: Regular rate  Chest: No increased work of breathing   Abdomen: Soft, non-tender, no masses.  Extrem: Extremities normal, atraumatic, non-tender calves.  External genitalia: Normal female genetalia without lesion, discharge or tenderness.   Speculum Exam: Vaginal vault without discharge, nonodorous, no lesions/masses seen.  Cervical os visualized as closed, no lesions/masses.   Note: RN chaperone present for entirety of exam.    Procedures:     EMB Procedure Note    Date of procedure:  12/13/2024    Procedure(s):   EMB    Indication: Abnormal uterine bleeding.    Procedure technique:     Esthela Maddox was counseled on risks, benefits, alternatives to her procedure today. A consent form was reviewed and signed. All questions were answered to her satisfaction.    After discussing and obtaining informed consent, a time out was performed confirming her identity, surgical site, planned procedure and that all necessary equipment was available. She was positioned on the exam table in dorsal lithotomy position.   Speculum was placed with excellent visualization of the cervix. Cervix swabbed with betadine x 3. Single tooth tenaculum used to grasp the anterior lip of the cervix.  2 passes performed.  Specimen labeled and sent to pathology.  All instruments removed.  Tenaculum site hemostatic. Patient tolerated the procedure well.  Minimal EBL.  No complications.    Relevant Labs:   6.11>13.6/41.7<274    Relevant Imaging:  TVUS 07/24  FINDINGS:  Uterus measures 11.1 x 4.9 x 6.8 cm.  Endometrium measures 0.5 cm.  Multiple " nabothian cysts are present.     Right ovary is not identified.  Left ovary measures 2.6 cm.  Normal vascular flow is noted to the left ovary.  No adnexal masses or free fluid.     Impression:     1. No acute findings  2. Nabothian cysts.      Assessment:       29 y.o.  here for EMB in the setting of PCOS, pt tolerated well. Also .  1. Abnormal uterine bleeding  POCT urine pregnancy    T4, Free    Specimen to Pathology Gynecology and Obstetrics      2. Cervical dysplasia               Plan:     PCOS  - TSH, Free T4 ordered today for completion of work up however suspect menstrual irregularities due to PCOS  - EMB collected today   - Theoretical increased risk of VTE for pts taking combined OCPs with hx of SVT, therefore will try Slynd instead for endometrial protection, menstrual regulation, management of hirsutism.   - recommend weight loss of up to 5-10% of body weight to help restore cycle regularity   - Lipid Panel ordered per primary provider  - Pt started on metformin at last visit per NP    Cervical Dysplasia  -Re-Discussed recommendation for CKC, pt aware and amenable   - Will request cardiac risk stratification given SVT and HTN    Problem List Items Addressed This Visit       Cervical dysplasia     Other Visit Diagnoses       Abnormal uterine bleeding    -  Primary    Relevant Orders    POCT urine pregnancy (Completed)    T4, Free    Specimen to Pathology Gynecology and Obstetrics            Return to clinic for pre op visit for CKC     Discussed patient and plan with Dr. Provost Jeane Padgett MD   [facility] PGY-4

## 2024-12-13 NOTE — TELEPHONE ENCOUNTER
----- Message from Usha sent at 12/11/2024 12:53 PM CST -----  Good afternoon,    Patient is scheduled for 01/09/24 @ 8am for cardiac clearance.    Thank you,  Usha      Good afternoon!      Patient is needing to undergo a CKC with us. However she has had recent hospital admissions for chest pain and shortness of breath. She has not seen a cardiologist yet. Can patient get established with cardio and also get clearance from ya?      Please advise. Thank you!          LB    12/10/24  2:06 PM  Lakia Alaniz LPN routed this conversation to [facility]iology Clinical Support Staff · Me · Pao Henao LPN Jagneaux, Misty, LPN to Me · Lakia Alaniz LPN       12/10/24  2:11 PM   Please send referral  Lakia Alaniz LPN to Carmencita Mckinnon LPN · Me       12/10/24  3:37 PM   Good afternoon,    Referral is in her chart. It was placed on 11/26/24.

## 2024-12-23 ENCOUNTER — TELEPHONE (OUTPATIENT)
Dept: SURGERY | Facility: CLINIC | Age: 29
End: 2024-12-23
Payer: MEDICAID

## 2024-12-23 NOTE — TELEPHONE ENCOUNTER
----- Message from Melissa sent at 12/23/2024  9:32 AM CST -----  Regarding: RE: reschedule after imaging  Attempted to call patient-went straight to voicemail-letter mailed-appt canceled. Thanks  ----- Message -----  From: Carola Angeles LPN  Sent: 12/23/2024   9:14 AM CST  To: Isabel Maddox RN; Su Singh MD; #  Subject: FW: reschedule after imaging                     3 attempts by scheduling was done to get her mammogram imaging done. I attempted to contact patient and it was voicemail.   Melissa please cancel appointment for 12/26/24 if you don't mind please try patient again.   I will also attempt to call patient again today. Thanks.  ----- Message -----  From: Su Singh MD  Sent: 12/22/2024   3:56 PM CST  To: Isabel Maddox RN; Carola Angeles LPN  Subject: reschedule after imaging                         Pt seen 1 month ago, awaiting imaging. No reason to see her on Thursday without imaging.   It is ordered but not scheduled- can we call and get her Ultrasound scheduled? I do not necessarily need the MMG.   So I'd like to cancel her for this week and reschedule once imaging is performed.   Thank you

## 2024-12-24 ENCOUNTER — TELEPHONE (OUTPATIENT)
Dept: FAMILY MEDICINE | Facility: CLINIC | Age: 29
End: 2024-12-24
Payer: MEDICAID

## 2024-12-29 ENCOUNTER — HOSPITAL ENCOUNTER (EMERGENCY)
Facility: HOSPITAL | Age: 29
Discharge: HOME | End: 2024-12-29
Attending: INTERNAL MEDICINE
Payer: MEDICAID

## 2024-12-29 VITALS
HEIGHT: 64 IN | HEART RATE: 87 BPM | RESPIRATION RATE: 18 BRPM | BODY MASS INDEX: 50.02 KG/M2 | TEMPERATURE: 98 F | OXYGEN SATURATION: 97 % | WEIGHT: 293 LBS | DIASTOLIC BLOOD PRESSURE: 88 MMHG | SYSTOLIC BLOOD PRESSURE: 144 MMHG

## 2024-12-29 DIAGNOSIS — N61.1 BREAST ABSCESS: Primary | ICD-10-CM

## 2024-12-29 LAB
B-HCG UR QL: NEGATIVE
CTP QC/QA: YES

## 2024-12-29 PROCEDURE — 81025 URINE PREGNANCY TEST: CPT | Performed by: EMERGENCY MEDICINE

## 2024-12-29 PROCEDURE — 25000003 PHARM REV CODE 250

## 2024-12-29 PROCEDURE — 99283 EMERGENCY DEPT VISIT LOW MDM: CPT

## 2024-12-29 RX ORDER — ACETAMINOPHEN AND CODEINE PHOSPHATE 300; 30 MG/1; MG/1
1 TABLET ORAL EVERY 6 HOURS PRN
Qty: 12 TABLET | Refills: 0 | Status: SHIPPED | OUTPATIENT
Start: 2024-12-29 | End: 2025-01-01

## 2024-12-29 RX ORDER — SULFAMETHOXAZOLE AND TRIMETHOPRIM 800; 160 MG/1; MG/1
1 TABLET ORAL 2 TIMES DAILY
Qty: 14 TABLET | Refills: 0 | Status: SHIPPED | OUTPATIENT
Start: 2024-12-29 | End: 2025-01-05

## 2024-12-29 RX ORDER — HYDROCODONE BITARTRATE AND ACETAMINOPHEN 5; 325 MG/1; MG/1
1 TABLET ORAL
Status: COMPLETED | OUTPATIENT
Start: 2024-12-29 | End: 2024-12-29

## 2024-12-29 RX ADMIN — HYDROCODONE BITARTRATE AND ACETAMINOPHEN 1 TABLET: 5; 325 TABLET ORAL at 07:12

## 2024-12-29 NOTE — Clinical Note
"Esthela"Timoteo Maddox was seen and treated in our emergency department on 12/29/2024.  She may return to work on 12/30/2024.       If you have any questions or concerns, please don't hesitate to call.      Cam Greene MD"

## 2024-12-30 NOTE — ED PROVIDER NOTES
Encounter Date: 12/29/2024       History     Chief Complaint   Patient presents with    Breast Problem     Patient complaining of cysts to right breast that is super sensitive to touch. Per patient she was seen for the same problem a month ago but states that it has become more sensitive and has grown in size. Patient states that she was supposed to have a follow up visit but that her appointment got cancelled.     A 29 y.o. female patient with a history of HTN presents to the ED with breast tenderness/lump. The onset is 1 month ago, but has been worsening and gotten larger since then. Patient was seen in the ED and set up with follow-up but missed her appointment. Location is right breast. It is characterized as very tender/painful. Associated symptoms: none. It is constant. Alleviating factors: none. Aggravating factors: wearing a bra or not wearing a bra. Severity is Mild. Risk factors include none. Denies fever, chills, palpitations, SOB, CP, galactorrhea.       The history is provided by the patient.     Review of patient's allergies indicates:  No Known Allergies  Past Medical History:   Diagnosis Date    Abnormal Pap smear of cervix     Hypertension     Morbid obesity     SVT (supraventricular tachycardia)      Past Surgical History:   Procedure Laterality Date    NO PAST SURGERIES       Family History   Problem Relation Name Age of Onset    Diabetes Mother      Hypertension Mother      Diabetes Sister      Stroke Sister      Hypertension Sister       Social History     Tobacco Use    Smoking status: Former     Types: Cigarettes     Passive exposure: Never    Smokeless tobacco: Never   Substance Use Topics    Alcohol use: Yes     Comment: ocass    Drug use: Yes     Frequency: 2.0 times per week     Types: Marijuana     Review of Systems   Constitutional:  Negative for chills and fever.   Eyes:  Negative for visual disturbance.   Respiratory:  Negative for shortness of breath.    Cardiovascular:  Negative for  chest pain.   Gastrointestinal:  Negative for nausea and vomiting.   Genitourinary:  Negative for difficulty urinating and dysuria.   Musculoskeletal:  Negative for arthralgias.   Skin:  Negative for color change and rash.        Breast pain   Neurological:  Negative for weakness and headaches.   Hematological:  Does not bruise/bleed easily.   Psychiatric/Behavioral:  Negative for confusion.    All other systems reviewed and are negative.      Physical Exam     Initial Vitals [12/29/24 1749]   BP Pulse Resp Temp SpO2   (!) 138/92 90 17 98.1 °F (36.7 °C) 98 %      MAP       --         Physical Exam    Nursing note and vitals reviewed.  Constitutional: She appears well-developed and well-nourished.   HENT:   Head: Normocephalic and atraumatic.   Right Ear: External ear normal.   Left Ear: External ear normal.   Nose: Nose normal. Mouth/Throat: Oropharynx is clear and moist.   Eyes: Conjunctivae and EOM are normal.   Neck: Neck supple.   Cardiovascular:  Normal rate, regular rhythm and normal heart sounds.     Exam reveals no gallop and no friction rub.       No murmur heard.  Pulmonary/Chest: Breath sounds normal. No respiratory distress. She has no wheezes. She has no rhonchi. She has no rales.     Fluctuant cyst vs abscess noted to right medial breast. Approximately 3 cm by 3 cm. Very tender to palpation. No skin or temperature changes noted.    Musculoskeletal:      Cervical back: Neck supple.     Neurological: She is alert and oriented to person, place, and time. GCS score is 15. GCS eye subscore is 4. GCS verbal subscore is 5. GCS motor subscore is 6.   Skin: Skin is warm and dry. Capillary refill takes less than 2 seconds.         ED Course   Procedures  Labs Reviewed   POCT URINE PREGNANCY       Result Value    POC Preg Test, Ur Negative       Acceptable Yes            Imaging Results    None          Medications   HYDROcodone-acetaminophen 5-325 mg per tablet 1 tablet (1 tablet Oral Given  12/29/24 1950)     Medical Decision Making  A 29 y.o. female patient with a history of HTN presents to the ED with breast tenderness/lump. The onset is 1 month ago, but has been worsening and gotten larger since then. Patient was seen in the ED and set up with follow-up but missed her appointment. Location is right breast. It is characterized as very tender/painful. Associated symptoms: none. It is constant. Alleviating factors: none. Aggravating factors: wearing a bra or not wearing a bra. Severity is Mild. Risk factors include none. Denies fever, chills, palpitations, SOB, CP, galactorrhea.       The history is provided by the patient.     Pertinent findings: breast abscess vs cyst on right medial breast.     Clinical diagnosis:  Breast abscess (Primary)    Patient stable for DC with ED Prescriptions     Medication Sig Dispense Start Date End Date Auth. Provider    acetaminophen-codeine 300-30mg (TYLENOL #3) 300-30 mg Tab Take 1 tablet   by mouth every 6 (six) hours as needed (pain). 12 tablet 12/29/2024 1/1/2025 Sheila Palma PA    sulfamethoxazole-trimethoprim 800-160mg (BACTRIM DS) 800-160 mg Tab Take   1 tablet by mouth 2 (two) times daily. for 7 days 14 tablet 12/29/2024 1/5/2025 Sheila Palma PA         Referrals: Breast surgery clinic    Strict follow-up precautions given. Patient verbalizes understanding of treatment plan and ED return precautions.        Risk  Prescription drug management.  Risk Details: Given strict ED return precautions. I have spoken with the patient and/or caregivers. I have explained the patient's condition, diagnoses and treatment plan based on the information available to me at this time. I have answered the patient's and/or caregiver's questions and addressed any concerns. The patient and/or caregivers have as good an understanding of the patient's diagnosis, condition and treatment plan as can be expected at this point. The vital signs have been stable. The  patient's condition is stable and appropriate for discharge from the emergency department.      The patient will pursue further outpatient evaluation with the primary care physician or other designated or consulting physician as outlined in the discharge instructions. The patient and/or caregivers are agreeable to this plan of care and follow-up instructions have been explained in detail. The patient and/or caregivers have received these instructions in written format and have expressed an understanding of the discharge instructions. The patient and/or caregivers are aware that any significant change in condition or worsening of symptoms should prompt an immediate return to this or the closest emergency department or a call to 911               Additional MDM:   Differential Diagnosis:   Other: The following diagnoses were also considered and will be evaluated: breast abscess, cyst and malignancy.                                   Clinical Impression:  Final diagnoses:  [N61.1] Breast abscess (Primary)          ED Disposition Condition    Discharge Stable          ED Prescriptions       Medication Sig Dispense Start Date End Date Auth. Provider    acetaminophen-codeine 300-30mg (TYLENOL #3) 300-30 mg Tab Take 1 tablet by mouth every 6 (six) hours as needed (pain). 12 tablet 12/29/2024 1/1/2025 Sheila Palma PA    sulfamethoxazole-trimethoprim 800-160mg (BACTRIM DS) 800-160 mg Tab Take 1 tablet by mouth 2 (two) times daily. for 7 days 14 tablet 12/29/2024 1/5/2025 Sheila Palma PA          Follow-up Information       Follow up With Specialties Details Why Contact Info    [facility] Emergency Medicine Go to  If symptoms worsen, As needed [address] [facility] [address] [phone]             Sheila Palma PA  12/29/24 2028

## 2025-01-04 ENCOUNTER — OFFICE VISIT (OUTPATIENT)
Dept: URGENT CARE | Facility: CLINIC | Age: 30
End: 2025-01-04
Payer: MEDICAID

## 2025-01-04 VITALS
HEART RATE: 107 BPM | HEIGHT: 64 IN | OXYGEN SATURATION: 96 % | TEMPERATURE: 98 F | DIASTOLIC BLOOD PRESSURE: 80 MMHG | WEIGHT: 293 LBS | BODY MASS INDEX: 50.02 KG/M2 | SYSTOLIC BLOOD PRESSURE: 139 MMHG | RESPIRATION RATE: 18 BRPM

## 2025-01-04 DIAGNOSIS — K52.9 GASTROENTERITIS: Primary | ICD-10-CM

## 2025-01-04 LAB
B-HCG UR QL: NEGATIVE
CTP QC/QA: YES

## 2025-01-04 PROCEDURE — 99214 OFFICE O/P EST MOD 30 MIN: CPT | Mod: S$PBB,,, | Performed by: FAMILY MEDICINE

## 2025-01-04 PROCEDURE — 99213 OFFICE O/P EST LOW 20 MIN: CPT | Mod: PBBFAC | Performed by: FAMILY MEDICINE

## 2025-01-04 PROCEDURE — 81025 URINE PREGNANCY TEST: CPT | Mod: PBBFAC | Performed by: FAMILY MEDICINE

## 2025-01-04 RX ORDER — DICYCLOMINE HYDROCHLORIDE 20 MG/1
20 TABLET ORAL EVERY 6 HOURS
Qty: 120 TABLET | Refills: 0 | Status: SHIPPED | OUTPATIENT
Start: 2025-01-04 | End: 2025-02-03

## 2025-01-04 RX ORDER — ONDANSETRON 8 MG/1
8 TABLET, ORALLY DISINTEGRATING ORAL 3 TIMES DAILY PRN
Qty: 20 TABLET | Refills: 1 | Status: SHIPPED | OUTPATIENT
Start: 2025-01-04

## 2025-01-04 NOTE — PROGRESS NOTES
"Subjective:       Patient ID: Esthela Maddox is a 29 y.o. female.    Chief Complaint: Diarrhea (Pt c/o nausea, diarrhea, abd. Cramping  x this morning /Pt states she had [employer] seafood last night )      Diarrhea       29-year-old female with nausea, abdominal cramping, and diarrhea since this morning.  She ate at a seafood restaurant last night.  No one else is sick in the house.  She also reports irregular menses.  I encouraged the patient to seek further assistance from her gynecologist.  Review of Systems   Gastrointestinal:  Positive for diarrhea and nausea.         Objective:       Vital Signs  Temp: 98.1 °F (36.7 °C)  Pulse: 107  Resp: 18  SpO2: 96 %  BP: 139/80  Patient Position: Sitting  Height and Weight  Height: 5' 4" (162.6 cm)  Weight: (!) 155.6 kg (343 lb)  BSA (Calculated - sq m): 2.65 sq meters  BMI (Calculated): 58.8  Weight in (lb) to have BMI = 25: 145.3]  Physical Exam  Vitals reviewed.   Constitutional:       Appearance: Normal appearance.   HENT:      Head: Normocephalic and atraumatic.   Eyes:      Extraocular Movements: Extraocular movements intact.      Conjunctiva/sclera: Conjunctivae normal.   Cardiovascular:      Rate and Rhythm: Normal rate and regular rhythm.      Heart sounds: Normal heart sounds.   Pulmonary:      Breath sounds: Normal breath sounds.   Skin:     General: Skin is warm and dry.   Neurological:      General: No focal deficit present.      Mental Status: She is alert.   Psychiatric:         Mood and Affect: Mood and affect normal.         Speech: Speech normal.         Behavior: Behavior normal. Behavior is cooperative.         Thought Content: Thought content does not include homicidal or suicidal ideation.         Assessment:       Problem List Items Addressed This Visit    None  Visit Diagnoses       Gastroenteritis    -  Primary    Relevant Medications    ondansetron (ZOFRAN-ODT) 8 MG TbDL    dicyclomine (BENTYL) 20 mg tablet    Other Relevant Orders    POCT " urine pregnancy (Completed)            Plan:   Encouraged bland diet and increase fluids for the next few days  ER precautions  FU with PCP

## 2025-01-13 ENCOUNTER — TELEPHONE (OUTPATIENT)
Dept: GYNECOLOGY | Facility: CLINIC | Age: 30
End: 2025-01-13
Payer: MEDICAID

## 2025-01-14 NOTE — TELEPHONE ENCOUNTER
Patient currently undergoing pre-op process for CKC. She requires cardiac risk stratification for her  h/o SVT and HTN. Pt no showed cardiology appt on 1/9.    Called patient to discuss importance of cardiac risk stratification and obtaining it prior to proceeding with CKC. Patient verbalized understanding, she will call and reschedule cardiology appointment.     Joanne Ricardo MD  [employer] PGY-3  01/13/2025 7:47 PM

## 2025-01-16 ENCOUNTER — TELEPHONE (OUTPATIENT)
Dept: GYNECOLOGY | Facility: CLINIC | Age: 30
End: 2025-01-16
Payer: MEDICAID

## 2025-01-16 NOTE — TELEPHONE ENCOUNTER
Cards appt 2/4/25        ----- Message from Usha sent at 1/14/2025  4:00 PM CST -----  Patient is rescheduled for 02/05/24.    Thank you,  Usha  ----- Message -----  From: Lakia Alaniz LPN  Sent: 12/13/2024   3:38 PM CST  To: Pao Henao LPN; Usha Morillo; #    Thank you Usha! Appreciate it  ----- Message -----  From: Usha Morillo  Sent: 12/11/2024  12:54 PM CST  To: Pao Henao LPN; Lakia Alaniz LPN; #    Good afternoon,    Patient is scheduled for 01/09/24 @ 8am for cardiac clearance.    Thank you,  Usha Holley afternoon!      Patient is needing to undergo a CKC with us. However she has had recent hospital admissions for chest pain and shortness of breath. She has not seen a cardiologist yet. Can patient get established with cardio and also get clearance from ya?      Please advise. Thank you!          LB    12/10/24  2:06 PM  Lakia Alaniz LPN routed this conversation to [facility] Cardiology Clinical Support Staff · Me · Pao Henao LPN Jagneaux, Misty, LPN to Me · Lakia Alaniz LPN       12/10/24  2:11 PM   Please send referral  Lakia Alaniz LPN to Carmencita Mckinnon LPN · [facility]    12/10/24  3:37 PM   Good afternoon,    Referral is in her chart. It was placed on 11/26/24.

## 2025-01-20 DIAGNOSIS — I10 HYPERTENSION, UNSPECIFIED TYPE: ICD-10-CM

## 2025-01-24 RX ORDER — METOPROLOL TARTRATE 25 MG/1
25 TABLET, FILM COATED ORAL 2 TIMES DAILY
Qty: 60 TABLET | Refills: 0 | Status: SHIPPED | OUTPATIENT
Start: 2025-01-24 | End: 2026-01-24

## 2025-01-24 RX ORDER — ASPIRIN 81 MG/1
81 TABLET ORAL DAILY
Qty: 30 TABLET | Refills: 0 | Status: SHIPPED | OUTPATIENT
Start: 2025-01-24

## 2025-02-03 NOTE — PROGRESS NOTES
CHIEF COMPLAINT: No chief complaint on file.                                                 HPI:  Esthela Maddox 29 y.o. female with a past medical history obesity, SVT, chest pain, depression, syphilis, hemorrhoids, abnormal uterine bleeding presents to Cardiology Clinic today to establish care.  She was referred here per gyn Clinic for cardiac risk assessment for upcoming procedure.  Today the patient states that she feels fairly well overall, but she does endorse several cardiac complaints today.  She states that she often times has chest pain.  She describes his pain as a tightness.  It occurs both at rest and with exertion.  She states it occurs want to regular days basis.  She has a difficult time describing it other than that is a tightness.  She has endorses orthopnea and shortness a breath with moving around.  She states that she has dyspnea with mild levels of activity including ADLs.  She states that she is not able to walk to the parking lot or up a flight of stairs without experiencing ALTMAN.  She reports occasional episodes where she feels as though her heart is racing.  Per chart review, her heart rate typically States in the 90s to 100s.  She does have a past medical history of but states that she has experienced any significant episodes other than the initial episode.  She currently takes metoprolol daily.  She has a history of sleep apnea but states that she does not wear her CPAP.  She denies any lightheadedness, dizziness, or other lower extremity edema or claudication symptoms.  She is able to complete her ADLs but she does frequently notice chest pain and SOB/ALTMAN.  She is minimally physically active and does not do any formal exercise or physical activity.  She reports compliance with her current medications and she states that she is tolerating them well.  She denies any tobacco or alcohol use, does but does admit to using marijuana daily.                                                                                                                                                                                                                                                                                                                                                                                                                                                                                        CARDIAC TESTING:  Results for orders placed during the hospital encounter of 06/30/22    Echo Saline Bubble? No    Interpretation Summary  · The left ventricle is normal in size with normal systolic function.  · Normal left ventricular diastolic function.  · The estimated ejection fraction is 60%.  · Normal right ventricular size with normal right ventricular systolic function.  · No significant valvular abnormalities noted in this study.    No results found for this or any previous visit.     No results found for this or any previous visit.       Patient Active Problem List   Diagnosis    Elevated blood-pressure reading, without diagnosis of hypertension    Hemorrhoids, external    Morbid obesity    Syphilis    SVT (supraventricular tachycardia)    Chest pain    Encounter to establish care    Hypertension    Depression    Vitamin D deficiency    Anxiety    Cervical dysplasia    Abnormal uterine bleeding (AUB)    PCOS (polycystic ovarian syndrome)     Past Surgical History:   Procedure Laterality Date    NO PAST SURGERIES       Social History     Socioeconomic History    Marital status: Significant Other   Occupational History     Comment: employed   Tobacco Use    Smoking status: Former     Types: Cigarettes     Passive exposure: Never    Smokeless tobacco: Never   Substance and Sexual Activity    Alcohol use: Yes     Comment: ocass    Drug use: Yes     Frequency: 2.0 times per week     Types: Marijuana    Sexual activity: Yes     Partners: Female     Birth control/protection: None        Family History    Problem Relation Name Age of Onset    Diabetes Mother      Hypertension Mother      Diabetes Sister      Stroke Sister      Hypertension Sister       Review of patient's allergies indicates:  No Known Allergies      ROS:  Review of Systems   Constitutional: Negative.  Negative for malaise/fatigue.   HENT: Negative.     Eyes: Negative.    Respiratory:  Positive for shortness of breath.    Cardiovascular:  Positive for chest pain, palpitations and orthopnea. Negative for claudication, leg swelling and PND.   Gastrointestinal: Negative.    Genitourinary: Negative.    Musculoskeletal: Negative.    Skin: Negative.    Neurological: Negative.    Endo/Heme/Allergies: Negative.    Psychiatric/Behavioral: Negative.                                                                                                                                                                                  Negative except as stated in the history of present illness. See HPI for details.    PHYSICAL EXAM:  There were no vitals taken for this visit.    Physical Exam  Vitals reviewed.   Constitutional:       Appearance: Normal appearance. She is obese. She is not ill-appearing.   HENT:      Head: Normocephalic.      Nose: Nose normal.   Eyes:      Extraocular Movements: Extraocular movements intact.   Neck:      Vascular: No carotid bruit.   Cardiovascular:      Rate and Rhythm: Regular rhythm. Tachycardia present.      Pulses: Normal pulses.      Heart sounds: No murmur heard.  Pulmonary:      Effort: Pulmonary effort is normal.      Breath sounds: Normal breath sounds.   Abdominal:      General: There is no distension.   Musculoskeletal:         General: Normal range of motion.      Cervical back: Normal range of motion.      Right lower leg: No edema.      Left lower leg: No edema.   Skin:     General: Skin is warm.   Neurological:      General: No focal deficit present.      Mental Status: She is alert.   Psychiatric:         Mood and  Affect: Mood normal.         Current Outpatient Medications   Medication Instructions    aspirin (ECOTRIN) 81 mg, Oral, Daily    dicyclomine (BENTYL) 20 mg, Oral, Every 6 hours    metoprolol tartrate (LOPRESSOR) 25 mg, Oral, 2 times daily    ondansetron (ZOFRAN-ODT) 8 mg, Oral, 3 times daily PRN    SLYND 4 mg, Oral, Daily        All medications, laboratory studies, cardiac diagnostic imaging reviewed.     Lab Results   Component Value Date    .00 02/01/2023    LDL 95.00 07/01/2022    TRIG 109 02/01/2023    TRIG 123 07/01/2022    CREATININE 0.80 11/03/2024    MG 2.10 11/03/2024    K 3.7 11/03/2024        ASSESSMENT/PLAN:    Cardiac Risk Assessment  - Patient states that she cannot perform 4 METS without experiencing cardiac symptoms  - Will have patient complete Dobutamine Stress Echo to assess for any underlying ischemic causes of her current symptoms of chest pain, SOB/ALTMAN.  We will proceed with a dobutamine stress echo as patient exceeds weight limit for other ischemic testing.  - Will have patient complete echocardiogram to assess for underlying heart failure, valve disease, end-stage structural disease that may be contributing to her symptoms of chest pain, SOB/ALTMAN  - Once testing is completed, we will be able to provide an accurate risk assessment    Chest Pain  SOB/ALTMAN  - See HPI for description of symptoms   - Has had several ED visits in past for CP   - Reports that it is a tightness in chest that occurs at rest and with exertion  - Reports SOB/ALTMAN with nearly all levels of exertion  - Reports orthopnea  - Will have patient complete dobutamine stress test to assess for any underlying ischemic causes of SOB/ALTMAN and chest pain-she is unable to complete other forms of ischemic testing due to weight limit   - Will have patient complete echocardiogram to assess for any underlying heart failure, valvular disease, or structural disease that may be contributing to her symptoms    Morbid Obesity  - BMI 72.09  kg/m2  - Recommend weight loss    History of SVT  - Occasional palpitations, but denies any recent SVT episodes to her knowledge  - HR 90s today   - Currently on Metoprolol Tartrate 25 MG BID, will increase to 50 MG BID  - Return to clinic for NV for HR/symptoms check    Marijuana Use  - Reports daily use  - Counseled on the importance of cessation       Complete echocardiogram  Complete stress echo   Increase metoprolol tartrate to 50 mg twice daily  Follow up in cardiology clinic in 3 months or sooner if needed   Follow up with PCP as directed   Please notify clinic if any new concerns or any change in symptoms

## 2025-02-04 ENCOUNTER — OFFICE VISIT (OUTPATIENT)
Dept: CARDIOLOGY | Facility: CLINIC | Age: 30
End: 2025-02-04
Payer: MEDICAID

## 2025-02-04 VITALS
BODY MASS INDEX: 50.02 KG/M2 | TEMPERATURE: 98 F | HEART RATE: 96 BPM | RESPIRATION RATE: 18 BRPM | SYSTOLIC BLOOD PRESSURE: 110 MMHG | WEIGHT: 293 LBS | DIASTOLIC BLOOD PRESSURE: 52 MMHG | HEIGHT: 64 IN | OXYGEN SATURATION: 97 %

## 2025-02-04 DIAGNOSIS — R06.02 SHORTNESS OF BREATH: ICD-10-CM

## 2025-02-04 DIAGNOSIS — I10 HYPERTENSION, UNSPECIFIED TYPE: ICD-10-CM

## 2025-02-04 DIAGNOSIS — R03.0 ELEVATED BLOOD-PRESSURE READING, WITHOUT DIAGNOSIS OF HYPERTENSION: ICD-10-CM

## 2025-02-04 DIAGNOSIS — I47.10 SVT (SUPRAVENTRICULAR TACHYCARDIA): ICD-10-CM

## 2025-02-04 DIAGNOSIS — Z01.810 ENCOUNTER FOR PRE-OPERATIVE CARDIOVASCULAR CLEARANCE: Primary | ICD-10-CM

## 2025-02-04 DIAGNOSIS — R07.9 CHEST PAIN, UNSPECIFIED TYPE: ICD-10-CM

## 2025-02-04 PROCEDURE — 1159F MED LIST DOCD IN RCRD: CPT | Mod: CPTII,,,

## 2025-02-04 PROCEDURE — 3074F SYST BP LT 130 MM HG: CPT | Mod: CPTII,,,

## 2025-02-04 PROCEDURE — 3008F BODY MASS INDEX DOCD: CPT | Mod: CPTII,,,

## 2025-02-04 PROCEDURE — 99215 OFFICE O/P EST HI 40 MIN: CPT | Mod: PBBFAC

## 2025-02-04 PROCEDURE — 1160F RVW MEDS BY RX/DR IN RCRD: CPT | Mod: CPTII,,,

## 2025-02-04 PROCEDURE — 99204 OFFICE O/P NEW MOD 45 MIN: CPT | Mod: S$PBB,,,

## 2025-02-04 PROCEDURE — 3078F DIAST BP <80 MM HG: CPT | Mod: CPTII,,,

## 2025-02-04 RX ORDER — METOPROLOL TARTRATE 50 MG/1
50 TABLET ORAL 2 TIMES DAILY
Qty: 180 TABLET | Refills: 3 | Status: SHIPPED | OUTPATIENT
Start: 2025-02-04 | End: 2026-02-04

## 2025-02-04 NOTE — PATIENT INSTRUCTIONS
Complete echocardiogram  Complete stress echo   Increase metoprolol tartrate to 50 mg twice daily  Follow up in cardiology clinic in 3 months or sooner if needed   Follow up with PCP as directed   Please notify clinic if any new concerns or any change in symptoms

## 2025-02-18 DIAGNOSIS — I10 HYPERTENSION, UNSPECIFIED TYPE: ICD-10-CM

## 2025-02-19 RX ORDER — ASPIRIN 81 MG/1
81 TABLET ORAL DAILY
Qty: 30 TABLET | Refills: 3 | Status: SHIPPED | OUTPATIENT
Start: 2025-02-19

## 2025-02-20 ENCOUNTER — HOSPITAL ENCOUNTER (EMERGENCY)
Facility: HOSPITAL | Age: 30
Discharge: HOME | End: 2025-02-20
Attending: INTERNAL MEDICINE
Payer: MEDICAID

## 2025-02-20 VITALS
DIASTOLIC BLOOD PRESSURE: 85 MMHG | SYSTOLIC BLOOD PRESSURE: 119 MMHG | BODY MASS INDEX: 67.97 KG/M2 | TEMPERATURE: 98 F | RESPIRATION RATE: 18 BRPM | WEIGHT: 293 LBS | HEART RATE: 82 BPM | OXYGEN SATURATION: 100 %

## 2025-02-20 DIAGNOSIS — R07.89 OTHER CHEST PAIN: ICD-10-CM

## 2025-02-20 DIAGNOSIS — R00.2 INTERMITTENT PALPITATIONS: Primary | ICD-10-CM

## 2025-02-20 DIAGNOSIS — R07.9 CHEST PAIN: ICD-10-CM

## 2025-02-20 LAB
ALBUMIN SERPL-MCNC: 3.3 G/DL (ref 3.5–5)
ALBUMIN/GLOB SERPL: 0.8 RATIO (ref 1.1–2)
ALP SERPL-CCNC: 81 UNIT/L (ref 40–150)
ALT SERPL-CCNC: 38 UNIT/L (ref 0–55)
ANION GAP SERPL CALC-SCNC: 3 MEQ/L
AST SERPL-CCNC: 26 UNIT/L (ref 5–34)
BASOPHILS # BLD AUTO: 0.04 X10(3)/MCL
BASOPHILS NFR BLD AUTO: 0.6 %
BILIRUB SERPL-MCNC: 0.3 MG/DL
BUN SERPL-MCNC: 9.4 MG/DL (ref 7–18.7)
CALCIUM SERPL-MCNC: 9.3 MG/DL (ref 8.4–10.2)
CHLORIDE SERPL-SCNC: 107 MMOL/L (ref 98–107)
CO2 SERPL-SCNC: 28 MMOL/L (ref 22–29)
CREAT SERPL-MCNC: 0.62 MG/DL (ref 0.55–1.02)
CREAT/UREA NIT SERPL: 15
EOSINOPHIL # BLD AUTO: 0.11 X10(3)/MCL (ref 0–0.9)
EOSINOPHIL NFR BLD AUTO: 1.6 %
ERYTHROCYTE [DISTWIDTH] IN BLOOD BY AUTOMATED COUNT: 12.7 % (ref 11.5–17)
GFR SERPLBLD CREATININE-BSD FMLA CKD-EPI: >60 ML/MIN/1.73/M2
GLOBULIN SER-MCNC: 4.1 GM/DL (ref 2.4–3.5)
GLUCOSE SERPL-MCNC: 98 MG/DL (ref 74–100)
HCT VFR BLD AUTO: 40.6 % (ref 37–47)
HGB BLD-MCNC: 13.3 G/DL (ref 12–16)
HOLD SPECIMEN: NORMAL
IMM GRANULOCYTES # BLD AUTO: 0.01 X10(3)/MCL (ref 0–0.04)
IMM GRANULOCYTES NFR BLD AUTO: 0.1 %
LYMPHOCYTES # BLD AUTO: 2.2 X10(3)/MCL (ref 0.6–4.6)
LYMPHOCYTES NFR BLD AUTO: 31.4 %
MCH RBC QN AUTO: 28.6 PG (ref 27–31)
MCHC RBC AUTO-ENTMCNC: 32.8 G/DL (ref 33–36)
MCV RBC AUTO: 87.3 FL (ref 80–94)
MONOCYTES # BLD AUTO: 0.56 X10(3)/MCL (ref 0.1–1.3)
MONOCYTES NFR BLD AUTO: 8 %
NEUTROPHILS # BLD AUTO: 4.08 X10(3)/MCL (ref 2.1–9.2)
NEUTROPHILS NFR BLD AUTO: 58.3 %
NRBC BLD AUTO-RTO: 0 %
OHS QRS DURATION: 82 MS
OHS QTC CALCULATION: 430 MS
PLATELET # BLD AUTO: 288 X10(3)/MCL (ref 130–400)
PMV BLD AUTO: 10.7 FL (ref 7.4–10.4)
POTASSIUM SERPL-SCNC: 4.1 MMOL/L (ref 3.5–5.1)
PROT SERPL-MCNC: 7.4 GM/DL (ref 6.4–8.3)
RBC # BLD AUTO: 4.65 X10(6)/MCL (ref 4.2–5.4)
SODIUM SERPL-SCNC: 138 MMOL/L (ref 136–145)
TROPONIN I SERPL-MCNC: <0.01 NG/ML (ref 0–0.04)
WBC # BLD AUTO: 7 X10(3)/MCL (ref 4.5–11.5)

## 2025-02-20 PROCEDURE — 85025 COMPLETE CBC W/AUTO DIFF WBC: CPT | Performed by: INTERNAL MEDICINE

## 2025-02-20 PROCEDURE — 99285 EMERGENCY DEPT VISIT HI MDM: CPT | Mod: 25

## 2025-02-20 PROCEDURE — 84484 ASSAY OF TROPONIN QUANT: CPT | Performed by: INTERNAL MEDICINE

## 2025-02-20 PROCEDURE — 80053 COMPREHEN METABOLIC PANEL: CPT | Performed by: INTERNAL MEDICINE

## 2025-02-20 PROCEDURE — 93005 ELECTROCARDIOGRAM TRACING: CPT

## 2025-02-20 RX ORDER — ACETAMINOPHEN 500 MG
1000 TABLET ORAL 3 TIMES DAILY PRN
Start: 2025-02-20

## 2025-02-20 RX ORDER — IBUPROFEN 200 MG
400 TABLET ORAL EVERY 6 HOURS PRN
Start: 2025-02-20

## 2025-02-20 NOTE — DISCHARGE INSTRUCTIONS
Continue your medications.  Avoid smoking.  Keep your appointment for your cardiology procedures as scheduled.  Return for any other problems

## 2025-02-20 NOTE — ED NOTES
"Patient reports to the ed c/o "panic attack" with chest pain and sob that started approximately 40 minutes ago. Currently takes no meds for anxiety. Rates pain 9/10 at this time. Vss. 12 lead EKG obtained upon arrival.  "

## 2025-02-20 NOTE — ED PROVIDER NOTES
"Encounter Date: 2/20/2025       History     Chief Complaint   Patient presents with    Chest Pain    Anxiety     PT IN /[facility], REPORT OF ANXIETY W CP/SOB/TINGLING IN FINGERS AND HA PTA.   CBG EN ROUTE 101.  EKG OBTAINED.      29-year-old black female has a history of chronic almost daily chest pain with chronic shortness of breath.  She has a history of SVT.  She states today she had a "panic attack" at lasted about 20 or 30 minutes and then spontaneously resolved.  With a rising and she had some vague right-sided chest pain.  She is on metoprolol 50 mg 3 times a day, was recently seen in Cardiology Clinic, for chronic shortness of breath, dyspnea on exertion and chronic chest pain.  She is scheduled for a dobutamine stress echo in the next few weeks.  She states she quit smoking about a month ago.  Other illness, no recent hospitalizations, no fever, chills, cough, cold.    The history is provided by the patient.     Review of patient's allergies indicates:  No Known Allergies  Past Medical History:   Diagnosis Date    Abnormal Pap smear of cervix     Hypertension     Morbid obesity     SVT (supraventricular tachycardia)      Past Surgical History:   Procedure Laterality Date    NO PAST SURGERIES       Family History   Problem Relation Name Age of Onset    Diabetes Mother      Hypertension Mother      Diabetes Sister      Stroke Sister      Hypertension Sister       Social History[1]  Review of Systems   Constitutional: Negative.  Negative for fever.   HENT: Negative.     Eyes: Negative.    Respiratory:  Positive for chest tightness and shortness of breath.    Cardiovascular:  Positive for chest pain.   Gastrointestinal: Negative.    Genitourinary: Negative.    Musculoskeletal: Negative.    Neurological: Negative.    Psychiatric/Behavioral: Negative.         Physical Exam     Initial Vitals [02/20/25 1218]   BP Pulse Resp Temp SpO2   (!) 153/91 85 18 97.9 °F (36.6 °C) 99 %      MAP       --         Physical " Exam    Nursing note and vitals reviewed.  Constitutional:   Patient is massively obese in no distress    HENT:   Head: Normocephalic and atraumatic.   Eyes: EOM are normal. Pupils are equal, round, and reactive to light.   Neck: Neck supple.   Normal range of motion.  Cardiovascular:  Normal rate, regular rhythm and normal heart sounds.           Pulmonary/Chest: Breath sounds normal. She exhibits no tenderness.   Abdominal: Abdomen is soft. Bowel sounds are normal. There is no abdominal tenderness.   Musculoskeletal:         General: No edema. Normal range of motion.      Cervical back: Normal range of motion and neck supple.     Neurological: She is alert and oriented to person, place, and time. She has normal strength.   Skin: Skin is warm and dry.         ED Course   Procedures  Labs Reviewed   COMPREHENSIVE METABOLIC PANEL - Abnormal       Result Value    Sodium 138      Potassium 4.1      Chloride 107      CO2 28      Glucose 98      Blood Urea Nitrogen 9.4      Creatinine 0.62      Calcium 9.3      Protein Total 7.4      Albumin 3.3 (*)     Globulin 4.1 (*)     Albumin/Globulin Ratio 0.8 (*)     Bilirubin Total 0.3      ALP 81      ALT 38      AST 26      eGFR >60      Anion Gap 3.0      BUN/Creatinine Ratio 15     CBC WITH DIFFERENTIAL - Abnormal    WBC 7.00      RBC 4.65      Hgb 13.3      Hct 40.6      MCV 87.3      MCH 28.6      MCHC 32.8 (*)     RDW 12.7      Platelet 288      MPV 10.7 (*)     Neut % 58.3      Lymph % 31.4      Mono % 8.0      Eos % 1.6      Basophil % 0.6      Imm Grans % 0.1      Neut # 4.08      Lymph # 2.20      Mono # 0.56      Eos # 0.11      Baso # 0.04      Imm Gran # 0.01      NRBC% 0.0     TROPONIN I - Normal    Troponin-I <0.010     CBC W/ AUTO DIFFERENTIAL    Narrative:     The following orders were created for panel order CBC Auto Differential.  Procedure                               Abnormality         Status                     ---------                                -----------         ------                     CBC with Differential[[id number]]       Abnormal            Final result                 Please view results for these tests on the individual orders.   EXTRA TUBES    Narrative:     The following orders were created for panel order EXTRA TUBES.  Procedure                               Abnormality         Status                     ---------                               -----------         ------                     Light Blue Top Hold[[id number]]                             In process                 Lavender Top Hold[[id number]]                               In process                 Gold Top Hold[[id number]]                                   In process                   Please view results for these tests on the individual orders.   LIGHT BLUE TOP HOLD   LAVENDER TOP HOLD   GOLD TOP HOLD     EKG Readings: (Independently Interpreted)   Normal sinus rhythm, normal axis, normal intervals     ECG Results              EKG 12-lead (Chest Pain) Age >30 (In process)        Collection Time Result Time QRS Duration OHS QTC Calculation    02/20/25 12:20:13 02/20/25 12:23:18 82 430                     In process by Interface, Lab In [facility] (02/20/25 12:23:24)                   Narrative:    Test Reason : R07.9,    Vent. Rate :  86 BPM     Atrial Rate :  86 BPM     P-R Int : 156 ms          QRS Dur :  82 ms      QT Int : 360 ms       P-R-T Axes :  17  56  58 degrees    QTcB Int : 430 ms    Normal sinus rhythm  Cannot rule out Anterior infarct ,age undetermined  Abnormal ECG  When compared with ECG of 27-Aug-2024 09:46,  No significant change was found    Referred By:            Confirmed By:                                   Imaging Results              X-Ray Chest PA And Lateral (Final result)  Result time 02/20/25 13:59:24      Final result by Aaron Rouse MD (02/20/25 13:59:24)                   Impression:      No acute chest disease is  identified.      Electronically signed by: Aaron Rouse  Date:    02/20/2025  Time:    13:59               Narrative:    EXAMINATION:  XR CHEST PA AND LATERAL    CLINICAL HISTORY:  , Chest pain, unspecified.    COMPARISON:  August 27, 2024    FINDINGS:  No alveolar consolidation, effusion, or pneumothorax is seen.   The thoracic aorta is normal  cardiac silhouette, central pulmonary vessels and mediastinum are normal in size and are grossly unremarkable.   visualized osseous structures are grossly unremarkable.                                    X-Rays:   Independently Interpreted Readings:   Other Readings:  Chest x-ray was independently reviewed by me, there are no acute changes noted.    Medications - No data to display  Medical Decision Making  Patient's cardiac workup was negative, she has had no arrhythmias since arrival, her EKG is unchanged from prior.  She is scheduled for a dobutamine stress echo in the next 1-2 weeks.  She states she has frequent episodes of similar chest pain.  I see no sign of acute coronary syndrome at this time.  Impression and this includes possible panic attack, intermittent palpitations.  She is stable for discharge    Amount and/or Complexity of Data Reviewed  External Data Reviewed: ECG and notes.  Labs: ordered. Decision-making details documented in ED Course.  Radiology: ordered. Decision-making details documented in ED Course.  ECG/medicine tests:  Decision-making details documented in ED Course.      Additional MDM:   Differential Diagnosis:   Other: The following diagnoses were also considered and will be evaluated: SVT, Panic attack.                                   Clinical Impression:  Final diagnoses:  [R07.9] Chest pain  [R00.2] Intermittent palpitations (Primary)                   [1]   Social History  Tobacco Use    Smoking status: Former     Types: Cigarettes     Passive exposure: Never    Smokeless tobacco: Never   Substance Use Topics    Alcohol use: Yes      Comment: ocass    Drug use: Not Currently     Frequency: 2.0 times per week     Types: Marijuana        Steven Mcpherson MD  02/20/25 [id number]

## 2025-02-21 ENCOUNTER — PATIENT OUTREACH (OUTPATIENT)
Facility: OTHER | Age: 30
End: 2025-02-21
Payer: MEDICAID

## 2025-03-09 ENCOUNTER — HOSPITAL ENCOUNTER (EMERGENCY)
Facility: HOSPITAL | Age: 30
Discharge: HOME | End: 2025-03-09
Attending: INTERNAL MEDICINE
Payer: MEDICAID

## 2025-03-09 VITALS
WEIGHT: 293 LBS | SYSTOLIC BLOOD PRESSURE: 147 MMHG | OXYGEN SATURATION: 98 % | DIASTOLIC BLOOD PRESSURE: 87 MMHG | RESPIRATION RATE: 18 BRPM | BODY MASS INDEX: 47.09 KG/M2 | TEMPERATURE: 98 F | HEART RATE: 105 BPM | HEIGHT: 66 IN

## 2025-03-09 DIAGNOSIS — A08.4 VIRAL GASTROENTERITIS: Primary | ICD-10-CM

## 2025-03-09 LAB
B-HCG UR QL: NEGATIVE
CTP QC/QA: YES

## 2025-03-09 PROCEDURE — 96374 THER/PROPH/DIAG INJ IV PUSH: CPT

## 2025-03-09 PROCEDURE — 96372 THER/PROPH/DIAG INJ SC/IM: CPT

## 2025-03-09 PROCEDURE — 99284 EMERGENCY DEPT VISIT MOD MDM: CPT | Mod: 25

## 2025-03-09 PROCEDURE — 25000003 PHARM REV CODE 250

## 2025-03-09 PROCEDURE — 96361 HYDRATE IV INFUSION ADD-ON: CPT

## 2025-03-09 PROCEDURE — 81025 URINE PREGNANCY TEST: CPT

## 2025-03-09 PROCEDURE — 63600175 PHARM REV CODE 636 W HCPCS

## 2025-03-09 RX ORDER — DICYCLOMINE HYDROCHLORIDE 20 MG/1
20 TABLET ORAL 2 TIMES DAILY
Qty: 20 TABLET | Refills: 0 | Status: SHIPPED | OUTPATIENT
Start: 2025-03-09 | End: 2025-04-08

## 2025-03-09 RX ORDER — DICYCLOMINE HYDROCHLORIDE 10 MG/ML
20 INJECTION INTRAMUSCULAR ONCE
Status: COMPLETED | OUTPATIENT
Start: 2025-03-09 | End: 2025-03-09

## 2025-03-09 RX ORDER — ONDANSETRON 4 MG/1
4 TABLET, ORALLY DISINTEGRATING ORAL
Status: COMPLETED | OUTPATIENT
Start: 2025-03-09 | End: 2025-03-09

## 2025-03-09 RX ORDER — ONDANSETRON 4 MG/1
4 TABLET, FILM COATED ORAL EVERY 6 HOURS
Qty: 12 TABLET | Refills: 0 | Status: SHIPPED | OUTPATIENT
Start: 2025-03-09

## 2025-03-09 RX ORDER — DIPHENHYDRAMINE HYDROCHLORIDE 50 MG/ML
25 INJECTION, SOLUTION INTRAMUSCULAR; INTRAVENOUS
Status: COMPLETED | OUTPATIENT
Start: 2025-03-09 | End: 2025-03-09

## 2025-03-09 RX ORDER — ACETAMINOPHEN 325 MG/1
650 TABLET ORAL
Status: COMPLETED | OUTPATIENT
Start: 2025-03-09 | End: 2025-03-09

## 2025-03-09 RX ADMIN — ONDANSETRON 4 MG: 4 TABLET, ORALLY DISINTEGRATING ORAL at 01:03

## 2025-03-09 RX ADMIN — ACETAMINOPHEN 650 MG: 325 TABLET, FILM COATED ORAL at 03:03

## 2025-03-09 RX ADMIN — SODIUM CHLORIDE, POTASSIUM CHLORIDE, SODIUM LACTATE AND CALCIUM CHLORIDE 1000 ML: 600; 310; 30; 20 INJECTION, SOLUTION INTRAVENOUS at 03:03

## 2025-03-09 RX ADMIN — DIPHENHYDRAMINE HYDROCHLORIDE 25 MG: 50 INJECTION INTRAMUSCULAR; INTRAVENOUS at 03:03

## 2025-03-09 RX ADMIN — DICYCLOMINE HYDROCHLORIDE 20 MG: 10 INJECTION, SOLUTION INTRAMUSCULAR at 01:03

## 2025-03-09 NOTE — DISCHARGE INSTRUCTIONS
Clear liquid diet for 24-48 hours then bland diet advanced as tolerated  Use commercial electrolyte solution for oral hydration

## 2025-03-09 NOTE — ED PROVIDER NOTES
Encounter Date: 3/9/2025       History     Chief Complaint   Patient presents with    Nausea    Vomiting    Diarrhea     Pt reports nausea, vomiting, and diarrhea that started this morning at 3 am. No other problems at this time.      29-year-old female presenting to the emergency department with complaints of nausea, vomiting, diarrhea since 3:00 a.m..  She endorses weakness.  She denies fever, chills, dysuria, vaginal discharge, abdominal pain, constipation, lethargy, chest pain, diaphoresis, shortness of breath.     The history is provided by the patient.     Review of patient's allergies indicates:  No Known Allergies  Past Medical History:   Diagnosis Date    Abnormal Pap smear of cervix     Hypertension     Morbid obesity     SVT (supraventricular tachycardia)      Past Surgical History:   Procedure Laterality Date    NO PAST SURGERIES       Family History   Problem Relation Name Age of Onset    Diabetes Mother      Hypertension Mother      Diabetes Sister      Stroke Sister      Hypertension Sister       Social History[1]  Review of Systems   Constitutional: Negative.    HENT: Negative.     Eyes: Negative.    Respiratory: Negative.     Cardiovascular: Negative.    Gastrointestinal:  Positive for diarrhea, nausea and vomiting. Negative for abdominal distention, abdominal pain, anal bleeding, blood in stool, constipation and rectal pain.        Diarrhea x6, 2 episodes of vomiting since 3:00 a.m..    Genitourinary: Negative.    Musculoskeletal: Negative.    Skin: Negative.    Neurological: Negative.    All other systems reviewed and are negative.      Physical Exam     Initial Vitals   BP Pulse Resp Temp SpO2   03/09/25 1331 03/09/25 1233 03/09/25 1233 03/09/25 1233 03/09/25 1233   138/89 (!) 112 18 98.4 °F (36.9 °C) 99 %      MAP       --                Physical Exam    Nursing note and vitals reviewed.  Constitutional: Vital signs are normal. She appears well-developed and well-nourished. She is not  diaphoretic. She is Obese . She is cooperative.  Non-toxic appearance. She does not have a sickly appearance. She does not appear ill. No distress.   Nontoxic appearance   HENT:   Head: Normocephalic and atraumatic.   Right Ear: Hearing normal.   Left Ear: Hearing normal.   Nose: Nose normal. Mouth/Throat: Uvula is midline, oropharynx is clear and moist and mucous membranes are normal.   Eyes: Conjunctivae and EOM are normal. Pupils are equal, round, and reactive to light.   Neck: Trachea normal and phonation normal. Neck supple.   Normal range of motion.  Cardiovascular:  Normal rate, regular rhythm, S1 normal, S2 normal, normal heart sounds, intact distal pulses and normal pulses.           Pulmonary/Chest: Effort normal and breath sounds normal. No accessory muscle usage. No tachypnea and no bradypnea. No respiratory distress. She has no decreased breath sounds. She has no wheezes. She has no rhonchi. She has no rales.   Normal effort, symmetrical chest rise and fall, no accessory muscle use. Bilateral clear breath sounds in all fields anterior and posterior.      Abdominal: Abdomen is soft. Bowel sounds are normal. She exhibits no distension. There is no abdominal tenderness.   Abdomen is soft, nontender, no peritoneal signs. Tolerating PO fluids.    There is no rebound and no guarding.   Musculoskeletal:         General: Normal range of motion.      Cervical back: Normal range of motion and neck supple.     Neurological: She is alert and oriented to person, place, and time. She has normal strength. GCS score is 15. GCS eye subscore is 4. GCS verbal subscore is 5. GCS motor subscore is 6.   Skin: Skin is warm, dry and intact. Capillary refill takes less than 2 seconds. No pallor.   Psychiatric: She has a normal mood and affect. Her behavior is normal. Judgment and thought content normal.         ED Course   Procedures  Labs Reviewed   POCT URINE PREGNANCY       Result Value    POC Preg Test, Ur Negative        Acceptable Yes            Imaging Results    None          Medications   ondansetron disintegrating tablet 4 mg (4 mg Oral Given 3/9/25 1330)   dicyclomine injection 20 mg (20 mg Intramuscular Given 3/9/25 1330)   acetaminophen tablet 650 mg (650 mg Oral Given 3/9/25 1501)   lactated ringers bolus 1,000 mL (0 mLs Intravenous Stopped 3/9/25 1552)   diphenhydrAMINE injection 25 mg (25 mg Intravenous Given 3/9/25 1500)     Medical Decision Making  Appendicitis, Diverticulitis, Pancreatitis, Pyelonephritis, AAA, Dissection, MI, Gastric Ulcer, Peptic Ulcer, Urinary retention, among others    Heart rate elevated with orthostatics  Zofran with p.o. challenge, tolerating   P.o. fluids in the emergency department  IV with 1 L normal saline provided for comfort  Clear liquid diet for 24-48 hours then bland diet advanced as tolerated  Use commercial electrolyte solution for oral hydration    The patient is a 29 y.o. female who presents to the [facility] Emergency Department with a chief complaint of nausea vomiting and diarrhea since 3:00 a.m..  EPIC records were reviewed. Lab work was ordered and   reviewed..  A cardiac monitor and oximeter were placed during the patient's Emergency Department stay.    The patient was advised to rest, increase liquids, use commercial electrolyte solution. It was recommended for the patient to follow up with primary care.  The patient was provided prescriptions for Bentyl and Zofran.  The patient's vital signs and condition remained stable while undergoing evaluation in the Emergency Department. The patient agreed with the plan for care.   The patient is nontoxic appearing, in no acute distress, with stable vital signs and resting comfortably. There is no objective evidence requiring urgent intervention or hospitalization. I provided counseling to the patient with regard to the medical condition, the treatment plan, specific conditions for return, and the importance of follow up.  Detailed written and verbal instructions were provided to the patient and he/she expressed a verbal understanding of the discharge instructions and overall management plan. Reiterated the importance of medication administration and safety, advised the patient to follow up with primary care provider in 3-5 days or sooner if needed. All questions and concerns were addressed before leaving the Emergency Department. The patient is stable for discharge.           Amount and/or Complexity of Data Reviewed  Labs: ordered. Decision-making details documented in ED Course.    Risk  OTC drugs.  Prescription drug management.               ED Course as of 03/09/25 1721   Sun Mar 09, 2025   1424 POCT urine pregnancy [RQ]   1424 hCG Qualitative, Urine: Negative [RQ]   1425 BP(!): 162/102 [RQ]   1444 Increased heart rate with standing, IV fluids ordered. Tolerating PO Fluids in the ED [RQ]      ED Course User Index  [RQ] Jeane Urban FNP                           Clinical Impression:  Final diagnoses:  [A08.4] Viral gastroenteritis (Primary)          ED Disposition Condition    Discharge Stable          ED Prescriptions       Medication Sig Dispense Start Date End Date Auth. Provider    dicyclomine (BENTYL) 20 mg tablet Take 1 tablet (20 mg total) by mouth 2 (two) times daily. 20 tablet 3/9/2025 4/8/2025 Jeane Urban FNP    ondansetron (ZOFRAN) 4 MG tablet Take 1 tablet (4 mg total) by mouth every 6 (six) hours. 12 tablet 3/9/2025 -- Jeane Urban FNP          Follow-up Information       Follow up With Specialties Details Why Contact Cristela Claudio FNP Family Medicine In 1 week  [facility] [facility] [address]  [phone]      [facility] Emergency Dept Emergency Medicine  If symptoms worsen [address] [facility] [address] [phone]               [1]   Social History  Tobacco Use    Smoking status: Former     Types: Cigarettes     Passive exposure: Never     Smokeless tobacco: Never   Substance Use Topics    Alcohol use: Yes     Comment: ocass    Drug use: Not Currently     Frequency: 2.0 times per week     Types: Marijuana        Jeane Urban FNP  03/09/25 0772

## 2025-03-22 ENCOUNTER — HOSPITAL ENCOUNTER (EMERGENCY)
Facility: HOSPITAL | Age: 30
Discharge: HOME | End: 2025-03-22
Attending: EMERGENCY MEDICINE
Payer: MEDICAID

## 2025-03-22 VITALS
SYSTOLIC BLOOD PRESSURE: 130 MMHG | RESPIRATION RATE: 17 BRPM | WEIGHT: 293 LBS | BODY MASS INDEX: 50.02 KG/M2 | HEIGHT: 64 IN | HEART RATE: 88 BPM | TEMPERATURE: 98 F | DIASTOLIC BLOOD PRESSURE: 77 MMHG | OXYGEN SATURATION: 98 %

## 2025-03-22 DIAGNOSIS — G44.209 TENSION HEADACHE: ICD-10-CM

## 2025-03-22 DIAGNOSIS — R51.9 FRONTAL HEADACHE: Primary | ICD-10-CM

## 2025-03-22 LAB
B-HCG UR QL: NEGATIVE
CTP QC/QA: YES

## 2025-03-22 PROCEDURE — 99284 EMERGENCY DEPT VISIT MOD MDM: CPT | Mod: 25

## 2025-03-22 PROCEDURE — 96372 THER/PROPH/DIAG INJ SC/IM: CPT

## 2025-03-22 PROCEDURE — 81025 URINE PREGNANCY TEST: CPT

## 2025-03-22 PROCEDURE — 25000003 PHARM REV CODE 250

## 2025-03-22 PROCEDURE — 63600175 PHARM REV CODE 636 W HCPCS: Mod: JZ,TB

## 2025-03-22 RX ORDER — METHOCARBAMOL 500 MG/1
1000 TABLET, FILM COATED ORAL 3 TIMES DAILY
Qty: 30 TABLET | Refills: 0 | Status: SHIPPED | OUTPATIENT
Start: 2025-03-22 | End: 2025-03-27

## 2025-03-22 RX ORDER — METHOCARBAMOL 500 MG/1
1000 TABLET, FILM COATED ORAL
Status: COMPLETED | OUTPATIENT
Start: 2025-03-22 | End: 2025-03-22

## 2025-03-22 RX ORDER — KETOROLAC TROMETHAMINE 10 MG/1
10 TABLET, FILM COATED ORAL 2 TIMES DAILY PRN
Qty: 10 TABLET | Refills: 0 | Status: SHIPPED | OUTPATIENT
Start: 2025-03-22 | End: 2025-03-27

## 2025-03-22 RX ORDER — KETOROLAC TROMETHAMINE 30 MG/ML
60 INJECTION, SOLUTION INTRAMUSCULAR; INTRAVENOUS
Status: COMPLETED | OUTPATIENT
Start: 2025-03-22 | End: 2025-03-22

## 2025-03-22 RX ADMIN — METHOCARBAMOL 1000 MG: 500 TABLET ORAL at 09:03

## 2025-03-22 RX ADMIN — KETOROLAC TROMETHAMINE 60 MG: 30 INJECTION, SOLUTION INTRAMUSCULAR; INTRAVENOUS at 09:03

## 2025-03-22 NOTE — Clinical Note
"Esthela Bellafany" Varsha was seen and treated in our emergency department on 3/22/2025.  She may return to work on 03/26/2025.       If you have any questions or concerns, please don't hesitate to call.      Maico Morgan RN    "

## 2025-03-23 NOTE — ED PROVIDER NOTES
Encounter Date: 3/22/2025       History     Chief Complaint   Patient presents with    Headache     Started  yesterday.  Took  Tylenol  an hour  ago     29-year-old female with past medical history of hypertension presents to the emergency department with complaints of a headache in her bilateral temporal area that began yesterday.  Her headache has been constant and unchanged.  It is described as a throbbing.  She took over-the-counter Tylenol twice with no relief.  She does not have a history of migraines.  Her last menstrual period is unknown, she reports she has irregular cycles.  She denies photophobia, nausea, vomiting, chest pain, shortness of breath, palpitations, blurry vision, dizziness, edema,.     The history is provided by the patient.     Review of patient's allergies indicates:  No Known Allergies  Past Medical History:   Diagnosis Date    Abnormal Pap smear of cervix     Hypertension     Morbid obesity     SVT (supraventricular tachycardia)      Past Surgical History:   Procedure Laterality Date    NO PAST SURGERIES       Family History   Problem Relation Name Age of Onset    Diabetes Mother      Hypertension Mother      Diabetes Sister      Stroke Sister      Hypertension Sister       Social History[1]  Review of Systems   Constitutional: Negative.    HENT: Negative.     Eyes: Negative.    Respiratory: Negative.     Cardiovascular: Negative.    Gastrointestinal: Negative.    Genitourinary: Negative.    Musculoskeletal: Negative.    Skin: Negative.    Neurological:  Positive for headaches. Negative for dizziness, tremors, seizures, syncope, facial asymmetry, speech difficulty, weakness, light-headedness and numbness.   All other systems reviewed and are negative.      Physical Exam     Initial Vitals [03/22/25 2114]   BP Pulse Resp Temp SpO2   136/76 102 18 98.4 °F (36.9 °C) 100 %      MAP       --         Physical Exam    Nursing note and vitals reviewed.  Constitutional: Vital signs are normal. She  appears well-developed and well-nourished. She is not diaphoretic. She is Obese . She is cooperative.  Non-toxic appearance. She does not have a sickly appearance. She does not appear ill. No distress.   Nontoxic appearance   HENT:   Head: Normocephalic and atraumatic.   Right Ear: Hearing normal.   Left Ear: Hearing normal.   Nose: Nose normal. Mouth/Throat: Uvula is midline and oropharynx is clear and moist.   Eyes: Conjunctivae and EOM are normal. Pupils are equal, round, and reactive to light.   Neck: Trachea normal and phonation normal. Neck supple.   Normal range of motion.  Cardiovascular:  Normal rate, regular rhythm, normal heart sounds, intact distal pulses and normal pulses.           Pulmonary/Chest: Effort normal and breath sounds normal. No accessory muscle usage. No tachypnea and no bradypnea. No respiratory distress. She has no decreased breath sounds. She has no wheezes. She has no rhonchi. She has no rales.   Normal effort, symmetrical chest rise and fall, no accessory muscle use. Bilateral clear breath sounds in all fields anterior and posterior.      Abdominal: Abdomen is soft. Bowel sounds are normal. There is no abdominal tenderness.   Abdomen is soft, nontender, no peritoneal signs. Tolerating PO fluids.      Musculoskeletal:         General: Normal range of motion.      Cervical back: Normal range of motion and neck supple.     Neurological: She is alert and oriented to person, place, and time. She has normal strength. She displays no atrophy and no tremor. No cranial nerve deficit or sensory deficit. She exhibits normal muscle tone. She displays a negative Romberg sign. Coordination and gait normal. GCS score is 15. GCS eye subscore is 4. GCS verbal subscore is 5. GCS motor subscore is 6.   Skin: Skin is warm, dry and intact. Capillary refill takes less than 2 seconds. No pallor.   Psychiatric: She has a normal mood and affect. Thought content normal.         ED Course   Procedures  Labs  Reviewed   POCT URINE PREGNANCY       Result Value    POC Preg Test, Ur Negative       Acceptable Yes            Imaging Results    None          Medications   ketorolac injection 60 mg (60 mg Intramuscular Given 3/22/25 2150)   methocarbamoL tablet 1,000 mg (1,000 mg Oral Given 3/22/25 2151)     Medical Decision Making  Tensional headache, cluster headache, otitis, dental infection, SAH, shingles, encephalitis, meningitis, among others      Initial plan: , POC pregnancy Toradol and Robaxin    Paste POC pregnancy was negative, patient had complete resolution of her symptoms with Robaxin and Toradol.  She is requesting to be discharged with a work excuse.  Her vital signs have remained stable.  ED precautions discussed.  She is in no acute distress.  The patient is a 29 y.o. female who presents to the [facility] Emergency Department with a chief complaint of frontal headache for 1 day.  EPIC records were reviewed. Lab work was ordered and   reviewed. Findings stated above POC pregnancy is negative.  The patient was discharged home with a diagnosis of headache. The patient was advised to rest. It was recommended for the patient to follow up with primary care.  The patient was provided prescriptions for Toradol and Robaxin.  The patient's vital signs and condition remained stable while undergoing evaluation in the Emergency Department. The patient agreed with the plan for care.   The patient is nontoxic appearing, in no acute distress, with stable vital signs and resting comfortably. There is no objective evidence requiring urgent intervention or hospitalization. I provided counseling to the patient with regard to the medical condition, the treatment plan, specific conditions for return, and the importance of follow up. Detailed written and verbal instructions were provided to the patient and he/she expressed a verbal understanding of the discharge instructions and overall management plan. Reiterated the  importance of medication administration and safety, advised the patient to follow up with primary care provider in 3-5 days or sooner if needed. All questions and concerns were addressed before leaving the Emergency Department. The patient is stable for discharge.       Amount and/or Complexity of Data Reviewed  Labs: ordered. Decision-making details documented in ED Course.    Risk  Prescription drug management.               ED Course as of 03/22/25 2259   Sat Mar 22, 2025   2220 POCT urine pregnancy [RQ]   2220 hCG Qualitative, Urine: Negative [RQ]      ED Course User Index  [RQ] Jeane Urban FNP                           Clinical Impression:  Final diagnoses:  [R51.9] Frontal headache (Primary)  [G44.209] Tension headache          ED Disposition Condition    Discharge Stable          ED Prescriptions       Medication Sig Dispense Start Date End Date Auth. Provider    ketorolac (TORADOL) 10 mg tablet Take 1 tablet (10 mg total) by mouth 2 (two) times daily as needed. 10 tablet 3/22/2025 3/27/2025 Jeane Urban FNP    methocarbamoL (ROBAXIN) 500 MG Tab Take 2 tablets (1,000 mg total) by mouth 3 (three) times daily. for 5 days 30 tablet 3/22/2025 3/27/2025 Jeane Urban FNP    methocarbamoL (ROBAXIN) 500 MG Tab Take 2 tablets (1,000 mg total) by mouth 3 (three) times daily. for 5 days 30 tablet 3/22/2025 3/27/2025 Jeane Urban FNP          Follow-up Information       Follow up With Specialties Details Why Contact Info    Cristela Benson FNP Family Medicine In 1 week  [address] [facility]  [address] [facility] [address]  [phone]      [facility] Emergency Dept Emergency Medicine  If symptoms worsen [address] [address] [phone]               [1]   Social History  Tobacco Use    Smoking status: Former     Types: Cigarettes     Passive exposure: Never    Smokeless tobacco: Never   Substance Use Topics    Alcohol use: Yes     Comment: ocass    Drug use:  Not Currently     Frequency: 2.0 times per week     Types: Marijuana        Jeane Urban, GILDARDO  03/22/25 3386

## 2025-04-25 DIAGNOSIS — I10 HYPERTENSION, UNSPECIFIED TYPE: ICD-10-CM

## 2025-04-28 RX ORDER — ASPIRIN 81 MG/1
81 TABLET ORAL DAILY
Qty: 30 TABLET | Refills: 3 | Status: SHIPPED | OUTPATIENT
Start: 2025-04-28

## 2025-05-05 ENCOUNTER — TELEPHONE (OUTPATIENT)
Dept: CARDIOLOGY | Facility: CLINIC | Age: 30
End: 2025-05-05
Payer: MEDICAID

## 2025-05-05 DIAGNOSIS — Z01.810 PREOP CARDIOVASCULAR EXAM: Primary | ICD-10-CM

## 2025-05-05 NOTE — TELEPHONE ENCOUNTER
----- Message from German Vlale MD sent at 5/5/2025  3:25 PM CDT -----  Stress test ordered  ----- Message -----  From: Carmencita Mckinnon LPN  Sent: 5/5/2025   2:55 PM CDT  To: German Valle MD    Pt reprots she does have some sob and is willing to do the treadmill stress test. Please order.  ----- Message -----  From: Carmencita Mckinnon LPN  Sent: 5/5/2025  12:53 PM CDT  To: Usha Morillo; [facility] Clinical Suppo#    Na x 2, lvm for pt to contact clinic to discuss  ----- Message -----  From: German Valle MD  Sent: 5/2/2025   2:11 PM CDT  To: Carmencita Mckinnon LPN    If patient is symptomatic then would recommend work up.  Can try treadmill stress test if patient is agreeable.  ----- Message -----  From: Carmencita Mckinnon LPN  Sent: 5/2/2025  12:29 PM CDT  To: German Valle MD      ----- Message -----  From: Moe Benavides FNP  Sent: 5/2/2025  12:27 PM CDT  To: Carmencita Mckinnon LPN    Based on Ginger's last clinic note,  the patient was experiencing cardiac symptoms of chest pain and shortness of breath.  I recommend that you confer with Dr. Valle to determine if he is willing to provide cardiac risk stratification without the testing.  However if the patient remains symptomatic, he most likely will require the recommend testing to be completed prior to appointment.Thank you !Moe EWING- BC  ----- Message -----  From: Carmencita Mckinnon LPN  Sent: 5/2/2025  12:22 PM CDT  To: GILDARDO Romeo    Can you please advise?  ----- Message -----  From: Usha Morillo  Sent: 5/2/2025  11:52 AM CDT  To: [facility] Clinical Support Staff    Patient is scheduled to return to clinic on 05/13/25 in need of cardiac risk stratification for gyn procedure, but has not completed her echo and stress test.  She stated that her insurance has denied the test.  Please advise if patient is to keep scheduled appt or does she need to be r/s.Thank you,Usha

## 2025-05-13 PROBLEM — N92.6 IRREGULAR MENSTRUAL CYCLE: Status: ACTIVE | Noted: 2025-05-13

## 2025-05-13 PROBLEM — L68.0 HIRSUTISM: Status: ACTIVE | Noted: 2025-05-13

## 2025-05-15 ENCOUNTER — HOSPITAL ENCOUNTER (EMERGENCY)
Facility: HOSPITAL | Age: 30
Discharge: HOME | End: 2025-05-15
Attending: EMERGENCY MEDICINE
Payer: MEDICAID

## 2025-05-15 VITALS
WEIGHT: 293 LBS | OXYGEN SATURATION: 98 % | BODY MASS INDEX: 50.02 KG/M2 | SYSTOLIC BLOOD PRESSURE: 161 MMHG | TEMPERATURE: 98 F | RESPIRATION RATE: 20 BRPM | HEART RATE: 87 BPM | HEIGHT: 64 IN | DIASTOLIC BLOOD PRESSURE: 87 MMHG

## 2025-05-15 DIAGNOSIS — M72.2 PLANTAR FASCIITIS: Primary | ICD-10-CM

## 2025-05-15 DIAGNOSIS — M77.32 HEEL SPUR, LEFT: ICD-10-CM

## 2025-05-15 DIAGNOSIS — M79.673 FOOT PAIN: ICD-10-CM

## 2025-05-15 LAB — B-HCG UR QL: NEGATIVE

## 2025-05-15 PROCEDURE — 99284 EMERGENCY DEPT VISIT MOD MDM: CPT | Mod: 25

## 2025-05-15 PROCEDURE — 81025 URINE PREGNANCY TEST: CPT | Performed by: NURSE PRACTITIONER

## 2025-05-15 PROCEDURE — 63600175 PHARM REV CODE 636 W HCPCS: Mod: JZ,TB | Performed by: PHYSICIAN ASSISTANT

## 2025-05-15 PROCEDURE — 96372 THER/PROPH/DIAG INJ SC/IM: CPT | Performed by: PHYSICIAN ASSISTANT

## 2025-05-15 RX ORDER — HYDROCODONE BITARTRATE AND ACETAMINOPHEN 5; 325 MG/1; MG/1
1 TABLET ORAL EVERY 12 HOURS PRN
Qty: 6 TABLET | Refills: 0 | Status: SHIPPED | OUTPATIENT
Start: 2025-05-15

## 2025-05-15 RX ORDER — DICLOFENAC SODIUM 50 MG/1
50 TABLET, DELAYED RELEASE ORAL 3 TIMES DAILY
Qty: 21 TABLET | Refills: 0 | Status: SHIPPED | OUTPATIENT
Start: 2025-05-15 | End: 2025-05-22

## 2025-05-15 RX ORDER — KETOROLAC TROMETHAMINE 30 MG/ML
60 INJECTION, SOLUTION INTRAMUSCULAR; INTRAVENOUS
Status: COMPLETED | OUTPATIENT
Start: 2025-05-15 | End: 2025-05-15

## 2025-05-15 RX ADMIN — KETOROLAC TROMETHAMINE 60 MG: 60 INJECTION, SOLUTION INTRAMUSCULAR at 08:05

## 2025-05-15 NOTE — FIRST PROVIDER EVALUATION
Medical screening examination initiated.  I have conducted a focused provider triage encounter, findings are as follows:    Brief history of present illness:  Patient states bilateral foot pain x 2 weeks. Denies any injury or trauma.     There were no vitals filed for this visit.    Pertinent physical exam:  Awake, alert, ambulatory      Brief workup plan:  Imaging    Preliminary workup initiated; this workup will be continued and followed by the physician or advanced practice provider that is assigned to the patient when roomed.

## 2025-05-16 NOTE — DISCHARGE INSTRUCTIONS
Use ice and elevation, 20 minutes on and 20 minutes off.  Apply Ace wraps for support.  Use diclofenac for pain and swelling.  May take Norco for severe pain.  Do not drink or drive while taking narcotics.  Follow up with PCP/Podiatry for further evaluation as needed.

## 2025-05-16 NOTE — ED PROVIDER NOTES
Encounter Date: 5/15/2025       History     Chief Complaint   Patient presents with    Foot Pain     Pt ambulatory to triage c/o bilateral foot pain on the outside of her feet and swelling. Denies PMH.      29-year-old female presents to ED for evaluation of bilateral foot pain and swelling.  States she has pain and swelling to the out sides of her feet.  Reports pain worse when standing on her feet.  States she is having difficulty walking due to pain.  No medications taken.  Denies any trauma or injury    The history is provided by the patient. No  was used.     Review of patient's allergies indicates:  No Known Allergies  Past Medical History:   Diagnosis Date    Abnormal Pap smear of cervix     Hypertension     Morbid obesity     SVT (supraventricular tachycardia)      Past Surgical History:   Procedure Laterality Date    NO PAST SURGERIES       Family History   Problem Relation Name Age of Onset    Diabetes Mother      Hypertension Mother      Diabetes Sister      Stroke Sister      Hypertension Sister       Social History[1]  Review of Systems   Constitutional:  Negative for chills, fatigue and fever.   Respiratory:  Negative for shortness of breath.    Cardiovascular:  Negative for chest pain.   Gastrointestinal:  Negative for abdominal pain, diarrhea, nausea and vomiting.   Genitourinary:  Negative for dysuria, flank pain, frequency and urgency.   Musculoskeletal:  Positive for arthralgias, joint swelling and myalgias. Negative for back pain.   All other systems reviewed and are negative.      Physical Exam     Initial Vitals [05/15/25 1747]   BP Pulse Resp Temp SpO2   120/81 93 20 98.2 °F (36.8 °C) 98 %      MAP       --         Physical Exam    Nursing note and vitals reviewed.  Constitutional: She appears well-developed and well-nourished.   Morbidly obese   HENT:   Head: Normocephalic and atraumatic.   Right Ear: Tympanic membrane and external ear normal.   Left Ear: Tympanic membrane  and external ear normal. Mouth/Throat: Uvula is midline, oropharynx is clear and moist and mucous membranes are normal. No trismus in the jaw. No uvula swelling. No oropharyngeal exudate, posterior oropharyngeal edema or posterior oropharyngeal erythema.   Eyes: Conjunctivae are normal. Pupils are equal, round, and reactive to light.   Neck: Neck supple.   Normal range of motion.  Cardiovascular:  Normal rate, regular rhythm and normal heart sounds.           Pulmonary/Chest: Breath sounds normal. She has no wheezes. She has no rhonchi. She has no rales.   Abdominal: Abdomen is soft. Bowel sounds are normal. There is no abdominal tenderness.   Musculoskeletal:         General: Normal range of motion.      Cervical back: Normal range of motion and neck supple.      Comments: Tenderness noted to bilateral feet.  No erythema or fluctuance.  Patient has good DP pulses.  No pitting edema.     Neurological: She is alert and oriented to person, place, and time.   Skin: Skin is warm and dry.   Psychiatric: She has a normal mood and affect.         ED Course   Procedures  Labs Reviewed   HCG QUALITATIVE URINE - Normal       Result Value    hCG Qualitative, Urine Negative            Imaging Results              X-Ray Foot Complete Right (Final result)  Result time 05/15/25 18:22:11      Final result by Quirino Lees MD (05/15/25 18:22:11)                   Impression:      No acute findings.      Electronically signed by: Quirino Lees  Date:    05/15/2025  Time:    18:22               Narrative:    EXAMINATION:  XR FOOT COMPLETE 3 VIEW RIGHT    CLINICAL HISTORY:  Pain in unspecified foot    COMPARISON:  None    FINDINGS:  Three views of the right foot.  There is no fracture or dislocation.                                       X-Ray Foot Complete Left (Final result)  Result time 05/15/25 18:20:02      Final result by Giovanny Alvarado MD (05/15/25 18:20:02)                   Impression:      Soft tissue swelling in  the dorsum of the foot and the plantar surface of the foot distally    Inferior calcaneal spur      Electronically signed by: Abdoul Alvarado  Date:    05/15/2025  Time:    18:20               Narrative:    EXAMINATION:  XR FOOT COMPLETE 3 VIEW LEFT    CLINICAL HISTORY:  .  Pain in unspecified foot    TECHNIQUE:  AP, lateral and oblique views of the left foot were performed.    COMPARISON:  None    FINDINGS:  The bones and joints are in good anatomic alignment.  No fracture is seen.  No dislocation is seen.  There is soft tissue swelling seen in the dorsum of the foot and in the distal plantar surface of the foot.    There is an inferior calcaneal spur.                                       Medications   ketorolac injection 60 mg (60 mg Intramuscular Given 5/15/25 2057)     Medical Decision Making  29-year-old female presents to ED for evaluation of bilateral foot pain and swelling.  States she has pain and swelling to the out sides of her feet.  Reports pain worse when standing on her feet.  States she is having difficulty walking due to pain.  No medications taken.  Denies any trauma or injury    Differential diagnosis includes but isn't limited to Achilles tendinitis, plantar fasciitis, foot pain, bone spur    Amount and/or Complexity of Data Reviewed  Radiology: ordered.  Discussion of management or test interpretation with external provider(s): Patient GCS 15 and neuro intact.  Ambulatory with steady gait presents to ED for evaluation of bilateral foot pain.  States has been ongoing over the last 2 weeks without any trauma or injury.  X-rays obtained without any acute findings.  Calcaneal spur noted on left foot.  Patient's pain also consistent with plantar fasciitis.  Will give Toradol shot here.  Will give short course of pain control.  Ace wrap applied  Discussed follow up with her PCP.  Patient verbalizes understanding.    Risk  OTC drugs.  Prescription drug management.                                       Clinical Impression:  Final diagnoses:  [M79.673] Foot pain  [M72.2] Plantar fasciitis (Primary)  [M77.32] Heel spur, left          ED Disposition Condition    Discharge Stable          ED Prescriptions       Medication Sig Dispense Start Date End Date Auth. Provider    HYDROcodone-acetaminophen (NORCO) 5-325 mg per tablet Take 1 tablet by mouth every 12 (twelve) hours as needed for Pain. 6 tablet 5/15/2025 -- Olinda Colmenares PA    diclofenac (VOLTAREN) 50 MG EC tablet Take 1 tablet (50 mg total) by mouth 3 (three) times daily. for 7 days 21 tablet 5/15/2025 5/22/2025 Olinda Colmenares PA          Follow-up Information       Follow up With Specialties Details Why Contact Info    Cristela Benson, FNP Family Medicine Call   [address] [facility]  [address] [facility] [address]  [phone]      Quirino Snowden Jr., DPM Podiatry Call   [address] [facility] [address] [facility] [address]  [facility] [id number] [phone]                 [1]   Social History  Tobacco Use    Smoking status: Former     Types: Cigarettes     Passive exposure: Never    Smokeless tobacco: Never   Substance Use Topics    Alcohol use: Yes     Comment: ocass    Drug use: Not Currently     Frequency: 2.0 times per week     Types: Marijuana        Olinda Colmenares PA  05/15/25 2100

## 2025-05-28 ENCOUNTER — TELEPHONE (OUTPATIENT)
Dept: FAMILY MEDICINE | Facility: CLINIC | Age: 30
End: 2025-05-28
Payer: MEDICAID

## 2025-05-28 DIAGNOSIS — Z00.00 WELLNESS EXAMINATION: Primary | ICD-10-CM

## 2025-06-03 ENCOUNTER — TELEPHONE (OUTPATIENT)
Dept: OBSTETRICS AND GYNECOLOGY | Facility: HOSPITAL | Age: 30
End: 2025-06-03
Payer: MEDICAID

## 2025-06-03 ENCOUNTER — LAB VISIT (OUTPATIENT)
Dept: LAB | Facility: HOSPITAL | Age: 30
End: 2025-06-03
Attending: STUDENT IN AN ORGANIZED HEALTH CARE EDUCATION/TRAINING PROGRAM
Payer: MEDICAID

## 2025-06-03 DIAGNOSIS — N92.5 RETROGRADE MENSTRUATION: Primary | ICD-10-CM

## 2025-06-03 LAB
25(OH)D3+25(OH)D2 SERPL-MCNC: 8 NG/ML (ref 30–80)
FSH SERPL-ACNC: 4.35 MIU/ML
GLUCOSE 1.5H P 100 G GLC PO SERPL-MCNC: 144 MG/DL (ref 74–100)
GLUCOSE 1H P 100 G GLC PO SERPL-MCNC: 188 MG/DL (ref 100–180)
GLUCOSE 2H P 100 G GLC PO SERPL-MCNC: 151 MG/DL (ref 70–140)
GLUCOSE 30M P 100 G GLC PO SERPL-MCNC: 167 MG/DL (ref 74–100)
GLUCOSE P FAST SERPL-MCNC: 108 MG/DL (ref 70–100)
INSULIN 120 (OHS): 87.5 UU/ML (ref 16–166)
INSULIN 30 (OHS): 98.5 UU/ML (ref 30–230)
INSULIN 60 (OHS): 109 UU/ML (ref 18–276)
INSULIN 90 (OHS): 79.9 UU/ML
INSULIN FASTING (OHS): 32.8 UU/ML
LH SERPL-ACNC: 2.69 MIU/ML
POCT GLUCOSE: 116 MG/DL (ref 70–110)
PROLACTIN LEVEL (OLG): 9.03 NG/ML (ref 5.18–26.53)
TSH SERPL-ACNC: 0.87 UIU/ML (ref 0.35–4.94)

## 2025-06-03 PROCEDURE — 82951 GLUCOSE TOLERANCE TEST (GTT): CPT

## 2025-06-03 PROCEDURE — 84443 ASSAY THYROID STIM HORMONE: CPT

## 2025-06-03 PROCEDURE — 82950 GLUCOSE TEST: CPT

## 2025-06-03 PROCEDURE — 83525 ASSAY OF INSULIN: CPT | Mod: 59

## 2025-06-03 PROCEDURE — 83525 ASSAY OF INSULIN: CPT

## 2025-06-03 PROCEDURE — 82947 ASSAY GLUCOSE BLOOD QUANT: CPT

## 2025-06-03 PROCEDURE — 84146 ASSAY OF PROLACTIN: CPT

## 2025-06-03 PROCEDURE — 83001 ASSAY OF GONADOTROPIN (FSH): CPT

## 2025-06-03 PROCEDURE — 82306 VITAMIN D 25 HYDROXY: CPT

## 2025-06-03 PROCEDURE — 36415 COLL VENOUS BLD VENIPUNCTURE: CPT

## 2025-06-03 PROCEDURE — 83002 ASSAY OF GONADOTROPIN (LH): CPT

## 2025-06-03 PROCEDURE — 84402 ASSAY OF FREE TESTOSTERONE: CPT

## 2025-06-03 RX ORDER — METFORMIN HYDROCHLORIDE 500 MG/1
TABLET, EXTENDED RELEASE ORAL
Qty: 105 TABLET | Refills: 0 | Status: SHIPPED | OUTPATIENT
Start: 2025-06-03 | End: 2025-07-03

## 2025-06-03 RX ORDER — ASPIRIN 325 MG
50000 TABLET, DELAYED RELEASE (ENTERIC COATED) ORAL
Qty: 12 CAPSULE | Refills: 0 | Status: SHIPPED | OUTPATIENT
Start: 2025-06-03 | End: 2025-08-20

## 2025-06-09 ENCOUNTER — HOSPITAL ENCOUNTER (EMERGENCY)
Facility: HOSPITAL | Age: 30
Discharge: HOME | End: 2025-06-09
Attending: EMERGENCY MEDICINE
Payer: MEDICAID

## 2025-06-09 VITALS
WEIGHT: 293 LBS | RESPIRATION RATE: 18 BRPM | HEART RATE: 99 BPM | HEIGHT: 64 IN | OXYGEN SATURATION: 99 % | DIASTOLIC BLOOD PRESSURE: 72 MMHG | TEMPERATURE: 98 F | SYSTOLIC BLOOD PRESSURE: 140 MMHG | BODY MASS INDEX: 50.02 KG/M2

## 2025-06-09 DIAGNOSIS — N61.1 ABSCESS OF RIGHT BREAST: Primary | ICD-10-CM

## 2025-06-09 LAB
B-HCG UR QL: NEGATIVE
CTP QC/QA: YES

## 2025-06-09 PROCEDURE — 81025 URINE PREGNANCY TEST: CPT | Performed by: EMERGENCY MEDICINE

## 2025-06-09 PROCEDURE — 63600175 PHARM REV CODE 636 W HCPCS: Performed by: EMERGENCY MEDICINE

## 2025-06-09 PROCEDURE — 99284 EMERGENCY DEPT VISIT MOD MDM: CPT | Mod: 25

## 2025-06-09 PROCEDURE — 10061 I&D ABSCESS COMP/MULTIPLE: CPT

## 2025-06-09 PROCEDURE — 25000003 PHARM REV CODE 250: Performed by: EMERGENCY MEDICINE

## 2025-06-09 RX ORDER — MUPIROCIN 20 MG/G
OINTMENT TOPICAL 3 TIMES DAILY
Qty: 30 G | Refills: 1 | Status: SHIPPED | OUTPATIENT
Start: 2025-06-09 | End: 2025-06-16

## 2025-06-09 RX ORDER — HYDROCODONE BITARTRATE AND ACETAMINOPHEN 5; 325 MG/1; MG/1
2 TABLET ORAL
Refills: 0 | Status: COMPLETED | OUTPATIENT
Start: 2025-06-09 | End: 2025-06-09

## 2025-06-09 RX ORDER — LIDOCAINE HYDROCHLORIDE 10 MG/ML
5 INJECTION, SOLUTION EPIDURAL; INFILTRATION; INTRACAUDAL; PERINEURAL
Status: COMPLETED | OUTPATIENT
Start: 2025-06-09 | End: 2025-06-09

## 2025-06-09 RX ORDER — MUPIROCIN 20 MG/G
1 OINTMENT TOPICAL
Status: COMPLETED | OUTPATIENT
Start: 2025-06-09 | End: 2025-06-09

## 2025-06-09 RX ORDER — SULFAMETHOXAZOLE AND TRIMETHOPRIM 800; 160 MG/1; MG/1
2 TABLET ORAL
Status: COMPLETED | OUTPATIENT
Start: 2025-06-09 | End: 2025-06-09

## 2025-06-09 RX ORDER — SULFAMETHOXAZOLE AND TRIMETHOPRIM 800; 160 MG/1; MG/1
2 TABLET ORAL 2 TIMES DAILY
Qty: 28 TABLET | Refills: 0 | Status: SHIPPED | OUTPATIENT
Start: 2025-06-09 | End: 2025-06-16

## 2025-06-09 RX ORDER — HYDROCODONE BITARTRATE AND ACETAMINOPHEN 5; 325 MG/1; MG/1
1 TABLET ORAL EVERY 6 HOURS PRN
Qty: 12 TABLET | Refills: 0 | Status: SHIPPED | OUTPATIENT
Start: 2025-06-09

## 2025-06-09 RX ADMIN — LIDOCAINE HYDROCHLORIDE 50 MG: 10 INJECTION, SOLUTION EPIDURAL; INFILTRATION; INTRACAUDAL; PERINEURAL at 02:06

## 2025-06-09 RX ADMIN — HYDROCODONE BITARTRATE AND ACETAMINOPHEN 2 TABLET: 5; 325 TABLET ORAL at 02:06

## 2025-06-09 RX ADMIN — MUPIROCIN 1 TUBE: 20 OINTMENT TOPICAL at 02:06

## 2025-06-09 RX ADMIN — SULFAMETHOXAZOLE AND TRIMETHOPRIM 2 TABLET: 800; 160 TABLET ORAL at 02:06

## 2025-06-09 NOTE — ED PROVIDER NOTES
"Encounter Date: 6/9/2025       History     Chief Complaint   Patient presents with    Abscess     States thinks "bit by something".  Starting 2 days ago noted makr to right medial breast.  Presents tonight with edema and redness to same.       Raised, pointing abscess, medial aspect right breast, she thinks it may have started with an insect bite.  Present for 2 or 3 days, getting a little worse, no drainage.  No fever.  No other complaints.  Denies chance of pregnancy.    The history is provided by the patient. No  was used.     Review of patient's allergies indicates:  No Known Allergies  Past Medical History:   Diagnosis Date    Abnormal Pap smear of cervix     Hypertension     Morbid obesity     SVT (supraventricular tachycardia)      Past Surgical History:   Procedure Laterality Date    NO PAST SURGERIES       Family History   Problem Relation Name Age of Onset    Diabetes Mother      Hypertension Mother      Diabetes Sister      Stroke Sister      Hypertension Sister       Social History[1]  Review of Systems   Skin:         See HPI       Physical Exam     Initial Vitals [06/09/25 0040]   BP Pulse Resp Temp SpO2   (!) 140/72 99 19 98 °F (36.7 °C) 99 %      MAP       --         Physical Exam    Nursing note and vitals reviewed.  Constitutional: She appears well-developed and well-nourished. No distress.   HENT:   Head: Normocephalic and atraumatic.   Cardiovascular:  Normal rate and regular rhythm.           Pulmonary/Chest: Breath sounds normal. No respiratory distress.     Neurological: She is alert and oriented to person, place, and time. She has normal strength.   Skin:   Raised, pointing 2 cm abscess, medial lower aspect of right breast without cellulitis.         ED Course   I & D - Incision and Drainage    Date/Time: 6/9/2025 2:40 AM  Location procedure was performed: [facility] EMERGENCY DEPARTMENT    Performed by: Quirino Velázquez MD  Authorized by: Quirino Velázquez MD  Pre-operative " "diagnosis: Right breast abscess  Post-operative diagnosis: same  Consent Done: Yes  Consent: Verbal consent obtained  Risks and benefits: risks, benefits and alternatives were discussed  Consent given by: patient  Patient understanding: patient states understanding of the procedure being performed  Patient identity confirmed: verbally with patient  Time out: Immediately prior to procedure a "time out" was called to verify the correct patient, procedure, equipment, support staff and site/side marked as required.  Type: abscess  Body area: trunk  Location details: right breast  Anesthesia: local infiltration    Anesthesia:  Local Anesthetic: lidocaine 1% without epinephrine  Anesthetic total: 6 mL    Patient sedated: no  Description of findings: Copious pus easily drained   Scalpel size: 11  Incision type: single straight  Incision depth: subcutaneous  Complexity: complex  Drainage: pus  Drainage amount: copious  Wound treatment: wound packed  Packing material: 1/4 in iodoform gauze  Complications: No  Estimated blood loss (mL): 3  Patient tolerance: Patient tolerated the procedure well with no immediate complications    Incision depth: subcutaneous        Labs Reviewed   POCT URINE PREGNANCY       Result Value    POC Preg Test, Ur Negative       Acceptable Yes            Imaging Results    None          Medications   sulfamethoxazole-trimethoprim 800-160mg per tablet 2 tablet (2 tablets Oral Given 6/9/25 0222)   mupirocin 2 % ointment 1 Tube (1 Tube Topical (Top) Given 6/9/25 0222)   LIDOcaine (PF) 10 mg/ml (1%) injection 50 mg (50 mg Infiltration Given 6/9/25 0222)   HYDROcodone-acetaminophen 5-325 mg per tablet 2 tablet (2 tablets Oral Given 6/9/25 0222)       2:40 AM D/C instructions:        Packing will need to be removed and replaced in 2 days.      I have referred you to our General surgery Clinic, they will call you to schedule the appointment.    If for whatever reason that does not happen, " return here Wednesday morning for recheck.      Take the antibiotics as prescribed.      Return sooner if worse.      Medical Decision Making  Minor cutaneous abscess of the medial portion of the right breast without complication, appropriate for simple I and D and close outpatient follow-up.    Problems Addressed:  Abscess of right breast: acute illness or injury    Amount and/or Complexity of Data Reviewed  Labs: ordered.    Risk  Prescription drug management.      Additional MDM:   Differential Diagnosis:   Breast abscess, cellulitis, other form of breast lesion among others                                    Clinical Impression:  Final diagnoses:  [N61.1] Abscess of right breast (Primary)          ED Disposition Condition    Discharge Stable          ED Prescriptions       Medication Sig Dispense Start Date End Date Auth. Provider    sulfamethoxazole-trimethoprim 800-160mg (BACTRIM DS) 800-160 mg Tab Take 2 tablets by mouth 2 (two) times daily. for 7 days 28 tablet 6/9/2025 6/16/2025 Quirino Velázquez MD    mupirocin (BACTROBAN) 2 % ointment Apply topically 3 (three) times daily. for 7 days 30 g 6/9/2025 6/16/2025 Quirino Velázquez MD    HYDROcodone-acetaminophen (NORCO) 5-325 mg per tablet Take 1 tablet by mouth every 6 (six) hours as needed for Pain. 12 tablet 6/9/2025 -- Quirino Velázquez MD          Follow-up Information       Follow up With Specialties Details Why Contact Info    [facility] Emergency Medicine  As needed [address] [facility] [address] [phone]    Cristela Benson, P Family Medicine  As needed [address] [facility] [address] [phone]                     [1]   Social History  Tobacco Use    Smoking status: Former     Types: Cigarettes     Passive exposure: Never    Smokeless tobacco: Never   Vaping Use    Vaping status: Never Used   Substance Use Topics    Alcohol use: Yes     Comment: ocass    Drug use: Yes      Frequency: 2.0 times per week     Types: Marijuana        Quirino Velázquez MD  06/09/25 0245

## 2025-06-09 NOTE — DISCHARGE INSTRUCTIONS
Packing will need to be removed and replaced in 2 days.      I have referred you to our General surgery Clinic, they will call you to schedule the appointment.    If for whatever reason that does not happen, return here Wednesday morning for recheck.      Take the antibiotics as prescribed.      Return sooner if worse.

## 2025-06-12 PROCEDURE — 87801 DETECT AGNT MULT DNA AMPLI: CPT | Performed by: STUDENT IN AN ORGANIZED HEALTH CARE EDUCATION/TRAINING PROGRAM

## 2025-06-12 PROCEDURE — 87661 TRICHOMONAS VAGINALIS AMPLIF: CPT

## 2025-06-12 PROCEDURE — 87798 DETECT AGENT NOS DNA AMP: CPT

## 2025-06-12 PROCEDURE — 87661 TRICHOMONAS VAGINALIS AMPLIF: CPT | Mod: MUE | Performed by: STUDENT IN AN ORGANIZED HEALTH CARE EDUCATION/TRAINING PROGRAM

## 2025-06-12 PROCEDURE — 87491 CHLMYD TRACH DNA AMP PROBE: CPT | Performed by: STUDENT IN AN ORGANIZED HEALTH CARE EDUCATION/TRAINING PROGRAM

## 2025-06-17 ENCOUNTER — RESULTS FOLLOW-UP (OUTPATIENT)
Dept: OBSTETRICS AND GYNECOLOGY | Facility: HOSPITAL | Age: 30
End: 2025-06-17

## 2025-06-18 ENCOUNTER — TELEPHONE (OUTPATIENT)
Dept: GYNECOLOGY | Facility: CLINIC | Age: 30
End: 2025-06-18
Payer: MEDICAID

## 2025-06-18 NOTE — TELEPHONE ENCOUNTER
MD phone call to discuss surgical planning. Called x2, unable to get in touch or leave voicemail. Will send message via portal.  Marjorie De Leon MD PGY3  Obstetrics & Gynecology

## 2025-07-08 ENCOUNTER — TELEPHONE (OUTPATIENT)
Dept: GYNECOLOGY | Facility: CLINIC | Age: 30
End: 2025-07-08
Payer: MEDICAID

## 2025-08-31 DIAGNOSIS — I10 HYPERTENSION, UNSPECIFIED TYPE: ICD-10-CM

## 2025-09-02 RX ORDER — ASPIRIN 81 MG/1
81 TABLET ORAL DAILY
Qty: 30 TABLET | Refills: 3 | Status: SHIPPED | OUTPATIENT
Start: 2025-09-02